# Patient Record
Sex: FEMALE | Race: ASIAN | NOT HISPANIC OR LATINO | Employment: OTHER | URBAN - METROPOLITAN AREA
[De-identification: names, ages, dates, MRNs, and addresses within clinical notes are randomized per-mention and may not be internally consistent; named-entity substitution may affect disease eponyms.]

---

## 2017-02-14 ENCOUNTER — COMMUNICATION - HEALTHEAST (OUTPATIENT)
Dept: FAMILY MEDICINE | Facility: CLINIC | Age: 62
End: 2017-02-14

## 2017-02-14 DIAGNOSIS — E03.9 UNSPECIFIED HYPOTHYROIDISM: ICD-10-CM

## 2017-02-14 DIAGNOSIS — I10 ESSENTIAL HYPERTENSION: ICD-10-CM

## 2017-02-16 RX ORDER — FERROUS SULFATE 325(65) MG
TABLET ORAL
Qty: 30 TABLET | Refills: 3 | Status: SHIPPED | OUTPATIENT
Start: 2017-02-16 | End: 2023-06-16

## 2017-02-16 RX ORDER — MOMETASONE FUROATE AND FORMOTEROL FUMARATE DIHYDRATE 200; 5 UG/1; UG/1
AEROSOL RESPIRATORY (INHALATION)
Qty: 13 INHALER | Refills: 5 | Status: SHIPPED | OUTPATIENT
Start: 2017-02-16 | End: 2023-06-16

## 2017-02-16 RX ORDER — LEVOTHYROXINE SODIUM 75 UG/1
TABLET ORAL
Qty: 90 TABLET | Refills: 2 | Status: SHIPPED | OUTPATIENT
Start: 2017-02-16 | End: 2023-06-16

## 2017-02-16 RX ORDER — LISINOPRIL AND HYDROCHLOROTHIAZIDE 20; 25 MG/1; MG/1
TABLET ORAL
Qty: 90 TABLET | Refills: 2 | Status: SHIPPED | OUTPATIENT
Start: 2017-02-16 | End: 2023-06-16

## 2017-12-31 ENCOUNTER — HEALTH MAINTENANCE LETTER (OUTPATIENT)
Age: 62
End: 2017-12-31

## 2022-09-22 ENCOUNTER — IMMUNIZATION (OUTPATIENT)
Dept: FAMILY MEDICINE | Facility: CLINIC | Age: 67
End: 2022-09-22
Payer: MEDICARE

## 2022-09-22 PROCEDURE — 91313 COVID-19,PF,MODERNA BIVALENT: CPT

## 2022-09-22 PROCEDURE — 90662 IIV NO PRSV INCREASED AG IM: CPT

## 2022-09-22 PROCEDURE — 0134A COVID-19,PF,MODERNA BIVALENT: CPT

## 2022-09-22 PROCEDURE — G0008 ADMIN INFLUENZA VIRUS VAC: HCPCS

## 2023-06-16 ENCOUNTER — OFFICE VISIT (OUTPATIENT)
Dept: FAMILY MEDICINE | Facility: CLINIC | Age: 68
End: 2023-06-16
Payer: MEDICARE

## 2023-06-16 VITALS
WEIGHT: 179 LBS | TEMPERATURE: 97.8 F | HEIGHT: 58 IN | HEART RATE: 66 BPM | RESPIRATION RATE: 16 BRPM | OXYGEN SATURATION: 97 % | BODY MASS INDEX: 37.57 KG/M2 | SYSTOLIC BLOOD PRESSURE: 124 MMHG | DIASTOLIC BLOOD PRESSURE: 88 MMHG

## 2023-06-16 DIAGNOSIS — E78.2 MIXED HYPERLIPIDEMIA: ICD-10-CM

## 2023-06-16 DIAGNOSIS — N18.31 STAGE 3A CHRONIC KIDNEY DISEASE (H): ICD-10-CM

## 2023-06-16 DIAGNOSIS — E03.9 ACQUIRED HYPOTHYROIDISM: ICD-10-CM

## 2023-06-16 DIAGNOSIS — I10 ESSENTIAL HYPERTENSION: ICD-10-CM

## 2023-06-16 DIAGNOSIS — Z23 ENCOUNTER FOR VACCINATION: ICD-10-CM

## 2023-06-16 DIAGNOSIS — J30.2 SEASONAL ALLERGIES: ICD-10-CM

## 2023-06-16 DIAGNOSIS — Z00.00 ENCOUNTER FOR MEDICAL EXAMINATION TO ESTABLISH CARE: Primary | ICD-10-CM

## 2023-06-16 DIAGNOSIS — M25.551 HIP PAIN, RIGHT: ICD-10-CM

## 2023-06-16 LAB — HGB BLD-MCNC: 13.5 G/DL (ref 11.7–15.7)

## 2023-06-16 PROCEDURE — 80061 LIPID PANEL: CPT

## 2023-06-16 PROCEDURE — 85018 HEMOGLOBIN: CPT

## 2023-06-16 PROCEDURE — G0009 ADMIN PNEUMOCOCCAL VACCINE: HCPCS

## 2023-06-16 PROCEDURE — 84443 ASSAY THYROID STIM HORMONE: CPT

## 2023-06-16 PROCEDURE — 84439 ASSAY OF FREE THYROXINE: CPT

## 2023-06-16 PROCEDURE — 36415 COLL VENOUS BLD VENIPUNCTURE: CPT

## 2023-06-16 PROCEDURE — 80048 BASIC METABOLIC PNL TOTAL CA: CPT

## 2023-06-16 PROCEDURE — 90677 PCV20 VACCINE IM: CPT

## 2023-06-16 PROCEDURE — 99204 OFFICE O/P NEW MOD 45 MIN: CPT | Mod: 25

## 2023-06-16 RX ORDER — MOMETASONE FUROATE AND FORMOTEROL FUMARATE DIHYDRATE 200; 5 UG/1; UG/1
2 AEROSOL RESPIRATORY (INHALATION) 2 TIMES DAILY
Qty: 13 G | Refills: 3 | Status: SHIPPED | OUTPATIENT
Start: 2023-06-16 | End: 2023-09-15

## 2023-06-16 RX ORDER — MONTELUKAST SODIUM 10 MG/1
10 TABLET ORAL DAILY
Qty: 90 TABLET | Refills: 3 | Status: SHIPPED | OUTPATIENT
Start: 2023-06-16 | End: 2024-06-04

## 2023-06-16 RX ORDER — LISINOPRIL AND HYDROCHLOROTHIAZIDE 20; 25 MG/1; MG/1
1 TABLET ORAL DAILY
Qty: 90 TABLET | Refills: 3 | Status: SHIPPED | OUTPATIENT
Start: 2023-06-16 | End: 2023-09-25

## 2023-06-16 RX ORDER — SENNOSIDES 8.6 MG
650 CAPSULE ORAL EVERY 8 HOURS PRN
Qty: 180 TABLET | Refills: 3 | Status: SHIPPED | OUTPATIENT
Start: 2023-06-16

## 2023-06-16 RX ORDER — LEVOTHYROXINE SODIUM 75 UG/1
75 TABLET ORAL DAILY
Qty: 90 TABLET | Refills: 3 | Status: SHIPPED | OUTPATIENT
Start: 2023-06-16 | End: 2023-06-22 | Stop reason: DRUGHIGH

## 2023-06-16 NOTE — PROGRESS NOTES
Preceptor Attestation:   Patient seen, evaluated and discussed with the resident Dr. Riddle. I have verified the content of the note, which accurately reflects my assessment of the patient and the plan of care.  Supervising Physician:Benjamin Rosenstein, MD, MA  [facility]

## 2023-06-17 LAB
ANION GAP SERPL CALCULATED.3IONS-SCNC: 16 MMOL/L (ref 7–15)
BUN SERPL-MCNC: 25 MG/DL (ref 8–23)
CALCIUM SERPL-MCNC: 9.8 MG/DL (ref 8.8–10.2)
CHLORIDE SERPL-SCNC: 104 MMOL/L (ref 98–107)
CHOLEST SERPL-MCNC: 197 MG/DL
CREAT SERPL-MCNC: 1.07 MG/DL (ref 0.51–0.95)
DEPRECATED HCO3 PLAS-SCNC: 21 MMOL/L (ref 22–29)
GFR SERPL CREATININE-BSD FRML MDRD: 57 ML/MIN/1.73M2
GLUCOSE SERPL-MCNC: 87 MG/DL (ref 70–99)
HDLC SERPL-MCNC: 59 MG/DL
LDLC SERPL CALC-MCNC: 93 MG/DL
NONHDLC SERPL-MCNC: 138 MG/DL
POTASSIUM SERPL-SCNC: 4.1 MMOL/L (ref 3.4–5.3)
SODIUM SERPL-SCNC: 141 MMOL/L (ref 136–145)
T4 FREE SERPL-MCNC: 1.25 NG/DL (ref 0.9–1.7)
TRIGL SERPL-MCNC: 225 MG/DL
TSH SERPL DL<=0.005 MIU/L-ACNC: 19.9 UIU/ML (ref 0.3–4.2)

## 2023-06-22 DIAGNOSIS — E03.9 ACQUIRED HYPOTHYROIDISM: Primary | ICD-10-CM

## 2023-06-22 RX ORDER — LEVOTHYROXINE SODIUM 100 UG/1
100 TABLET ORAL DAILY
Qty: 90 TABLET | Refills: 0 | Status: SHIPPED | OUTPATIENT
Start: 2023-06-22 | End: 2023-09-25 | Stop reason: DRUGHIGH

## 2023-06-26 NOTE — PROGRESS NOTES
Assessment & Plan     Encounter for medical examination to establish care  Patient presents today to establish care at [facility]. Her only concern today is having her medications filled.     Stage 3a chronic kidney disease (H)  Last check was over a year ago, GFR of 51. Will repeat today.   - BASIC METABOLIC PANEL  - Hemoglobin    Acquired hypothyroidism  Currently on levothyroxine 75 mcg. Notes that she usually takes it daily, but ran out two weeks ago. Denies any hypothyroid symptoms. TSH returned elevated at 19, will increase levothyroxine dose to 100 mcg daily.   - TSH WITH FREE T4 REFLEX  - T4 free  - Increase levothyroxine to 100 mcg daily  - Follow-up in two months     Essential hypertension  Blood pressure at goal today, will refill.   - lisinopril-hydrochlorothiazide (ZESTORETIC) 20-25 MG tablet; Take 1 tablet by mouth daily    Seasonal allergies  Patient manages her seasonal allergies with the following, only uses her inhaler when she has bad allergy symptoms. Has been taking these medications for years.   - montelukast (SINGULAIR) 10 MG tablet; Take 1 tablet (10 mg) by mouth daily  - mometasone-formoterol (DULERA) 200-5 MCG/ACT inhaler; Inhale 2 puffs into the lungs 2 times daily    Hip pain, right  Patient has intermittent hip pain, manages well with tylenol. Discussed returning for another visit if she would like to discuss further work-up or treatment options for her hip pain.   - acetaminophen (TYLENOL) 650 MG CR tablet; Take 1 tablet (650 mg) by mouth every 8 hours as needed for mild pain or fever    Mixed hyperlipidemia  - Lipid panel reflex to direct LDL Non-fasting    Encounter for vaccination  Agrees to PCV20 today.   - Pneumococcal 20 Valent Conjugate (PCV20)    Mary Riddle MD  [facility]    Destiny Mancuso is a 68 year old, presenting for the following health issues:  Establish Care (For meds check and refills ) and Medication Reconciliation (Needs attention,  "not taking all meds on list)    HPI     Establish care  - Transferring from [facility], choosing to come to [facility] because her daughter works here  - Concerns today: refilling medications  - Previous medical history: hypothyroidism, hip pain, hypertension, hyperlipidemia, seasonal allergies, CKD 3   - Hospital visits in the past year: No  - Surgical history: gallbladder removed, appendix removed  - Medications reconciled today: Yes     Review of Systems   Constitutional, HEENT, cardiovascular, pulmonary, gi and gu systems are negative, except as otherwise noted.      Objective    /88   Pulse 66   Temp 97.8  F (36.6  C) (Oral)   Resp 16   Ht 1.47 m (4' 9.87\")   Wt 81.2 kg (179 lb)   SpO2 97%   BMI 37.57 kg/m    Body mass index is 37.57 kg/m .  Physical Exam   GENERAL: healthy, alert and no distress  EYES: Eyes grossly normal to inspection, PERRL and conjunctivae and sclerae normal  RESP: lungs clear to auscultation - no rales, rhonchi or wheezes  CV: regular rate and rhythm, normal S1 S2, no S3 or S4, no murmur, click or rub, no peripheral edema and peripheral pulses strong  MS: no gross musculoskeletal defects noted, no edema  PSYCH: mentation appears normal, affect normal/bright        "

## 2023-09-15 ENCOUNTER — APPOINTMENT (OUTPATIENT)
Dept: CT IMAGING | Facility: HOSPITAL | Age: 68
End: 2023-09-15
Attending: FAMILY MEDICINE
Payer: MEDICARE

## 2023-09-15 ENCOUNTER — HOSPITAL ENCOUNTER (OUTPATIENT)
Facility: HOSPITAL | Age: 68
Setting detail: OBSERVATION
Discharge: HOME | End: 2023-09-16
Attending: EMERGENCY MEDICINE | Admitting: INTERNAL MEDICINE
Payer: MEDICARE

## 2023-09-15 ENCOUNTER — APPOINTMENT (OUTPATIENT)
Dept: CARDIOLOGY | Facility: HOSPITAL | Age: 68
End: 2023-09-15
Attending: NURSE PRACTITIONER
Payer: MEDICARE

## 2023-09-15 DIAGNOSIS — I10 ESSENTIAL HYPERTENSION: ICD-10-CM

## 2023-09-15 DIAGNOSIS — I25.10 CORONARY ARTERY DISEASE INVOLVING NATIVE HEART, UNSPECIFIED VESSEL OR LESION TYPE, UNSPECIFIED WHETHER ANGINA PRESENT: ICD-10-CM

## 2023-09-15 DIAGNOSIS — I21.4 NSTEMI (NON-ST ELEVATED MYOCARDIAL INFARCTION) (H): Primary | ICD-10-CM

## 2023-09-15 DIAGNOSIS — R79.89 ELEVATED TROPONIN: ICD-10-CM

## 2023-09-15 DIAGNOSIS — R06.00 DYSPNEA, UNSPECIFIED TYPE: ICD-10-CM

## 2023-09-15 DIAGNOSIS — R07.9 CHEST PAIN, UNSPECIFIED TYPE: ICD-10-CM

## 2023-09-15 PROBLEM — J45.41 MODERATE PERSISTENT ASTHMA WITH EXACERBATION: Status: ACTIVE | Noted: 2021-12-09

## 2023-09-15 LAB
ABO/RH(D): NORMAL
ABO/RH(D): NORMAL
ANION GAP SERPL CALCULATED.3IONS-SCNC: 13 MMOL/L (ref 7–15)
ANTIBODY SCREEN: NEGATIVE
BASOPHILS # BLD AUTO: 0 10E3/UL (ref 0–0.2)
BASOPHILS NFR BLD AUTO: 0 %
BUN SERPL-MCNC: 44.7 MG/DL (ref 8–23)
CALCIUM SERPL-MCNC: 9.6 MG/DL (ref 8.8–10.2)
CHLORIDE SERPL-SCNC: 105 MMOL/L (ref 98–107)
CREAT SERPL-MCNC: 1.37 MG/DL (ref 0.51–0.95)
D DIMER PPP FEU-MCNC: 1.3 UG/ML FEU (ref 0–0.5)
DEPRECATED HCO3 PLAS-SCNC: 22 MMOL/L (ref 22–29)
EGFRCR SERPLBLD CKD-EPI 2021: 42 ML/MIN/1.73M2
EOSINOPHIL # BLD AUTO: 0 10E3/UL (ref 0–0.7)
EOSINOPHIL NFR BLD AUTO: 0 %
ERYTHROCYTE [DISTWIDTH] IN BLOOD BY AUTOMATED COUNT: 12.8 % (ref 10–15)
ERYTHROCYTE [DISTWIDTH] IN BLOOD BY AUTOMATED COUNT: 12.9 % (ref 10–15)
GLUCOSE SERPL-MCNC: 146 MG/DL (ref 70–99)
HBA1C MFR BLD: 6 %
HCT VFR BLD AUTO: 35.5 % (ref 35–47)
HCT VFR BLD AUTO: 40.9 % (ref 35–47)
HGB BLD-MCNC: 11.1 G/DL (ref 11.7–15.7)
HGB BLD-MCNC: 13.2 G/DL (ref 11.7–15.7)
IMM GRANULOCYTES # BLD: 0.1 10E3/UL
IMM GRANULOCYTES NFR BLD: 1 %
LVEF ECHO: NORMAL
LYMPHOCYTES # BLD AUTO: 1.5 10E3/UL (ref 0.8–5.3)
LYMPHOCYTES NFR BLD AUTO: 15 %
MAGNESIUM SERPL-MCNC: 2 MG/DL (ref 1.7–2.3)
MCH RBC QN AUTO: 28.7 PG (ref 26.5–33)
MCH RBC QN AUTO: 28.9 PG (ref 26.5–33)
MCHC RBC AUTO-ENTMCNC: 31.3 G/DL (ref 31.5–36.5)
MCHC RBC AUTO-ENTMCNC: 32.3 G/DL (ref 31.5–36.5)
MCV RBC AUTO: 90 FL (ref 78–100)
MCV RBC AUTO: 92 FL (ref 78–100)
MONOCYTES # BLD AUTO: 0.4 10E3/UL (ref 0–1.3)
MONOCYTES NFR BLD AUTO: 4 %
NEUTROPHILS # BLD AUTO: 8.1 10E3/UL (ref 1.6–8.3)
NEUTROPHILS NFR BLD AUTO: 80 %
NRBC # BLD AUTO: 0 10E3/UL
NRBC BLD AUTO-RTO: 0 /100
NT-PROBNP SERPL-MCNC: 426 PG/ML (ref 0–900)
PLATELET # BLD AUTO: 185 10E3/UL (ref 150–450)
PLATELET # BLD AUTO: 208 10E3/UL (ref 150–450)
POTASSIUM SERPL-SCNC: 4.4 MMOL/L (ref 3.4–5.3)
RBC # BLD AUTO: 3.87 10E6/UL (ref 3.8–5.2)
RBC # BLD AUTO: 4.56 10E6/UL (ref 3.8–5.2)
SODIUM SERPL-SCNC: 140 MMOL/L (ref 136–145)
SPECIMEN EXPIRATION DATE: NORMAL
SPECIMEN EXPIRATION DATE: NORMAL
T4 FREE SERPL-MCNC: 1.55 NG/DL (ref 0.9–1.7)
TROPONIN T SERPL HS-MCNC: 22 NG/L
TROPONIN T SERPL HS-MCNC: 47 NG/L
TROPONIN T SERPL HS-MCNC: 85 NG/L
TSH SERPL DL<=0.005 MIU/L-ACNC: 0.22 UIU/ML (ref 0.3–4.2)
WBC # BLD AUTO: 10.2 10E3/UL (ref 4–11)
WBC # BLD AUTO: 9.3 10E3/UL (ref 4–11)

## 2023-09-15 PROCEDURE — 86901 BLOOD TYPING SEROLOGIC RH(D): CPT | Performed by: INTERNAL MEDICINE

## 2023-09-15 PROCEDURE — G0378 HOSPITAL OBSERVATION PER HR: HCPCS

## 2023-09-15 PROCEDURE — 93458 L HRT ARTERY/VENTRICLE ANGIO: CPT | Mod: 26 | Performed by: INTERNAL MEDICINE

## 2023-09-15 PROCEDURE — C1887 CATHETER, GUIDING: HCPCS | Performed by: INTERNAL MEDICINE

## 2023-09-15 PROCEDURE — 250N000011 HC RX IP 250 OP 636: Mod: JZ | Performed by: EMERGENCY MEDICINE

## 2023-09-15 PROCEDURE — 255N000002 HC RX 255 OP 636: Performed by: INTERNAL MEDICINE

## 2023-09-15 PROCEDURE — 258N000003 HC RX IP 258 OP 636: Performed by: INTERNAL MEDICINE

## 2023-09-15 PROCEDURE — 85025 COMPLETE CBC W/AUTO DIFF WBC: CPT | Performed by: FAMILY MEDICINE

## 2023-09-15 PROCEDURE — 71275 CT ANGIOGRAPHY CHEST: CPT | Mod: MA

## 2023-09-15 PROCEDURE — 86850 RBC ANTIBODY SCREEN: CPT | Performed by: INTERNAL MEDICINE

## 2023-09-15 PROCEDURE — 36415 COLL VENOUS BLD VENIPUNCTURE: CPT | Performed by: EMERGENCY MEDICINE

## 2023-09-15 PROCEDURE — 99152 MOD SED SAME PHYS/QHP 5/>YRS: CPT | Performed by: INTERNAL MEDICINE

## 2023-09-15 PROCEDURE — 258N000003 HC RX IP 258 OP 636: Performed by: EMERGENCY MEDICINE

## 2023-09-15 PROCEDURE — 250N000013 HC RX MED GY IP 250 OP 250 PS 637: Performed by: EMERGENCY MEDICINE

## 2023-09-15 PROCEDURE — 93458 L HRT ARTERY/VENTRICLE ANGIO: CPT | Performed by: INTERNAL MEDICINE

## 2023-09-15 PROCEDURE — 99285 EMERGENCY DEPT VISIT HI MDM: CPT | Mod: 25

## 2023-09-15 PROCEDURE — 255N000002 HC RX 255 OP 636: Performed by: FAMILY MEDICINE

## 2023-09-15 PROCEDURE — 84443 ASSAY THYROID STIM HORMONE: CPT | Performed by: FAMILY MEDICINE

## 2023-09-15 PROCEDURE — 85379 FIBRIN DEGRADATION QUANT: CPT | Performed by: FAMILY MEDICINE

## 2023-09-15 PROCEDURE — 99222 1ST HOSP IP/OBS MODERATE 55: CPT | Mod: GC

## 2023-09-15 PROCEDURE — 93005 ELECTROCARDIOGRAM TRACING: CPT | Mod: XU,76 | Performed by: EMERGENCY MEDICINE

## 2023-09-15 PROCEDURE — 93005 ELECTROCARDIOGRAM TRACING: CPT | Performed by: STUDENT IN AN ORGANIZED HEALTH CARE EDUCATION/TRAINING PROGRAM

## 2023-09-15 PROCEDURE — 96361 HYDRATE IV INFUSION ADD-ON: CPT | Mod: 59

## 2023-09-15 PROCEDURE — 36415 COLL VENOUS BLD VENIPUNCTURE: CPT | Performed by: FAMILY MEDICINE

## 2023-09-15 PROCEDURE — 250N000011 HC RX IP 250 OP 636: Performed by: INTERNAL MEDICINE

## 2023-09-15 PROCEDURE — 258N000003 HC RX IP 258 OP 636

## 2023-09-15 PROCEDURE — 96360 HYDRATION IV INFUSION INIT: CPT | Mod: 59

## 2023-09-15 PROCEDURE — 84484 ASSAY OF TROPONIN QUANT: CPT | Mod: 91 | Performed by: INTERNAL MEDICINE

## 2023-09-15 PROCEDURE — 80048 BASIC METABOLIC PNL TOTAL CA: CPT | Performed by: FAMILY MEDICINE

## 2023-09-15 PROCEDURE — 84484 ASSAY OF TROPONIN QUANT: CPT | Mod: 91 | Performed by: EMERGENCY MEDICINE

## 2023-09-15 PROCEDURE — 83735 ASSAY OF MAGNESIUM: CPT | Performed by: FAMILY MEDICINE

## 2023-09-15 PROCEDURE — 85041 AUTOMATED RBC COUNT: CPT | Performed by: INTERNAL MEDICINE

## 2023-09-15 PROCEDURE — C1894 INTRO/SHEATH, NON-LASER: HCPCS | Performed by: INTERNAL MEDICINE

## 2023-09-15 PROCEDURE — 93306 TTE W/DOPPLER COMPLETE: CPT | Mod: 26 | Performed by: INTERNAL MEDICINE

## 2023-09-15 PROCEDURE — 250N000009 HC RX 250: Performed by: INTERNAL MEDICINE

## 2023-09-15 PROCEDURE — 83036 HEMOGLOBIN GLYCOSYLATED A1C: CPT

## 2023-09-15 PROCEDURE — 250N000013 HC RX MED GY IP 250 OP 250 PS 637: Performed by: INTERNAL MEDICINE

## 2023-09-15 PROCEDURE — 83880 ASSAY OF NATRIURETIC PEPTIDE: CPT | Performed by: FAMILY MEDICINE

## 2023-09-15 PROCEDURE — 36415 COLL VENOUS BLD VENIPUNCTURE: CPT | Performed by: INTERNAL MEDICINE

## 2023-09-15 PROCEDURE — 84439 ASSAY OF FREE THYROXINE: CPT | Performed by: FAMILY MEDICINE

## 2023-09-15 PROCEDURE — 93005 ELECTROCARDIOGRAM TRACING: CPT | Performed by: EMERGENCY MEDICINE

## 2023-09-15 PROCEDURE — 84484 ASSAY OF TROPONIN QUANT: CPT | Performed by: FAMILY MEDICINE

## 2023-09-15 PROCEDURE — 272N000001 HC OR GENERAL SUPPLY STERILE: Performed by: INTERNAL MEDICINE

## 2023-09-15 PROCEDURE — 250N000011 HC RX IP 250 OP 636: Mod: JZ | Performed by: NURSE PRACTITIONER

## 2023-09-15 PROCEDURE — 99205 OFFICE O/P NEW HI 60 MIN: CPT | Mod: 25 | Performed by: INTERNAL MEDICINE

## 2023-09-15 RX ORDER — MONTELUKAST SODIUM 10 MG/1
10 TABLET ORAL DAILY
Status: DISCONTINUED | OUTPATIENT
Start: 2023-09-16 | End: 2023-09-16 | Stop reason: HOSPADM

## 2023-09-15 RX ORDER — ACETAMINOPHEN 325 MG/1
650 TABLET ORAL EVERY 4 HOURS PRN
Status: DISCONTINUED | OUTPATIENT
Start: 2023-09-15 | End: 2023-09-16 | Stop reason: HOSPADM

## 2023-09-15 RX ORDER — ONDANSETRON 4 MG/1
4 TABLET, ORALLY DISINTEGRATING ORAL EVERY 6 HOURS PRN
Status: DISCONTINUED | OUTPATIENT
Start: 2023-09-15 | End: 2023-09-16 | Stop reason: HOSPADM

## 2023-09-15 RX ORDER — ACETAMINOPHEN 650 MG/1
650 SUPPOSITORY RECTAL EVERY 6 HOURS PRN
Status: DISCONTINUED | OUTPATIENT
Start: 2023-09-15 | End: 2023-09-16 | Stop reason: HOSPADM

## 2023-09-15 RX ORDER — NALOXONE HYDROCHLORIDE 0.4 MG/ML
0.2 INJECTION, SOLUTION INTRAMUSCULAR; INTRAVENOUS; SUBCUTANEOUS
Status: ACTIVE | OUTPATIENT
Start: 2023-09-15 | End: 2023-09-15

## 2023-09-15 RX ORDER — LEVOTHYROXINE SODIUM 100 UG/1
100 TABLET ORAL DAILY
Status: DISCONTINUED | OUTPATIENT
Start: 2023-09-16 | End: 2023-09-16 | Stop reason: HOSPADM

## 2023-09-15 RX ORDER — FENTANYL CITRATE 50 UG/ML
INJECTION, SOLUTION INTRAMUSCULAR; INTRAVENOUS
Status: DISCONTINUED | OUTPATIENT
Start: 2023-09-15 | End: 2023-09-15 | Stop reason: HOSPADM

## 2023-09-15 RX ORDER — PROCHLORPERAZINE MALEATE 5 MG
5 TABLET ORAL EVERY 6 HOURS PRN
Status: DISCONTINUED | OUTPATIENT
Start: 2023-09-15 | End: 2023-09-16 | Stop reason: HOSPADM

## 2023-09-15 RX ORDER — SODIUM CHLORIDE 9 MG/ML
75 INJECTION, SOLUTION INTRAVENOUS CONTINUOUS
Status: ACTIVE | OUTPATIENT
Start: 2023-09-15 | End: 2023-09-16

## 2023-09-15 RX ORDER — NALOXONE HYDROCHLORIDE 0.4 MG/ML
0.4 INJECTION, SOLUTION INTRAMUSCULAR; INTRAVENOUS; SUBCUTANEOUS
Status: ACTIVE | OUTPATIENT
Start: 2023-09-15 | End: 2023-09-15

## 2023-09-15 RX ORDER — ONDANSETRON 2 MG/ML
4 INJECTION INTRAMUSCULAR; INTRAVENOUS EVERY 6 HOURS PRN
Status: DISCONTINUED | OUTPATIENT
Start: 2023-09-15 | End: 2023-09-16 | Stop reason: HOSPADM

## 2023-09-15 RX ORDER — FENTANYL CITRATE 50 UG/ML
25 INJECTION, SOLUTION INTRAMUSCULAR; INTRAVENOUS
Status: DISCONTINUED | OUTPATIENT
Start: 2023-09-15 | End: 2023-09-16 | Stop reason: HOSPADM

## 2023-09-15 RX ORDER — LIDOCAINE 40 MG/G
CREAM TOPICAL
Status: DISCONTINUED | OUTPATIENT
Start: 2023-09-15 | End: 2023-09-15 | Stop reason: HOSPADM

## 2023-09-15 RX ORDER — ASPIRIN 325 MG
325 TABLET ORAL ONCE
Status: COMPLETED | OUTPATIENT
Start: 2023-09-15 | End: 2023-09-15

## 2023-09-15 RX ORDER — IODIXANOL 320 MG/ML
INJECTION, SOLUTION INTRAVASCULAR
Status: DISCONTINUED | OUTPATIENT
Start: 2023-09-15 | End: 2023-09-15 | Stop reason: HOSPADM

## 2023-09-15 RX ORDER — LIDOCAINE 40 MG/G
CREAM TOPICAL
Status: DISCONTINUED | OUTPATIENT
Start: 2023-09-15 | End: 2023-09-16 | Stop reason: HOSPADM

## 2023-09-15 RX ORDER — ASPIRIN 81 MG/1
324 TABLET, CHEWABLE ORAL ONCE
Status: COMPLETED | OUTPATIENT
Start: 2023-09-15 | End: 2023-09-15

## 2023-09-15 RX ORDER — LISINOPRIL AND HYDROCHLOROTHIAZIDE 20; 25 MG/1; MG/1
1 TABLET ORAL DAILY
Status: DISCONTINUED | OUTPATIENT
Start: 2023-09-15 | End: 2023-09-16 | Stop reason: HOSPADM

## 2023-09-15 RX ORDER — ATORVASTATIN CALCIUM 40 MG/1
40 TABLET, FILM COATED ORAL EVERY EVENING
Status: DISCONTINUED | OUTPATIENT
Start: 2023-09-15 | End: 2023-09-16 | Stop reason: HOSPADM

## 2023-09-15 RX ORDER — ACETAMINOPHEN 325 MG/1
650 TABLET ORAL EVERY 6 HOURS PRN
Status: DISCONTINUED | OUTPATIENT
Start: 2023-09-15 | End: 2023-09-16 | Stop reason: HOSPADM

## 2023-09-15 RX ORDER — SODIUM CHLORIDE 9 MG/ML
INJECTION, SOLUTION INTRAVENOUS CONTINUOUS
Status: DISCONTINUED | OUTPATIENT
Start: 2023-09-15 | End: 2023-09-15

## 2023-09-15 RX ORDER — ATROPINE SULFATE 0.1 MG/ML
0.5 INJECTION INTRAVENOUS
Status: ACTIVE | OUTPATIENT
Start: 2023-09-15 | End: 2023-09-15

## 2023-09-15 RX ORDER — HYDRALAZINE HYDROCHLORIDE 20 MG/ML
10 INJECTION INTRAMUSCULAR; INTRAVENOUS
Status: COMPLETED | OUTPATIENT
Start: 2023-09-15 | End: 2023-09-15

## 2023-09-15 RX ORDER — AMLODIPINE BESYLATE 5 MG/1
5 TABLET ORAL DAILY
Status: DISCONTINUED | OUTPATIENT
Start: 2023-09-15 | End: 2023-09-16 | Stop reason: HOSPADM

## 2023-09-15 RX ORDER — OXYCODONE HYDROCHLORIDE 5 MG/1
5 TABLET ORAL EVERY 4 HOURS PRN
Status: DISCONTINUED | OUTPATIENT
Start: 2023-09-15 | End: 2023-09-16 | Stop reason: HOSPADM

## 2023-09-15 RX ORDER — IOPAMIDOL 755 MG/ML
75 INJECTION, SOLUTION INTRAVASCULAR ONCE
Status: COMPLETED | OUTPATIENT
Start: 2023-09-15 | End: 2023-09-15

## 2023-09-15 RX ORDER — FENTANYL CITRATE 50 UG/ML
25 INJECTION, SOLUTION INTRAMUSCULAR; INTRAVENOUS
Status: DISCONTINUED | OUTPATIENT
Start: 2023-09-15 | End: 2023-09-15 | Stop reason: HOSPADM

## 2023-09-15 RX ORDER — FLUMAZENIL 0.1 MG/ML
0.2 INJECTION, SOLUTION INTRAVENOUS
Status: ACTIVE | OUTPATIENT
Start: 2023-09-15 | End: 2023-09-15

## 2023-09-15 RX ORDER — SODIUM CHLORIDE 9 MG/ML
INJECTION, SOLUTION INTRAVENOUS CONTINUOUS
Status: DISCONTINUED | OUTPATIENT
Start: 2023-09-15 | End: 2023-09-15 | Stop reason: HOSPADM

## 2023-09-15 RX ORDER — ASPIRIN 81 MG/1
243 TABLET, CHEWABLE ORAL ONCE
Status: COMPLETED | OUTPATIENT
Start: 2023-09-15 | End: 2023-09-15

## 2023-09-15 RX ORDER — PROCHLORPERAZINE 25 MG
12.5 SUPPOSITORY, RECTAL RECTAL EVERY 12 HOURS PRN
Status: DISCONTINUED | OUTPATIENT
Start: 2023-09-15 | End: 2023-09-16 | Stop reason: HOSPADM

## 2023-09-15 RX ORDER — HEPARIN SODIUM 10000 [USP'U]/100ML
0-5000 INJECTION, SOLUTION INTRAVENOUS CONTINUOUS
Status: DISCONTINUED | OUTPATIENT
Start: 2023-09-15 | End: 2023-09-15

## 2023-09-15 RX ORDER — LISINOPRIL AND HYDROCHLOROTHIAZIDE 20; 25 MG/1; MG/1
1 TABLET ORAL ONCE
Status: COMPLETED | OUTPATIENT
Start: 2023-09-15 | End: 2023-09-15

## 2023-09-15 RX ORDER — NITROGLYCERIN 0.4 MG/1
0.4 TABLET SUBLINGUAL EVERY 5 MIN PRN
Status: DISCONTINUED | OUTPATIENT
Start: 2023-09-15 | End: 2023-09-16 | Stop reason: HOSPADM

## 2023-09-15 RX ORDER — DIAZEPAM 5 MG
5 TABLET ORAL
Status: COMPLETED | OUTPATIENT
Start: 2023-09-15 | End: 2023-09-15

## 2023-09-15 RX ORDER — OXYCODONE HYDROCHLORIDE 5 MG/1
10 TABLET ORAL EVERY 4 HOURS PRN
Status: DISCONTINUED | OUTPATIENT
Start: 2023-09-15 | End: 2023-09-16 | Stop reason: HOSPADM

## 2023-09-15 RX ORDER — SODIUM CHLORIDE 9 MG/ML
75 INJECTION, SOLUTION INTRAVENOUS CONTINUOUS
Status: ACTIVE | OUTPATIENT
Start: 2023-09-15 | End: 2023-09-15

## 2023-09-15 RX ADMIN — PERFLUTREN 2 ML: 6.52 INJECTION, SUSPENSION INTRAVENOUS at 15:33

## 2023-09-15 RX ADMIN — AMLODIPINE BESYLATE 5 MG: 5 TABLET ORAL at 17:03

## 2023-09-15 RX ADMIN — SODIUM CHLORIDE: 9 INJECTION, SOLUTION INTRAVENOUS at 10:49

## 2023-09-15 RX ADMIN — SODIUM CHLORIDE 75 ML/HR: 9 INJECTION, SOLUTION INTRAVENOUS at 23:27

## 2023-09-15 RX ADMIN — SODIUM CHLORIDE 1000 ML: 9 INJECTION, SOLUTION INTRAVENOUS at 06:58

## 2023-09-15 RX ADMIN — ASPIRIN 81 MG CHEWABLE TABLET 324 MG: 81 TABLET CHEWABLE at 09:18

## 2023-09-15 RX ADMIN — HYDRALAZINE HYDROCHLORIDE 10 MG: 20 INJECTION INTRAMUSCULAR; INTRAVENOUS at 17:03

## 2023-09-15 RX ADMIN — HEPARIN SODIUM AND DEXTROSE 1000 UNITS/HR: 10000; 5 INJECTION INTRAVENOUS at 12:05

## 2023-09-15 RX ADMIN — ATORVASTATIN CALCIUM 40 MG: 40 TABLET, FILM COATED ORAL at 20:06

## 2023-09-15 RX ADMIN — IOPAMIDOL 75 ML: 755 INJECTION, SOLUTION INTRAVENOUS at 06:47

## 2023-09-15 RX ADMIN — NITROGLYCERIN 0.4 MG: 0.4 TABLET, ORALLY DISINTEGRATING SUBLINGUAL at 10:44

## 2023-09-15 RX ADMIN — LISINOPRIL AND HYDROCHLOROTHIAZIDE 1 TABLET: 25; 20 TABLET ORAL at 09:48

## 2023-09-15 RX ADMIN — DIAZEPAM 5 MG: 5 TABLET ORAL at 13:43

## 2023-09-15 ASSESSMENT — ACTIVITIES OF DAILY LIVING (ADL)
DEPENDENT_IADLS:: INDEPENDENT
ADLS_ACUITY_SCORE: 40
ADLS_ACUITY_SCORE: 35
ADLS_ACUITY_SCORE: 40
ADLS_ACUITY_SCORE: 35
ADLS_ACUITY_SCORE: 40

## 2023-09-15 ASSESSMENT — EJECTION FRACTION: EF_VALUE: .26

## 2023-09-15 ASSESSMENT — ENCOUNTER SYMPTOMS
SHORTNESS OF BREATH: 1
COUGH: 0
FEVER: 0

## 2023-09-15 NOTE — PLAN OF CARE
Pt alert and oriented. Vss on room air. Pt right radial site cdi. Cms +, no signs of bleeding / hematoma. Tr band in place. Pt ready to  be admitted to p3. Report given to JUHI Salas on P3. All belongings remain with patient and family.

## 2023-09-15 NOTE — ED NOTES
Pt called nurse in to get more clarification about angiogram. She is afraid it might leave scar on insertion site.

## 2023-09-15 NOTE — PRE-PROCEDURE
GENERAL PRE-PROCEDURE:   Procedure:  Coronary angiogram with possible PCI  Date/Time:  9/15/2023 1:29 PM    Written consent obtained?: Yes    Risks and benefits: Risks, benefits and alternatives were discussed    Consent given by:  Patient  Patient states understanding of procedure being performed: Yes    Patient's understanding of procedure matches consent: Yes    Procedure consent matches procedure scheduled: Yes    Expected level of sedation:  Moderate  Appropriately NPO:  Yes  ASA Class:  4 (chest pain, HTN, hypercholesterolemia, CKD Stage IIIa, Class II obesity; BMI 38.95kg/m2)  Mallampati  :  Grade 2- soft palate, base of uvula, tonsillar pillars, and portion of posterior pharyngeal wall visible  Lungs:  Lungs clear with good breath sounds bilaterally  Heart:  Normal heart sounds and rate  History & Physical reviewed:  History and physical reviewed and updates made (see comment)  H&P Comments:  The patient is a DNR/DNI and understands this will be placed on hold for today's procedure    Clinically Significant Risk Factors Present on Admission    Cardiovascular : Not present on admission    Fluid & Electrolyte Disorders : Not present on admission    Gastroenterology : Not present on admission    Hematology/Oncology : Not present on admission    Nephrology: CKD POA List: Stage 3a (GFR 45-59)    Neurology : Not present on admission    Pulmonology : Not present on admission    Systemic : Not present on admission    Statement of review:  I have reviewed the lab findings, diagnostic data, medications, and the plan for sedation

## 2023-09-15 NOTE — DISCHARGE INSTRUCTIONS

## 2023-09-15 NOTE — H&P
[facility]    History and Physical - Hospitalist Service       Date of Admission:  9/15/2023    Assessment & Plan      Jamee Muhammad is a 68 year old female with PMH significant for CKD, hypothyroidism and HTN presenting with new onset of chest pain and shortness of breath concerning for ACS.     Chest pain, concern for ACS  Hyperlipidemia  Patient presents with sudden onset of left sided chest pain radiating to the arms and shortness of breath on day of admission. No recent viral illness. EKG showed sinus vicenta on admission, but troponins elevated at 22, 47 and 85 consecutively. Cardiology consulted, concern for acute coronary syndrome. Plan for coronary angiogram this afternoon.   - Cardiology consult  - Coronary angiogram this afternoon  - IV heparin infusion  - Started 40 mg atorvastatin daily   - Telemetry     MESFIN on CKD  Patient noted to have creatine of 1.37 with GFR of 42 on admission, baseline appears to be 1.0-1.1. Normal saline bolus given in ED, will monitor effect.   - Daily BMP     Hypertension  Patient's blood pressure elevated to 239/127 on admission, improved to 182/91 after being given PTA anti-hypertensive medication. Pain could be contributing to this elevation.   - Hold PTA lisinopril-hydrochlorothiazide with current MESFIN, reassess in the morning after receiving fluids.     Hyperglycemia  Patient noted to be hyperglycemic to 146 on admission, no recent A1c on file.   - A1c     Hypothyroidism  Patient was restarted on previous hypothyroidism treatment with her TSH returned elevated to 19 in June. On admission, her TSH was 0.22, possibly pointing to over treatment with current dose. Will continue PTA dose while hospitalized, and recommend outpatient follow-up and titration of levothyroxine.   - Continue PTA levothyroxine 100 mg    Seasonal allergies  - Continue PTA montelukast.      Diet: NPO per Anesthesia Guidelines for Procedure/Surgery Except for: Meds  DVT Prophylaxis:  "Heparin  Crowder Catheter: Not present  Fluids: 150 ml/hr  Lines: None     Cardiac Monitoring: ACTIVE order. Indication: Chest pain/ ACS rule out (24 hours)  Code Status: No CPR- Do NOT Intubate    Clinically Significant Risk Factors Present on Admission                    # Hypertension: Noted on problem list      # Obesity: Estimated body mass index is 38.95 kg/m  as calculated from the following:    Height as of this encounter: 1.448 m (4' 9\").    Weight as of this encounter: 81.6 kg (180 lb).       # Asthma: noted on problem list      The patient's care was discussed with the Attending Physician, Dr. Le .    Mary Riddle MD  Hospitalist Service  [facility]  ______________________________________________________________________    Chief Complaint     Chest pain and shortness of breath     History of Present Illness   Jamee Muhammad is a 68 year old female with PMH significant for CKD, hypothyroidism and HTN presenting with new onset of chest pain and shortness of breath.     Patient woke up at her normal waking time at 3:00 AM this morning with 10/10 left sided chest pain and shortness of breath. She notes that the pain radiated to her arms. Both her pain and shortness of breath were worse with lying down flat. No chest pain or shortness of breath in the days prior to this episode, no lower extremity swelling. Notes that she she felt normal yesterday; denies fever, fatigue, cough, abdominal pain, nausea/vomiting, diarrhea, black or bloody stools, dysuria, increased urinary urgency/frequency, or rash. Jamee has no previous cardiac history.     Past Medical History    No past medical history on file.    Past Surgical History   Past Surgical History:   Procedure Laterality Date     SECTION  32yrs ago    EYELID LACERATION REPAIR      LAPAROSCOPIC APPENDECTOMY      LAPAROSCOPIC CHOLECYSTECTOMY  2014       Prior to Admission Medications   Prior to Admission Medications "   Prescriptions Last Dose Informant Patient Reported? Taking?   acetaminophen (TYLENOL) 650 MG CR tablet Unknown at prn  No Yes   Sig: Take 1 tablet (650 mg) by mouth every 8 hours as needed for mild pain or fever   cholecalciferol, vitamin D3, 2,000 unit cap 9/14/2023 at am  No Yes   Sig: [CHOLECALCIFEROL, VITAMIN D3, 2,000 UNIT CAP] Take 2 capsules by mouth daily.   levothyroxine (SYNTHROID/LEVOTHROID) 100 MCG tablet 9/14/2023 at am  No Yes   Sig: Take 1 tablet (100 mcg) by mouth daily   lisinopril-hydrochlorothiazide (ZESTORETIC) 20-25 MG tablet 9/14/2023 at am  No Yes   Sig: Take 1 tablet by mouth daily   montelukast (SINGULAIR) 10 MG tablet Past Month at pm- ran out  No Yes   Sig: Take 1 tablet (10 mg) by mouth daily      Facility-Administered Medications: None        Social History   I have reviewed this patient's social history and updated it with pertinent information if needed.  Social History     Tobacco Use    Smoking status: Never    Smokeless tobacco: Never   Substance Use Topics    Alcohol use: No    Drug use: No     Allergies   Allergies   Allergen Reactions    Gadavist [Gadobutrol] Other (See Comments) and Headache     Patient had sensitivity to the Gadavist on 6/17/2015.  She claimed she could not breath or swallow well.  After a few minutes she was fine.  Dr. Valente took her vitals and accessed she was fine and probably did not have a reaction.  Dr. Valente recommends the patient have MRI studies done in a hospital setting from now on.  Dr. Mae recommended on 6/18/2015 that the patient be pre-medicated if she needs MRI contrast in the future. - CAB    Other Allergy (See Comments) [External Allergen Needs Reconciliation - See Comment] Unknown     Other, 05/30/2013.  Action: [place].          Physical Exam   Vital Signs: Temp: 97.3  F (36.3  C) Temp src: Temporal BP: (!) 193/108 Pulse: 55   Resp: 27 SpO2: 99 % O2 Device: Nasal cannula Oxygen Delivery: 4 LPM  Weight: 180 lbs 0 oz    General:   Appears well and in no acute distress  Psych:  Alert and oriented to person, place, and time. Able to articulate logical thoughts.   HEENT:  Eyes grossly normal to inspection. Extraocular movements intact. Pupils equal, round, and reactive to light. Mucous membranes moist. No ulcers, erythremia, or lesions noted in the oropharynx.  Cardiovascular:  Regular rate and rhythm, normal S1 and S2 without murmur.   Respiratory:  Lungs clear to auscultation bilaterally. No wheezing or crackles. No prolonged expiration. Symmetrical chest rise.  Neuro:  Clear coherent speech. CNII-XII intact. Good  strength.  Musculoskeletal:  No gross extremity deformities. No peripheral edema.  GI:  Soft, non-tender abdomen.   Derm:  No suspicious lesions or rashes    Data     I have personally reviewed the following data over the past 24 hrs:    9.3  \   11.1 (L)   / 185     140 105 44.7 (H) /  146 (H)   4.4 22 1.37 (H) \     Trop: 85 (H) BNP: 426     TSH: 0.22 (L) T4: 1.55 A1C: 6.0 (H)     INR:  N/A PTT:  N/A   D-dimer:  1.30 (H) Fibrinogen:  N/A     Imaging results reviewed over the past 24 hrs:   Recent Results (from the past 24 hour(s))   CT Chest Pulmonary Embolism w Contrast    Narrative    EXAM: CT CHEST PULMONARY EMBOLISM W CONTRAST  LOCATION: [facility]  DATE: 9/15/2023    INDICATION: Chest pain, dyspnea, elevated D dimer  COMPARISON: None.  TECHNIQUE: CT chest pulmonary angiogram during arterial phase injection of IV contrast. Multiplanar reformats and MIP reconstructions were performed. Dose reduction techniques were used.   CONTRAST: IsoVue 370 75mL    FINDINGS:  ANGIOGRAM CHEST: Pulmonary arteries are normal caliber and negative for pulmonary emboli. No thoracic aortic aneurysm. No CT evidence of right heart strain.    LUNGS AND PLEURA: Linear atelectasis or scarring in the lingula. Dependent atelectasis in the posterior lung bases. No pleural effusions. Benign pleural thickening in the posterior  lung bases.    MEDIASTINUM/AXILLAE: No lymphadenopathy. Normal esophagus. No significant pericardial effusion.    CORONARY ARTERY CALCIFICATION: None.    UPPER ABDOMEN: The gallbladder is surgically absent. Vascular calcifications abdominal aorta and splenic artery. Otherwise unremarkable    MUSCULOSKELETAL: No concerning osseous abnormalities.      Impression    IMPRESSION:  1.  No pulmonary embolism seen.    2.  Linear atelectasis or scarring in the lingula. Dependent atelectasis in the posterior lung bases. No pleural effusions. Benign pleural thickening in the posterior lung bases.

## 2023-09-15 NOTE — ED NOTES
"[facility] ED Handoff Report    ED Chief Complaint: chest pain    ED Diagnosis:  (I21.4) NSTEMI (non-ST elevated myocardial infarction) (H)  (primary encounter diagnosis)    (R07.9) Chest pain, unspecified type    (R06.00) Dyspnea, unspecified type    (R77.8) Elevated troponin       PMH:  No past medical history on file.     Code Status:  No Order     Falls Risk: Yes Band: Applied    Current Living Situation/Residence: lives with their son or daughter and lives in a house     Elimination Status: Continent: Yes     Activity Level: SBA    Patients Preferred Language:  English     Needed: No (can speak english and hmong)    Vital Signs:  BP (!) 148/95   Pulse 56   Temp 97.3  F (36.3  C) (Temporal)   Resp 27   Ht 1.448 m (4' 9\")   Wt 81.6 kg (180 lb)   SpO2 99%   BMI 38.95 kg/m       Cardiac Rhythm: Sinus vicenta or Sinus rhythm    Pain Score: 1/10    Is the Patient Confused:  No    Last Food or Drink: 9/14/23 at 9pm      Tests Performed: Done: Labs and Imaging      Family Dynamics/Concerns: No    Family Updated On Visitor Policy: No    Plan of Care Communicated to Family: Yes    Who Was Updated about Plan of Care: , son, daughter     Belongings Checklist Done and Signed by Patient: No    Medications sent with patient: none    Covid: asymptomatic ,     Additional Information: pt is a/o x4. Next Heparin Xa is at 174. Going to cath lab for angiogram soon. 2 and 3rd trop gone up from initial. Pt received 1 tab of nitroglycerin and chest pain went from 3 to 1. Cardiologist aware and okay with that.    RN: Aleln Jordan RN   9/15/2023 12:55 PM      "

## 2023-09-15 NOTE — ED TRIAGE NOTES
Pt here d/t CP and SOB that started approx 1.5hrs ago. States pain is worse when laying down. Hard to take deep breaths. Pain radiates to back and to arms. Pulse in 110s-130s. Denies N/V. Sats in high 90s.      Triage Assessment       Row Name 09/15/23 0528       Triage Assessment (Adult)    Airway WDL WDL

## 2023-09-15 NOTE — ED NOTES
EMERGENCY DEPARTMENT SIGNOUT NOTE    Patient signed out to me from Dr. Mg.      Jamee Muhammad is a 68 year old female who presents to the ED for evaluation of an acute onset of left sided chest pain that began when she woke up at her usual time around 3 AM. Notes some accompanying shortness of breath that is also worse with exertion and lying flat.    RADIOLOGY/LABS:  Reviewed all pertinent imaging. Please see official radiology report. All pertinent labs reviewed and interpreted.    Results for orders placed or performed during the hospital encounter of 09/15/23   CT Chest Pulmonary Embolism w Contrast    Impression    IMPRESSION:  1.  No pulmonary embolism seen.    2.  Linear atelectasis or scarring in the lingula. Dependent atelectasis in the posterior lung bases. No pleural effusions. Benign pleural thickening in the posterior lung bases.   Basic metabolic panel   Result Value Ref Range    Sodium 140 136 - 145 mmol/L    Potassium 4.4 3.4 - 5.3 mmol/L    Chloride 105 98 - 107 mmol/L    Carbon Dioxide (CO2) 22 22 - 29 mmol/L    Anion Gap 13 7 - 15 mmol/L    Urea Nitrogen 44.7 (H) 8.0 - 23.0 mg/dL    Creatinine 1.37 (H) 0.51 - 0.95 mg/dL    Calcium 9.6 8.8 - 10.2 mg/dL    Glucose 146 (H) 70 - 99 mg/dL    GFR Estimate 42 (L) >60 mL/min/1.73m2   Result Value Ref Range    Troponin T, High Sensitivity 22 (H) <=14 ng/L   Result Value Ref Range    Magnesium 2.0 1.7 - 2.3 mg/dL   TSH with free T4 reflex   Result Value Ref Range    TSH 0.22 (L) 0.30 - 4.20 uIU/mL   Nt probnp inpatient (BNP)   Result Value Ref Range    N terminal Pro BNP Inpatient 426 0 - 900 pg/mL   D dimer quantitative   Result Value Ref Range    D-Dimer Quantitative 1.30 (H) 0.00 - 0.50 ug/mL FEU   CBC with platelets and differential   Result Value Ref Range    WBC Count 10.2 4.0 - 11.0 10e3/uL    RBC Count 4.56 3.80 - 5.20 10e6/uL    Hemoglobin 13.2 11.7 - 15.7 g/dL    Hematocrit 40.9 35.0 - 47.0 %    MCV 90 78 - 100 fL    MCH 28.9 26.5 - 33.0 pg     MCHC 32.3 31.5 - 36.5 g/dL    RDW 12.8 10.0 - 15.0 %    Platelet Count 208 150 - 450 10e3/uL    % Neutrophils 80 %    % Lymphocytes 15 %    % Monocytes 4 %    % Eosinophils 0 %    % Basophils 0 %    % Immature Granulocytes 1 %    NRBCs per 100 WBC 0 <1 /100    Absolute Neutrophils 8.1 1.6 - 8.3 10e3/uL    Absolute Lymphocytes 1.5 0.8 - 5.3 10e3/uL    Absolute Monocytes 0.4 0.0 - 1.3 10e3/uL    Absolute Eosinophils 0.0 0.0 - 0.7 10e3/uL    Absolute Basophils 0.0 0.0 - 0.2 10e3/uL    Absolute Immature Granulocytes 0.1 <=0.4 10e3/uL    Absolute NRBCs 0.0 10e3/uL   Result Value Ref Range    Free T4 1.55 0.90 - 1.70 ng/dL   Troponin T, High Sensitivity (now)   Result Value Ref Range    Troponin T, High Sensitivity 47 (H) <=14 ng/L       EKG:  Performed at: 5:32 AM  Impression: Normal EKG  Rate: 83 bpm  Rhythm: Sinus  Axis: Normal  PA Interval: 144 ms  QRS Interval: 92 ms  QTc Interval: 418 ms  ST Changes: No acute ischemic changes  Comparison: No prior    Performed at: 8:59 AM  Impression: Sinus bradycardia, Incomplete RBBB  Rate: 53 bpm  Rhythm: Sinus  Axis: 0  PA Interval: 128 ms  QRS Interval: 100 ms  QTc Interval: 444 ms  ST Changes: No acute ischemic changes  Comparison: When compared to EKG from earlier today, ventricular rate has decreased by 30 bpm and incomplete RBBB now present.     I have independently reviewed and interpreted the EKG(s) documented above.    ED COURSE :  Pertinent Labs & Imaging studies reviewed. (See chart for details)  68 year old female with history of CKD, hypothyroidism, hypertension, asthma who presents to the Emergency Department for evaluation of chest pain and shortness of breath.  D-dimer elevated and initially tachycardic upon arrival.  Signed out to me pending laboratory work-up and CT PE study.  Patient has mild acute on chronic renal insufficiency with BUN of 44 and creatinine of 1.3 up from 25/1 baseline.  Given 1 L normal saline bolus.  BNP normal.  Troponin indeterminate  range, will repeat.  Remainder of laboratory work-up unremarkable.  CT PE study unremarkable.  Updated patient and family at bedside.  Plan for delta troponin.  Patient is asymptomatic laying comfortably in bed.  Comfortable with plan.  There is no signs of any pericarditis on her EKG.  With chest pain and pleuritic component, shortness of breath may be her underlying asthma.  Repeat troponin is increased.  Repeat EKG remains nonischemic.  Discussed with cardiology.  Recommends stress echo, admitted to medicine for same.          ED Course as of 09/15/23 0927   Fri Sep 15, 2023   0552 Pulse: 117   0621 D-Dimer Quantitative(!): 1.30   0633 Creatinine(!): 1.37  Baseline 1.0   0633 Urea Nitrogen(!): 44.7  Baseline 25   0633 Troponin T, High Sensitivity(!): 22  Indeterminate will repeat    0706 Updated patient on lab and CT results.   0853 Troponin T, High Sensitivity(!): 47   0908 I spoke to Dr. Liu, cardiology, regarding patient's plan for care.   0920 Family at bedside updated.  She has not taken her morning antihypertensives.  Will administer these.  Patient comfortable with plan for admission and stress echo   0924 Spoke to resident hospitalist,  who accepts patient for admission.       FINAL IMPRESSION:  1. Chest pain, unspecified type    2. Dyspnea, unspecified type    3. Elevated troponin          The creation of this record is based on the scribe s observations of the work being performed by Allegra Shepherd MD and the provider s statements to them. It was created on his behalf by Stanton Valle, a trained medical scribe. This document has been checked and approved by the attending provider.    Allegra Shepherd MD  Emergency Medicine  [facility] EMERGENCY DEPARTMENT  [address] [place] [id number] [phone]  Dept: [phone]       Allegra Shepherd MD  09/15/23 0927

## 2023-09-15 NOTE — CONSULTS
Cardiology Consult Note    Thank you, Dr. Shepherd, for asking the [facility] team to see Jamee Muhammad in consultation at [facility] ED to evaluate chest pain shortness of breath.      Assessment:   1.  Chest tightness, shortness of breath, suspicious for acute coronary syndrome given rise in troponin level from 22-47 and now up to 85.  Her ECG shows no acute ischemic changes; however, this could be due to ischemia in the distribution of left circumflex which can be electrocardiographically silent.  She has been initiated on heparin anticoagulation along with IV fluids given some mild renal insufficiency and prior CT PE run.  Have advised coronary angiography to define her anatomy and potential revascularization.  Discussed risks and benefits with the patient and she is agreeable to proceed.  2.  Essential hypertension, treated with combination lisinopril-HCTZ.  Blood pressure running high this morning although this may be related to ongoing pain.  Will reassess following her procedure but would hold lisinopril HCTZ for now given her mild renal insufficiency.  3.  Mild hypercholesterolemia.  Her most recent lipid profile in June demonstrated total cholesterol of 197 with an LDL of 93.  Will initiate atorvastatin 40 mg daily in an effort to drive her LDL below 70.  4.  CKD, stage IIIa     Plan:   1.  Initiate heparin infusion  2.  IV fluids given previous dye load and need for repeat dye load with coronary angiography  3.  Coronary angiogram with possible percutaneous intervention if clinically indicated    Primary cardiologist: None     Current History:   Ms. Jamee Muhammad is a 68 year old female with history of essential hypertension and mild hypercholesterolemia who awakened this morning with 8-9/10 chest discomfort which radiated into her armpits and was associated with shortness of breath.  She rolled onto her stomach as well as her side without any improvement in her discomfort and ultimately  sat up but again noted no improvement.  When her discomfort had not resolved after an hour, she contacted her daughter who is a nurse who advised her to call 911.  Instead, she called another daughter who brought her to the ED for evaluation.  Upon arrival, she was still having ongoing chest discomfort.  She was given 4 baby aspirin but otherwise no other medication.  ECG demonstrated sinus rhythm without acute ischemic changes.  Her laboratory studies were significant for mildly elevated troponin of 22 and D-dimer of 1.3.  CT PE run was performed which was negative for pulmonary embolus.  I was subsequently contacted and came down to see the patient where she reported 3 out of 10 chest discomfort.  She was given 1 sublingual nitroglycerin with marked improvement in discomfort now down to 1/10 in intensity.    Past Medical History:   -Essential hypertension  -Mild hypercholesterolemia    Past Surgical History:     Past Surgical History:   Procedure Laterality Date     SECTION  32yrs ago    EYELID LACERATION REPAIR      LAPAROSCOPIC APPENDECTOMY      LAPAROSCOPIC CHOLECYSTECTOMY  2014       Family History:   No family history of premature coronary artery disease    Social History:    reports that she has never smoked. She has never used smokeless tobacco. She reports that she does not drink alcohol and does not use drugs.    Meds:     Current Facility-Administered Medications:     nitroGLYcerin (NITROSTAT) sublingual tablet 0.4 mg, 0.4 mg, Sublingual, Q5 Min PRN, Denisse Liu MD, 0.4 mg at 09/15/23 1044    sodium chloride 0.9% infusion, , Intravenous, Continuous, Denisse Liu MD, Last Rate: 150 mL/hr at 09/15/23 1112, Rate Change at 09/15/23 1112    Current Outpatient Medications:     acetaminophen (TYLENOL) 650 MG CR tablet, Take 1 tablet (650 mg) by mouth every 8 hours as needed for mild pain or fever, Disp: 180 tablet, Rfl: 3    cholecalciferol, vitamin D3, 2,000 unit cap, [CHOLECALCIFEROL, VITAMIN  "D3, 2,000 UNIT CAP] Take 2 capsules by mouth daily., Disp: 100 each, Rfl: 3    levothyroxine (SYNTHROID/LEVOTHROID) 100 MCG tablet, Take 1 tablet (100 mcg) by mouth daily, Disp: 90 tablet, Rfl: 0    lisinopril-hydrochlorothiazide (ZESTORETIC) 20-25 MG tablet, Take 1 tablet by mouth daily, Disp: 90 tablet, Rfl: 3    montelukast (SINGULAIR) 10 MG tablet, Take 1 tablet (10 mg) by mouth daily, Disp: 90 tablet, Rfl: 3      Allergies:   Gadavist [gadobutrol] and Other allergy (see comments) [external allergen needs reconciliation - see comment]    Review of Systems:   A 12 point comprehensive review of systems was negative except as noted in the current history.    Objective:      Physical Exam  Body mass index is 38.95 kg/m .  BP (!) 182/91   Pulse 62   Temp 97.3  F (36.3  C) (Temporal)   Resp 23   Ht 1.448 m (4' 9\")   Wt 81.6 kg (180 lb)   SpO2 98%   BMI 38.95 kg/m      General Appearance:   Well-developed, well-nourished middle aged  female in no acute distress   HEENT:  Normocephalic, atraumatic.  Sclera nonicteric.  Mucous membranes moist.   Neck: No jugular venous distention or carotid bruits   Chest: Symmetric with normal AP diameter   Lungs:   Clear to auscultation and percussion bilaterally   Cardiovascular:   Regular rate and rhythm.  S1, S2 normal.  No murmur or gallop   Abdomen:  Obese, soft, nondistended, nontender.  No organomegaly or mass   Extremities: No peripheral edema, clubbing or cyanosis   Skin: No rash or lesions   Neurologic: Alert and oriented x3.  No gross focal deficits             Cardiographics (personally reviewed)  ECG demonstrates normal sinus rhythm.  No acute ST or T wave abnormality    Imaging (personally reviewed)  No previous cardiac imaging.    Chest CT again negative for pulmonary embolus.  There is dependent atelectasis in the posterior lung bases and linear atelectasis or scarring in the lingula.    Lab Review (personally reviewed all results)  Recent Labs   Lab Test " 06/16/23  1523   CHOL 197   HDL 59   LDL 93   TRIG 225*     Recent Labs   Lab Test 06/16/23  1523   LDL 93     Recent Labs   Lab Test 09/15/23  0552      POTASSIUM 4.4   CHLORIDE 105   CO2 22   *   BUN 44.7*   CR 1.37*   GFRESTIMATED 42*   GIACOMO 9.6     Recent Labs   Lab Test 09/15/23  0552 06/16/23  1523   CR 1.37* 1.07*     No results for input(s): A1C in the last 36638 hours.       Recent Labs   Lab Test 09/15/23  0552   WBC 10.2   HGB 13.2   HCT 40.9   MCV 90        Recent Labs   Lab Test 09/15/23  0552 06/16/23  1523   HGB 13.2 13.5    No results for input(s): TROPONINI in the last 12394 hours.  Recent Labs   Lab Test 09/15/23  0552   NTBNPI 426     Recent Labs   Lab Test 09/15/23  0552   TSH 0.22*     No results for input(s): INR in the last 44666 hours.

## 2023-09-15 NOTE — PLAN OF CARE
PRIMARY DIAGNOSIS: TR BAND RECOVERY   OUTPATIENT/OBSERVATION GOALS TO BE MET BEFORE DISCHARGE:  TR band status: removed  Radial pulse and CMS (circulation, motion, sensation) are WDL: Yes  Bleeding or hematoma present at site: No      Activity restriction education reviewed with patient: Yes. Pink wrist band applied, arm board applied  Stable vital signs:  Yes  ADLs back to baseline:  Yes  Activity and level of assistance: bedrest  Interpretation of rhythm per telemetry tech: Normal sinus      Discharge Planner Nurse   Safe discharge environment identified: Yes  Barriers to discharge: Yes       Entered by: Shoshana York RN 09/15/2023 6:45 PM     Please review provider order for any additional goals.   Nurse to notify provider when observation goals have been met and patient is ready for discharge.Goal Outcome Evaluation:    TR band removed no bleeding, no hematoma, tolerated well, VSS

## 2023-09-15 NOTE — PROGRESS NOTES
Transfer from ER for chest pain and elevated troponin.  No questions.  Anxious.  No chest pain now.  Family present.

## 2023-09-15 NOTE — PHARMACY-ADMISSION MEDICATION HISTORY
Pharmacist Admission Medication History    Admission medication history is complete. The information provided in this note is only as accurate as the sources available at the time of the update.    Medication reconciliation/reorder completed by provider prior to medication history? No    Information Source(s): Patient and CareEverywhere/SureScripts via in-person    Pertinent Information: Patient ran out of montelukast     Changes made to PTA medication list:  Added: None  Deleted: Dulera inhaler, hydroxyzine PRN  Changed: None    Medication Affordability:  Not including over the counter (OTC) medications, was there a time in the past 3 months when you did not take your medications as prescribed because of cost?: No    Allergies reviewed with patient and updates made in EHR: yes    Medication History Completed By: Deepa Pak RPH 9/15/2023 9:36 AM    Prior to Admission medications    Medication Sig Last Dose Taking? Auth Provider Long Term End Date   acetaminophen (TYLENOL) 650 MG CR tablet Take 1 tablet (650 mg) by mouth every 8 hours as needed for mild pain or fever Unknown at prn Yes Rosenstein, Benjamin, MD     cholecalciferol, vitamin D3, 2,000 unit cap [CHOLECALCIFEROL, VITAMIN D3, 2,000 UNIT CAP] Take 2 capsules by mouth daily. 9/14/2023 at am Yes Lexie Sen MD Yes 9/15/23   levothyroxine (SYNTHROID/LEVOTHROID) 100 MCG tablet Take 1 tablet (100 mcg) by mouth daily 9/14/2023 at am Yes Ernie Sánchez MD Yes    lisinopril-hydrochlorothiazide (ZESTORETIC) 20-25 MG tablet Take 1 tablet by mouth daily 9/14/2023 at am Yes Rosenstein, Benjamin, MD Yes    montelukast (SINGULAIR) 10 MG tablet Take 1 tablet (10 mg) by mouth daily Past Month at pm- ran out Yes Rosenstein, Benjamin, MD Yes

## 2023-09-15 NOTE — ED PROVIDER NOTES
EMERGENCY DEPARTMENT ENCOUNTER      NAME: Jamee Muhammad  AGE: 68 year old female  YOB: 1955  MRN: [id number]  EVALUATION DATE & TIME: 9/15/2023  5:31 AM    PCP: Mary Riddle    ED PROVIDER: Jorge A Mg M.D.    Chief Complaint   Patient presents with    Chest Pain       FINAL IMPRESSION:  1. Chest pain, unspecified type    2. Dyspnea, unspecified type        ED COURSE & MEDICAL DECISION MAKING:    Pertinent Labs & Imaging studies independently interpreted by me. (See chart for details)  5:38 AM Patient seen and examined, reviewed most recent clinic visit which was a medical examination to establish care, patient with chronic kidney disease as well as hypothyroidism and hypertension, got refills of her levothyroxine and Zestoretic.  Differential diagnosis includes but not limited to chest wall pain, esophagitis, myocardial infarction, pneumonia, rib fracture, pulmonary embolism, pneumothorax, aortic dissection, aortic aneurysm.  Patient presents today with left-sided chest pain going into her back accompanied by shortness of breath and orthopnea.  On exam here, tachycardic on initial arrival although this is improved with rest.  Lungs are clear although was short of breath and visibly dyspneic when walking back from the bathroom.  Labs and chest x-ray ordered.  6:12 AM labs independently interpreted by me with reassuring basic panel, elevated D-dimer, PE study is ordered.  6:22 AM Signout to oncoming provider pending labs and CT PE.    At the conclusion of the encounter I discussed the results of all of the tests and the disposition. The questions were answered. The patient or family acknowledged understanding and was agreeable with the care plan.     Medical Decision Making    History:  Supplemental history from: Documented in chart, if applicable and Family Member/Significant Other  External Record(s) reviewed: Documented in chart, if applicable.    Work Up:  Chart documentation includes  "differential considered and any EKGs or imaging independently interpreted by provider, where specified.  In additional to work up documented, I considered the following work up: Documented in chart, if applicable.    External consultation:  Discussion of management with another provider: Documented in chart, if applicable    Complicating factors:  Care impacted by chronic illness: Chronic Kidney Disease, Chronic Pain, Diabetes, and Hypertension  Care affected by social determinants of health: Access to Medical Care    Disposition considerations: Admission considered. Patient was signed out to the oncoming physician, disposition pending.        EKG:    Performed at: 5:32 AM  Impression: Normal EKG  Rate: 83  Rhythm: Sinus  Axis: Normal  FL Interval: 144  QRS Interval: 92  QTc Interval: 418  ST Changes: No acute ischemic changes  Comparison: No prior    I have independently reviewed and interpreted the EKG(s) documented above.    PROCEDURES:       MEDICATIONS GIVEN IN THE EMERGENCY:  Medications - No data to display    NEW PRESCRIPTIONS STARTED AT TODAY'S ER VISIT  New Prescriptions    No medications on file       =================================================================    HPI    Patient information was obtained from: The patient      Jamee Muhammad is a 68 year old female with a pertinent history of stage 3 CKD, hypertension, chronic pain syndrome who presents to this ED via walk for evaluation of chest pain.    The patient woke up this morning around 3 AM with a sudden onset of left-sided chest pain and associates feeling short of breath with it. Her chest pain and shortness of breath worsens when laying down flat. She tried switching positions when she lays down, but she reports it made her symptoms worse as well. She also states that both arms feels \"down and not working.\" Her shortness of breath improves when sitting upright. The patient reports usually waking up around 3 AM. She also takes her thyroid " medication in the morning, but she did not take her dose today yet. The patient also takes chronic medications for diabetes and hypertension. She reports having a cholecystectomy and  in the past.     Otherwise, the patient denied having coughs, fevers, and any other medical complaints or concerns at this time.      REVIEW OF SYSTEMS   Review of Systems   Constitutional:  Negative for fever.   Respiratory:  Positive for shortness of breath. Negative for cough.    Cardiovascular:  Positive for chest pain (left-sided).   Musculoskeletal:         Positive for pain to bilateral upper extremities   All other systems reviewed and are negative.     All other systems reviewed and negative    PAST MEDICAL HISTORY:  No past medical history on file.    PAST SURGICAL HISTORY:  Past Surgical History:   Procedure Laterality Date     SECTION  32yrs ago    EYELID LACERATION REPAIR      LAPAROSCOPIC APPENDECTOMY      LAPAROSCOPIC CHOLECYSTECTOMY  2014       CURRENT MEDICATIONS:    No current facility-administered medications for this encounter.     Current Outpatient Medications   Medication    acetaminophen (TYLENOL) 650 MG CR tablet    blood pressure monitor (BLOOD PRESSURE KIT) Kit    cholecalciferol, vitamin D3, 2,000 unit cap    HUMIDIFIERS MISC    hydrOXYzine (ATARAX) 50 MG tablet    levothyroxine (SYNTHROID/LEVOTHROID) 100 MCG tablet    lisinopril-hydrochlorothiazide (ZESTORETIC) 20-25 MG tablet    mometasone-formoterol (DULERA) 200-5 MCG/ACT inhaler    montelukast (SINGULAIR) 10 MG tablet       ALLERGIES:  Allergies   Allergen Reactions    Gadavist [Gadobutrol] Other (See Comments) and Headache     Patient had sensitivity to the Gadavist on 2015.  She claimed she could not breath or swallow well.  After a few minutes she was fine.  Dr. Valente took her vitals and accessed she was fine and probably did not have a reaction.  Dr. Valente recommends the patient have MRI studies done in a hospital setting  "from now on.  Dr. Mae recommended on 6/18/2015 that the patient be pre-medicated if she needs MRI contrast in the future. - CAB    Other Allergy (See Comments) [External Allergen Needs Reconciliation - See Comment] Unknown     Other, 05/30/2013.  Action: Blanka.         FAMILY HISTORY:  No family history on file.    SOCIAL HISTORY:   Social History     Socioeconomic History    Marital status: Single   Tobacco Use    Smoking status: Never    Smokeless tobacco: Never   Substance and Sexual Activity    Alcohol use: No    Drug use: No       VITALS:  BP (!) 170/109   Pulse 117   Temp 97.3  F (36.3  C) (Temporal)   Resp 22   Ht 1.448 m (4' 9\")   Wt 81.6 kg (180 lb)   SpO2 98%   BMI 38.95 kg/m      PHYSICAL EXAM:  Physical Exam  Vitals and nursing note reviewed.   Constitutional:       Appearance: Normal appearance.   HENT:      Head: Normocephalic and atraumatic.      Right Ear: External ear normal.      Left Ear: External ear normal.      Nose: Nose normal.      Mouth/Throat:      Mouth: Mucous membranes are moist.   Eyes:      Extraocular Movements: Extraocular movements intact.      Conjunctiva/sclera: Conjunctivae normal.      Pupils: Pupils are equal, round, and reactive to light.   Cardiovascular:      Rate and Rhythm: Normal rate and regular rhythm.   Pulmonary:      Effort: Pulmonary effort is normal.      Breath sounds: Normal breath sounds. No wheezing or rales.      Comments: Dyspneic with talking.  Abdominal:      General: Abdomen is flat. There is no distension.      Palpations: Abdomen is soft.      Tenderness: There is no abdominal tenderness. There is no guarding.   Musculoskeletal:         General: Normal range of motion.      Cervical back: Normal range of motion and neck supple.      Right lower leg: No edema.      Left lower leg: No edema.   Lymphadenopathy:      Cervical: No cervical adenopathy.   Skin:     General: Skin is warm and dry.   Neurological:      General: No focal deficit present. "      Mental Status: She is alert and oriented to person, place, and time. Mental status is at baseline.      Comments: No gross focal neurologic deficits   Psychiatric:         Mood and Affect: Mood normal.         Behavior: Behavior normal.         Thought Content: Thought content normal.          LAB:  All pertinent labs reviewed and interpreted.  Results for orders placed or performed during the hospital encounter of 09/15/23   D dimer quantitative   Result Value Ref Range    D-Dimer Quantitative 1.30 (H) 0.00 - 0.50 ug/mL FEU   CBC with platelets and differential   Result Value Ref Range    WBC Count 10.2 4.0 - 11.0 10e3/uL    RBC Count 4.56 3.80 - 5.20 10e6/uL    Hemoglobin 13.2 11.7 - 15.7 g/dL    Hematocrit 40.9 35.0 - 47.0 %    MCV 90 78 - 100 fL    MCH 28.9 26.5 - 33.0 pg    MCHC 32.3 31.5 - 36.5 g/dL    RDW 12.8 10.0 - 15.0 %    Platelet Count 208 150 - 450 10e3/uL    % Neutrophils 80 %    % Lymphocytes 15 %    % Monocytes 4 %    % Eosinophils 0 %    % Basophils 0 %    % Immature Granulocytes 1 %    NRBCs per 100 WBC 0 <1 /100    Absolute Neutrophils 8.1 1.6 - 8.3 10e3/uL    Absolute Lymphocytes 1.5 0.8 - 5.3 10e3/uL    Absolute Monocytes 0.4 0.0 - 1.3 10e3/uL    Absolute Eosinophils 0.0 0.0 - 0.7 10e3/uL    Absolute Basophils 0.0 0.0 - 0.2 10e3/uL    Absolute Immature Granulocytes 0.1 <=0.4 10e3/uL    Absolute NRBCs 0.0 10e3/uL       RADIOLOGY:  Reviewed all pertinent imaging. Please see official radiology report.  XR Chest Port 1 View    (Results Pending)   CT Chest Pulmonary Embolism w Contrast    (Results Pending)       I, Jason Dumont, am serving as a scribe to document services personally performed by Dr. Mg based on my observation and the provider's statements to me. I, Jorge A Mg MD attest that Jason Dumont is acting in a scribe capacity, has observed my performance of the services and has documented them in accordance with my direction.    Jorge A Mg M.D.  Emergency  Medicine  [facility] EMERGENCY DEPARTMENT  [facility] [facility] [id number] [phone]  Dept: [phone]       Jorge A Mg MD  09/15/23 0623

## 2023-09-15 NOTE — CONSULTS
Care Management Initial Consult    General Information  Assessment completed with: Patient, Children, patient, daughter Shalonda  Type of CM/SW Visit: Initial Assessment    Primary Care Provider verified and updated as needed: Yes   Readmission within the last 30 days: no previous admission in last 30 days      Reason for Consult: discharge planning, utilization management concerns  Advance Care Planning: Advance Care Planning Reviewed: no concerns identified          Communication Assessment  Patient's communication style: spoken language (English or Bilingual)             Cognitive  Cognitive/Neuro/Behavioral: WDL                      Living Environment:   People in home: child(prisca), adult, grandchild(prisca)     Current living Arrangements: house      Able to return to prior arrangements: yes       Family/Social Support:  Care provided by: self  Provides care for: no one  Marital Status: Single  Children          Description of Support System: Supportive, Involved    Support Assessment: Patient communicates needs well met, Adequate family and caregiver support    Current Resources:   Patient receiving home care services: No     Community Resources: None  Equipment currently used at home:    Supplies currently used at home: None    Employment/Financial:  Employment Status: retired        Financial Concerns:             Does the patient's insurance plan have a 3 day qualifying hospital stay waiver?  No    Lifestyle & Psychosocial Needs:  Social Determinants of Health     Tobacco Use: Low Risk  (7/1/2021)    Patient History     Smoking Tobacco Use: Never     Smokeless Tobacco Use: Never     Passive Exposure: Not on file   Alcohol Use: Not on file   Financial Resource Strain: Not on file   Food Insecurity: Not on file   Transportation Needs: Not on file   Physical Activity: Not on file   Stress: Not on file   Social Connections: Not on file   Intimate Partner Violence: Not on file   Depression: Not at risk (6/16/2023)     PHQ-2     PHQ-2 Score: 0   Housing Stability: Not on file       Functional Status:  Prior to admission patient needed assistance:   Dependent ADLs:: Independent, Ambulation-cane (uses cane prn)  Dependent IADLs:: Independent       Mental Health Status:          Chemical Dependency Status:                Values/Beliefs:  Spiritual, Cultural Beliefs, Christianity Practices, Values that affect care:                 Additional Information:  Lives w/son John Paul and family. Independent at baseline. Drives. Uses cane prn. NEVAREZ discussed. Likely no discharge needs. Family to transport.     Norma Tinsley RN

## 2023-09-16 VITALS
WEIGHT: 178.3 LBS | DIASTOLIC BLOOD PRESSURE: 70 MMHG | OXYGEN SATURATION: 96 % | SYSTOLIC BLOOD PRESSURE: 147 MMHG | RESPIRATION RATE: 20 BRPM | HEART RATE: 65 BPM | HEIGHT: 57 IN | TEMPERATURE: 97.9 F | BODY MASS INDEX: 38.47 KG/M2

## 2023-09-16 LAB
ANION GAP SERPL CALCULATED.3IONS-SCNC: 11 MMOL/L (ref 7–15)
ATRIAL RATE - MUSE: 53 BPM
ATRIAL RATE - MUSE: 83 BPM
BUN SERPL-MCNC: 34.2 MG/DL (ref 8–23)
CALCIUM SERPL-MCNC: 8.6 MG/DL (ref 8.8–10.2)
CHLORIDE SERPL-SCNC: 109 MMOL/L (ref 98–107)
CREAT SERPL-MCNC: 1.01 MG/DL (ref 0.51–0.95)
DEPRECATED HCO3 PLAS-SCNC: 21 MMOL/L (ref 22–29)
DIASTOLIC BLOOD PRESSURE - MUSE: 83 MMHG
DIASTOLIC BLOOD PRESSURE - MUSE: NORMAL MMHG
EGFRCR SERPLBLD CKD-EPI 2021: 61 ML/MIN/1.73M2
GLUCOSE SERPL-MCNC: 103 MG/DL (ref 70–99)
HOLD SPECIMEN: NORMAL
INTERPRETATION ECG - MUSE: NORMAL
INTERPRETATION ECG - MUSE: NORMAL
P AXIS - MUSE: 43 DEGREES
P AXIS - MUSE: 66 DEGREES
POTASSIUM SERPL-SCNC: 3.8 MMOL/L (ref 3.4–5.3)
PR INTERVAL - MUSE: 128 MS
PR INTERVAL - MUSE: 144 MS
QRS DURATION - MUSE: 100 MS
QRS DURATION - MUSE: 92 MS
QT - MUSE: 356 MS
QT - MUSE: 474 MS
QTC - MUSE: 418 MS
QTC - MUSE: 444 MS
R AXIS - MUSE: -22 DEGREES
R AXIS - MUSE: 0 DEGREES
SODIUM SERPL-SCNC: 141 MMOL/L (ref 136–145)
SYSTOLIC BLOOD PRESSURE - MUSE: 175 MMHG
SYSTOLIC BLOOD PRESSURE - MUSE: NORMAL MMHG
T AXIS - MUSE: 59 DEGREES
T AXIS - MUSE: 70 DEGREES
VENTRICULAR RATE- MUSE: 53 BPM
VENTRICULAR RATE- MUSE: 83 BPM

## 2023-09-16 PROCEDURE — 250N000013 HC RX MED GY IP 250 OP 250 PS 637

## 2023-09-16 PROCEDURE — 80048 BASIC METABOLIC PNL TOTAL CA: CPT

## 2023-09-16 PROCEDURE — 250N000013 HC RX MED GY IP 250 OP 250 PS 637: Performed by: INTERNAL MEDICINE

## 2023-09-16 PROCEDURE — 99207 PR NO CHARGE LOS: CPT | Performed by: INTERNAL MEDICINE

## 2023-09-16 PROCEDURE — 99238 HOSP IP/OBS DSCHRG MGMT 30/<: CPT | Mod: GC

## 2023-09-16 PROCEDURE — 36415 COLL VENOUS BLD VENIPUNCTURE: CPT

## 2023-09-16 PROCEDURE — G0378 HOSPITAL OBSERVATION PER HR: HCPCS

## 2023-09-16 RX ORDER — AMLODIPINE BESYLATE 5 MG/1
5 TABLET ORAL DAILY
Qty: 30 TABLET | Refills: 0 | Status: SHIPPED | OUTPATIENT
Start: 2023-09-17 | End: 2023-09-25

## 2023-09-16 RX ORDER — ATORVASTATIN CALCIUM 40 MG/1
40 TABLET, FILM COATED ORAL EVERY EVENING
Qty: 30 TABLET | Refills: 0 | Status: SHIPPED | OUTPATIENT
Start: 2023-09-16 | End: 2023-09-25

## 2023-09-16 RX ADMIN — MONTELUKAST 10 MG: 10 TABLET, FILM COATED ORAL at 07:35

## 2023-09-16 RX ADMIN — AMLODIPINE BESYLATE 5 MG: 5 TABLET ORAL at 07:35

## 2023-09-16 RX ADMIN — LEVOTHYROXINE SODIUM 100 MCG: 100 TABLET ORAL at 06:35

## 2023-09-16 ASSESSMENT — ACTIVITIES OF DAILY LIVING (ADL)
ADLS_ACUITY_SCORE: 40

## 2023-09-16 NOTE — PLAN OF CARE
PRIMARY DIAGNOSIS: CHEST PAIN  OUTPATIENT/OBSERVATION GOALS TO BE MET BEFORE DISCHARGE:  1. Ruled out acute coronary syndrome (negative or stable Troponin):  No  2. Pain Status: Pain free.  3. Appropriate provocative testing performed: Yes  - Stress Test Procedure: Angiogram  - Interpretation of cardiac rhythm per telemetry tech: NSR    4. Cleared by Consultants (if applicable):No  5. Return to near baseline physical activity: Yes  Discharge Planner Nurse   Safe discharge environment identified: Yes  Barriers to discharge: No       Entered by: Maya Menjivar RN 09/15/2023 10:58 PM     Please review provider order for any additional goals.   Nurse to notify provider when observation goals have been met and patient is ready for discharge.  Goal Outcome Evaluation:       Pt. Denies pain or cardiac distress. Angio site WDL. Ambulated to the bathroom and tolerated well.

## 2023-09-16 NOTE — DISCHARGE SUMMARY
"[facility]  Discharge Summary - Medicine & Pediatrics       Date of Admission:  9/15/2023  Date of Discharge:  9/16/2023  1:53 PM  Discharging Provider: Mimi Valle  Discharge Service: Hospitalist Service    Discharge Diagnoses   Chest pain  Elevated troponin  Nonobstructive coronary artery disease    Clinically Significant Risk Factors     # Obesity: Estimated body mass index is 38.58 kg/m  as calculated from the following:    Height as of this encounter: 1.448 m (4' 9\").    Weight as of this encounter: 80.9 kg (178 lb 4.8 oz).       Follow-ups Needed After Discharge   Follow-up Appointments     Follow-up and recommended labs and tests       Follow up with primary care provider, Mary Riddle, within 7 days for   hospital follow- up.  The following labs/tests are recommended: BMP.    Follow up with cardiology, Dr. Liu, in three months. They will contact   you to schedule this.        PCP:   - Patient was restarted on home lisinopril/hydrochlorothiazide, recommend checking BMP  - Started on statin and amlodipine per cardiology  - She will need to follow-up with Dr. Liu in three months (ensure appointment is in place  - Her A1C was 6.0, prediabetes range which is a new diagnosis for her  - Her TSH was 0.22 w/ T4 normal. Consider follow-up/titration levothyroxine    Unresulted Labs Ordered in the Past 30 Days of this Admission       No orders found for last 31 day(s).        These results will be followed up by N/A    Discharge Disposition   Discharged to home  Condition at discharge: Good    Hospital Course   Jamee Muhammad is a 68 year old female with PMH significant for CKD, hypothyroidism and HTN who presented with new onset of chest pain and shortness of breath concerning for ACS. Her troponin elevated with 22, 47, and 85. She was started on a heparin infusion. She was evaluated by cardiology and underwent coronary angiogram; this revealed non obstructing coronary artery disease " for which she was recommended medication management. She was started on atorvastatin 40mg daily. Cardiology also started 5mg amlodipine. Her ECHO came back normal, PE run negative, and ECG without ischemic changes. On the day of discharge, she was completely asymptomatic.     Consultations This Hospital Stay   PHARMACY IP CONSULT  CARDIOLOGY IP CONSULT  CARE MANAGEMENT / SOCIAL WORK IP CONSULT  PHARMACY IP CONSULT  PHARMACY IP CONSULT  SMOKING CESSATION PROGRAM IP CONSULT    Code Status   Full Code       The patient was discussed with Dr. Mimi Valle MD  [place] [facility]  [address] [place] [id number]  Phone: [phone]  Fax: [phone]  ______________________________________________________________________    Physical Exam   Vital Signs: Temp: 97.9  F (36.6  C) Temp src: Oral BP: (!) 147/70 Pulse: 65   Resp: 20 SpO2: 96 % O2 Device: None (Room air) Oxygen Delivery: 4 LPM  Weight: 178 lbs 4.8 oz  General:  Appears well and in no acute distress  Psych:  Alert and oriented to person, place, and time. Able to articulate logical thoughts.   HEENT:  Eyes grossly normal to inspection. Extraocular movements intact. Pupils equal, round, and reactive to light. Mucous membranes moist. No ulcers, erythremia, or lesions noted in the oropharynx.  Cardiovascular:  Regular rate and rhythm, normal S1 and S2 without murmur.   Respiratory:  Lungs clear to auscultation bilaterally. No wheezing or crackles.   Neuro:  Clear coherent speech. CNII-XII intact. Good  strength.  Musculoskeletal:  No gross extremity deformities. No peripheral edema.  GI:  Soft, non-tender abdomen.   Derm:  No suspicious lesions or rashes      Primary Care Physician   Mary Riddle    Discharge Orders      Reason for your hospital stay    Hospitalized for chest pain. Had a cardiac work-up, no further intervention needed. Please continue the medications as prescribed  during your hospital stay.     Follow-up and recommended labs and tests     Follow up with primary care provider, Mary Riddle, within 7 days for hospital follow- up.  The following labs/tests are recommended: BMP.    Follow up with cardiology, Dr. Liu, in three months. They will contact you to schedule this.     Activity    Your activity upon discharge: activity as tolerated     Diet    Follow this diet upon discharge: Orders Placed This Encounter      Advance Diet as Tolerated: Regular Diet Adult       Significant Results and Procedures   Results for orders placed or performed during the hospital encounter of 09/15/23   CT Chest Pulmonary Embolism w Contrast    Narrative    EXAM: CT CHEST PULMONARY EMBOLISM W CONTRAST  LOCATION: [facility]  DATE: 9/15/2023    INDICATION: Chest pain, dyspnea, elevated D dimer  COMPARISON: None.  TECHNIQUE: CT chest pulmonary angiogram during arterial phase injection of IV contrast. Multiplanar reformats and MIP reconstructions were performed. Dose reduction techniques were used.   CONTRAST: IsoVue 370 75mL    FINDINGS:  ANGIOGRAM CHEST: Pulmonary arteries are normal caliber and negative for pulmonary emboli. No thoracic aortic aneurysm. No CT evidence of right heart strain.    LUNGS AND PLEURA: Linear atelectasis or scarring in the lingula. Dependent atelectasis in the posterior lung bases. No pleural effusions. Benign pleural thickening in the posterior lung bases.    MEDIASTINUM/AXILLAE: No lymphadenopathy. Normal esophagus. No significant pericardial effusion.    CORONARY ARTERY CALCIFICATION: None.    UPPER ABDOMEN: The gallbladder is surgically absent. Vascular calcifications abdominal aorta and splenic artery. Otherwise unremarkable    MUSCULOSKELETAL: No concerning osseous abnormalities.      Impression    IMPRESSION:  1.  No pulmonary embolism seen.    2.  Linear atelectasis or scarring in the lingula. Dependent atelectasis in the posterior  lung bases. No pleural effusions. Benign pleural thickening in the posterior lung bases.   Echocardiogram Complete     Value    LVEF  60-65%    [facility]    [id number]  [id number]  [id number]  [id number]^DEBRA^TANIKA^[place] [address]     Name: ERIC RODRIGUEZ  MRN: [id number]  : 1955  Study Date: 09/15/2023 03:19 PM  Age: 68 yrs  Gender: Female  Patient Location: [facility]  Reason For Study: Chest Pain  Ordering Physician: TANIKA RICHARDSON  Referring Physician: ADRYAN NINO  Performed By:      BSA: 1.7 m2  Height: 57 in  Weight: 180 lb  HR: 51  ______________________________________________________________________________  Procedure  Complete Echo Adult. Definity (NDC #[id number]) given intravenously.  ______________________________________________________________________________  Interpretation Summary     Left ventricular size, wall motion and function are normal. The ejection  fraction is 60-65%.  Normal right ventricle size and systolic function.  No significant valvular disease.     No previous study for comparison.     ______________________________________________________________________________  I      WMSI = 1.00     % Normal = 100     X - Cannot   0 -                      (2) - Mildly 2 -          Segments  Size  Interpret    Hyperkinetic 1 - Normal  Hypokinetic  Hypokinetic  1-2     small                                                     7 -          3-5      moderate  3 - Akinetic 4 -          5 -         6 - Akinetic Dyskinetic   6-14    large               Dyskinetic   Aneurysmal  w/scar       w/scar       15-16   diffuse     Left Ventricle  Left ventricular size, wall motion and function are normal. The ejection  fraction is 60-65%. There is normal left ventricular wall thickness. Left  ventricular diastolic function is normal. No regional wall motion  abnormalities noted.     Right Ventricle  Normal right ventricle size and systolic function.      Atria  Normal left atrial size. Right atrial size is normal. There is no atrial shunt  seen.     Mitral Valve  Mitral valve leaflets appear normal. There is no evidence of mitral stenosis  or clinically significant mitral regurgitation.     Tricuspid Valve  Tricuspid valve leaflets appear normal. There is no evidence of tricuspid  stenosis or clinically significant tricuspid regurgitation.     Aortic Valve  Aortic valve leaflets appear normal. There is no evidence of aortic stenosis  or clinically significant aortic regurgitation.     Pulmonic Valve  The pulmonic valve is not well seen, but is grossly normal.     Vessels  The aorta root is normal. IVC diameter <2.1 cm collapsing >50% with sniff  suggests a normal RA pressure of 3 mmHg.     Pericardium  There is no pericardial effusion.     Rhythm  Sinus rhythm was noted.     ______________________________________________________________________________  MMode/2D Measurements & Calculations  IVSd: 0.79 cm  LVIDd: 4.0 cm  LVIDs: 2.5 cm  LVPWd: 0.85 cm  FS: 39.3 %     LV mass(C)d: 99.0 grams  LV mass(C)dI: 57.6 grams/m2  Ao root diam: 3.2 cm  LA dimension: 3.4 cm  asc Aorta Diam: 3.3 cm  LA/Ao: 1.1  LVOT diam: 1.9 cm  LVOT area: 2.8 cm2  Ao root diam index Ht(cm/m): 2.2  Ao root diam index BSA (cm/m2): 1.9  Asc Ao diam index BSA (cm/m2): 1.9  Asc Ao diam index Ht(cm/m): 2.3  LA Volume Indexed (AL/bp): 26.0 ml/m2  RV Base: 4.4 cm     RWT: 0.42  TAPSE: 2.5 cm     Time Measurements  MM HR: 51.0 BPM     Doppler Measurements & Calculations  MV E max robbie: 90.1 cm/sec  MV A max robbie: 83.8 cm/sec  MV E/A: 1.1  MV dec slope: 491.0 cm/sec2  MV dec time: 0.18 sec  Ao V2 max: 147.0 cm/sec  Ao max P.0 mmHg  Ao V2 mean: 99.1 cm/sec  Ao mean P.0 mmHg  Ao V2 VTI: 34.9 cm  NEETA(I,D): 1.8 cm2  NEETA(V,D): 2.1 cm2  LV V1 max P.8 mmHg  LV V1 max: 109.0 cm/sec  LV V1 VTI: 22.5 cm  SV(LVOT): 63.8 ml  SI(LVOT): 37.1 ml/m2  PA acc time: 0.10 sec  TR max robbie: 205.0 cm/sec  TR max PG:  16.8 mmHg  AV Aden Ratio (DI): 0.74  NEETA Index (cm2/m2): 1.1     E/E' av.4  Lateral E/e': 7.6  Medial E/e': 7.2  RV S Aden: 12.4 cm/sec     ______________________________________________________________________________  Report approved by: Katty Laguerre 09/15/2023 03:55 PM         Cardiac Catheterization    Narrative      1st Mrg lesion is 50% stenosed.    Mid LAD lesion is 40% stenosed.    1st Diag lesion is 40% stenosed.    Prox RCA lesion is 30% stenosed.    Left ventricular filling pressures are moderately elevated.    1.  Mild to moderate coronary disease but no severe stenoses  2.  Moderately elevated LVEDP 23 to 28 mmHg  3.  Recommend echocardiogram to rule out stress cardiomyopathy         Discharge Medications   Discharge Medication List as of 2023  1:42 PM        START taking these medications    Details   amLODIPine (NORVASC) 5 MG tablet Take 1 tablet (5 mg) by mouth daily, Disp-30 tablet, R-0, E-Prescribe      atorvastatin (LIPITOR) 40 MG tablet Take 1 tablet (40 mg) by mouth every evening, Disp-30 tablet, R-0, E-Prescribe           CONTINUE these medications which have NOT CHANGED    Details   acetaminophen (TYLENOL) 650 MG CR tablet Take 1 tablet (650 mg) by mouth every 8 hours as needed for mild pain or fever, Disp-180 tablet, R-3, E-Prescribe      cholecalciferol, vitamin D3, 2,000 unit cap [CHOLECALCIFEROL, VITAMIN D3, 2,000 UNIT CAP] Take 2 capsules by mouth daily., Disp-100 each, R-3, Normal      levothyroxine (SYNTHROID/LEVOTHROID) 100 MCG tablet Take 1 tablet (100 mcg) by mouth daily, Disp-90 tablet, R-0, E-Prescribe      lisinopril-hydrochlorothiazide (ZESTORETIC) 20-25 MG tablet Take 1 tablet by mouth daily, Disp-90 tablet, R-3, E-Prescribe      montelukast (SINGULAIR) 10 MG tablet Take 1 tablet (10 mg) by mouth daily, Disp-90 tablet, R-3, E-Prescribe           Allergies   Allergies   Allergen Reactions    Gadavist [Gadobutrol] Other (See Comments) and Headache     Patient had  sensitivity to the Gadavist on 6/17/2015.  She claimed she could not breath or swallow well.  After a few minutes she was fine.  Dr. Valente took her vitals and accessed she was fine and probably did not have a reaction.  Dr. Valente recommends the patient have MRI studies done in a hospital setting from now on.  Dr. Mae recommended on 6/18/2015 that the patient be pre-medicated if she needs MRI contrast in the future. - CAB    Other Allergy (See Comments) [External Allergen Needs Reconciliation - See Comment] Unknown     Other, 05/30/2013.  Action: Blanka.

## 2023-09-16 NOTE — PLAN OF CARE
Goal Outcome Evaluation:         PRIMARY DIAGNOSIS: CHEST PAIN  OUTPATIENT/OBSERVATION GOALS TO BE MET BEFORE DISCHARGE:  1. Ruled out acute coronary syndrome (negative or stable Troponin):  No  2. Pain Status: Pain free.  3. Appropriate provocative testing performed: Yes  - Stress Test Procedure: Angiogram  - Interpretation of cardiac rhythm per telemetry tech: NSR    4. Cleared by Consultants (if applicable):No  5. Return to near baseline physical activity: Yes  Discharge Planner Nurse   Safe discharge environment identified: Yes  Barriers to discharge: No       Entered by: Maya Menjivar RN 09/16/2023 4:53 AM     Please review provider order for any additional goals.   Nurse to notify provider when observation goals have been met and patient is ready for discharge.  VSS stable on room air, denies pain, on IV NS @ 75ml/hr. Voiding well

## 2023-09-16 NOTE — PROGRESS NOTES
[facility]  Cardiology     Progress Note: Jesse Moran MD    Primary Care: Mary Saleem    Primary Cardiology: Denisse Liu MD    Assessment:       Chart check  Patient with nonobstructive coronary artery disease.  Overnight vitals are stable with no recurrent symptoms.  Okay for discharge on current medications  Patient will be scheduled for follow-up with Dr. Liu in 3 months.    Principal Problem:    Dyspnea, unspecified type  Active Problems:    Chest pain, unspecified type    NSTEMI (non-ST elevated myocardial infarction) (H)     LOS: 0 days

## 2023-09-16 NOTE — PLAN OF CARE
PRIMARY DIAGNOSIS: CHEST PAIN  OUTPATIENT/OBSERVATION GOALS TO BE MET BEFORE DISCHARGE:  1. Ruled out acute coronary syndrome (negative or stable Troponin):  No  2. Pain Status: Pain free.  3. Appropriate provocative testing performed: Yes  - Stress Test Procedure: Angiogram  - Interpretation of cardiac rhythm per telemetry tech: NSR    4. Cleared by Consultants (if applicable):No  5. Return to near baseline physical activity: Yes  Discharge Planner Nurse   Safe discharge environment identified: Yes  Barriers to discharge: No       Entered by: Maya Menjivar RN 09/16/2023 7:04 AM     Please review provider order for any additional goals.   Nurse to notify provider when observation goals have been met and patient is ready for discharge.  Goal Outcome Evaluation:       VSS stable, Angio site WDL, CMS +, denies pain

## 2023-09-16 NOTE — PROGRESS NOTES
Care Management Discharge Note    Discharge Date: 09/16/2023       Discharge Disposition:  home    Discharge Services:  none    Discharge DME:  none    Discharge Transportation: family or friend will provide    Private pay costs discussed: Not applicable    Does the patient's insurance plan have a 3 day qualifying hospital stay waiver?  No    PAS Confirmation Code:    Patient/family educated on Medicare website which has current facility and service quality ratings:      Education Provided on the Discharge Plan:    Persons Notified of Discharge Plans: yes  Patient/Family in Agreement with the Plan:      Handoff Referral Completed: Yes    Additional Information:  Pt adequate for discharge not CM needs identified. CM will sign off.    Lala Cooper RN

## 2023-09-17 RX ORDER — ASPIRIN 81 MG/1
81 TABLET ORAL DAILY
Qty: 30 TABLET | Refills: 0 | Status: SHIPPED | OUTPATIENT
Start: 2023-09-17

## 2023-09-18 DIAGNOSIS — I21.4 NSTEMI (NON-ST ELEVATED MYOCARDIAL INFARCTION) (H): Primary | ICD-10-CM

## 2023-09-19 ENCOUNTER — PATIENT OUTREACH (OUTPATIENT)
Dept: CARE COORDINATION | Facility: CLINIC | Age: 68
End: 2023-09-19

## 2023-09-19 ENCOUNTER — DOCUMENTATION ONLY (OUTPATIENT)
Dept: CARE COORDINATION | Facility: CLINIC | Age: 68
End: 2023-09-19
Payer: MEDICARE

## 2023-09-19 DIAGNOSIS — Z59.71 HAS HEALTH INSURANCE WITH INADEQUATE COVERAGE OF HEALTH EXPENSES: ICD-10-CM

## 2023-09-19 DIAGNOSIS — Z59.9 FINANCIAL DIFFICULTIES: Primary | ICD-10-CM

## 2023-09-19 SDOH — ECONOMIC STABILITY - INCOME SECURITY: PROBLEM RELATED TO HOUSING AND ECONOMIC CIRCUMSTANCES, UNSPECIFIED: Z59.9

## 2023-09-19 ASSESSMENT — ACTIVITIES OF DAILY LIVING (ADL): DEPENDENT_IADLS:: INDEPENDENT

## 2023-09-19 NOTE — PROGRESS NOTES
Clinic Care Coordination Contact  [facility]: Post-Discharge Note  SITUATION                                                      Admission:    Admission Date: 09/15/23   Reason for Admission: Chest pain  Elevated troponin  Nonobstructive coronary artery disease  Discharge:   Discharge Date: 09/16/23  Discharge Diagnosis: Chest pain  Elevated troponin  Nonobstructive coronary artery disease    BACKGROUND                                                      Per hospital discharge summary and inpatient provider notes:      ASSESSMENT           Discharge Assessment  How are you doing now that you are home?: Pt is doing better  How are your symptoms? (Red Flag symptoms escalate to triage hotline per guidelines): Improved  Do you feel your condition is stable enough to be safe at home until your provider visit?: Yes  Does the patient have their discharge instructions? : Yes  Does the patient have questions regarding their discharge instructions? : No  Were you started on any new medications or were there changes to any of your previous medications? : Yes  Does the patient have all of their medications?: Yes  Do you have questions regarding any of your medications? : No  Do you have all of your needed medical supplies or equipment (DME)?  (i.e. oxygen tank, CPAP, cane, etc.): Yes  Discharge follow-up appointment scheduled within 14 calendar days? : Yes  Discharge Follow Up Appointment Date: 09/25/23  Discharge Follow Up Appointment Scheduled with?: Primary Care Provider  Is patient agreeable to assistance with scheduling? : Yes                  PLAN                                                      Outpatient Plan:      Future Appointments   Date Time Provider Department Center   9/25/2023  4:00 PM [facility] [place]         For any urgent concerns, please contact our 24 hour nurse triage line: [phone]       LEIGHA Heredia

## 2023-09-19 NOTE — PROGRESS NOTES
Clinic Care Coordination Contact  Program: Secondary Health Insurance/CC  County:  [facility] Case #:  [facility] Worker:   [place]ette #:   Subscriber #:   Renewal:  Date Applied:     [place] Outreach:   9/19/23:    Health Insurance:      Referral/Screening:

## 2023-09-19 NOTE — CONFIDENTIAL NOTE
Clinic Care Coordination Contact  Care Team Conversations    SW referred patient to [facility] FRW team for help with supplemental insurance issues and to initiate Financial Assistance Application for outstanding medical bills.    ARIADNE ALVARENGA, ALYCIASW, LADC

## 2023-09-19 NOTE — PROGRESS NOTES
Clinic Care Coordination Contact  Program: Secondary Health Insurance/CC  County:  [place] Case #:  [place] Worker:   Danette #:   Subscriber #:   Renewal:  Date Applied:     FRW Outreach:   9/19/23: FRW called pt. Pt received penitentiary each month at $ 750. Appointment for applying for MA and CC on 10/2.  Jacki Costello   Financial Resource Worker  [facility]  Clinic Care Coordination  [phone]     Health Insurance:      Referral/Screening:

## 2023-09-25 ENCOUNTER — OFFICE VISIT (OUTPATIENT)
Dept: FAMILY MEDICINE | Facility: CLINIC | Age: 68
End: 2023-09-25
Payer: MEDICARE

## 2023-09-25 VITALS
BODY MASS INDEX: 38.52 KG/M2 | WEIGHT: 178 LBS | SYSTOLIC BLOOD PRESSURE: 150 MMHG | RESPIRATION RATE: 20 BRPM | DIASTOLIC BLOOD PRESSURE: 83 MMHG

## 2023-09-25 DIAGNOSIS — E03.9 ACQUIRED HYPOTHYROIDISM: ICD-10-CM

## 2023-09-25 DIAGNOSIS — R73.03 PRE-DIABETES: ICD-10-CM

## 2023-09-25 DIAGNOSIS — I10 ESSENTIAL HYPERTENSION: ICD-10-CM

## 2023-09-25 DIAGNOSIS — R07.9 CHEST PAIN, UNSPECIFIED TYPE: ICD-10-CM

## 2023-09-25 DIAGNOSIS — I25.10 CORONARY ARTERY DISEASE INVOLVING NATIVE CORONARY ARTERY OF NATIVE HEART WITHOUT ANGINA PECTORIS: ICD-10-CM

## 2023-09-25 DIAGNOSIS — Z09 HOSPITAL DISCHARGE FOLLOW-UP: Primary | ICD-10-CM

## 2023-09-25 DIAGNOSIS — Z23 NEED FOR PROPHYLACTIC VACCINATION AND INOCULATION AGAINST INFLUENZA: ICD-10-CM

## 2023-09-25 LAB
ANION GAP SERPL CALCULATED.3IONS-SCNC: 8 MMOL/L (ref 3–14)
BUN SERPL-MCNC: 31 MG/DL (ref 7–30)
CALCIUM SERPL-MCNC: 9.9 MG/DL (ref 8.5–10.1)
CHLORIDE BLD-SCNC: 106 MMOL/L (ref 94–109)
CO2 SERPL-SCNC: 29 MMOL/L (ref 20–32)
CREAT SERPL-MCNC: 1.8 MG/DL (ref 0.52–1.04)
CREAT UR-MCNC: 47.6 MG/DL
EGFRCR SERPLBLD CKD-EPI 2021: 31 ML/MIN/1.73M2
GLUCOSE BLD-MCNC: 139 MG/DL (ref 70–99)
MICROALBUMIN UR-MCNC: <12 MG/L
MICROALBUMIN/CREAT UR: NORMAL MG/G{CREAT}
POTASSIUM BLD-SCNC: 4.9 MMOL/L (ref 3.4–5.3)
SODIUM SERPL-SCNC: 143 MMOL/L (ref 133–144)

## 2023-09-25 PROCEDURE — 36415 COLL VENOUS BLD VENIPUNCTURE: CPT

## 2023-09-25 PROCEDURE — 90662 IIV NO PRSV INCREASED AG IM: CPT

## 2023-09-25 PROCEDURE — 81003 URINALYSIS AUTO W/O SCOPE: CPT

## 2023-09-25 PROCEDURE — 80048 BASIC METABOLIC PNL TOTAL CA: CPT

## 2023-09-25 PROCEDURE — G0008 ADMIN INFLUENZA VIRUS VAC: HCPCS

## 2023-09-25 PROCEDURE — 99214 OFFICE O/P EST MOD 30 MIN: CPT | Mod: 25

## 2023-09-25 PROCEDURE — 80061 LIPID PANEL: CPT

## 2023-09-25 PROCEDURE — 82570 ASSAY OF URINE CREATININE: CPT

## 2023-09-25 PROCEDURE — 82043 UR ALBUMIN QUANTITATIVE: CPT

## 2023-09-25 RX ORDER — LISINOPRIL 20 MG/1
20 TABLET ORAL DAILY
Qty: 30 TABLET | Refills: 0 | Status: SHIPPED | OUTPATIENT
Start: 2023-09-25 | End: 2024-06-24

## 2023-09-25 RX ORDER — ATORVASTATIN CALCIUM 40 MG/1
40 TABLET, FILM COATED ORAL EVERY EVENING
Qty: 90 TABLET | Refills: 3 | Status: SHIPPED | OUTPATIENT
Start: 2023-09-25

## 2023-09-25 RX ORDER — LEVOTHYROXINE SODIUM 75 UG/1
75 TABLET ORAL DAILY
Qty: 90 TABLET | Refills: 0 | Status: SHIPPED | OUTPATIENT
Start: 2023-09-25 | End: 2023-12-29

## 2023-09-25 RX ORDER — AMLODIPINE BESYLATE 5 MG/1
5 TABLET ORAL DAILY
Qty: 90 TABLET | Refills: 3 | Status: SHIPPED | OUTPATIENT
Start: 2023-09-25 | End: 2024-06-04

## 2023-09-25 ASSESSMENT — ASTHMA QUESTIONNAIRES
QUESTION_5 LAST FOUR WEEKS HOW WOULD YOU RATE YOUR ASTHMA CONTROL: WELL CONTROLLED
QUESTION_1 LAST FOUR WEEKS HOW MUCH OF THE TIME DID YOUR ASTHMA KEEP YOU FROM GETTING AS MUCH DONE AT WORK, SCHOOL OR AT HOME: A LITTLE OF THE TIME
QUESTION_3 LAST FOUR WEEKS HOW OFTEN DID YOUR ASTHMA SYMPTOMS (WHEEZING, COUGHING, SHORTNESS OF BREATH, CHEST TIGHTNESS OR PAIN) WAKE YOU UP AT NIGHT OR EARLIER THAN USUAL IN THE MORNING: ONCE OR TWICE
QUESTION_2 LAST FOUR WEEKS HOW OFTEN HAVE YOU HAD SHORTNESS OF BREATH: ONCE OR TWICE A WEEK
QUESTION_4 LAST FOUR WEEKS HOW OFTEN HAVE YOU USED YOUR RESCUE INHALER OR NEBULIZER MEDICATION (SUCH AS ALBUTEROL): ONCE A WEEK OR LESS
ACT_TOTALSCORE: 20
ACT_TOTALSCORE: 20

## 2023-09-25 ASSESSMENT — PATIENT HEALTH QUESTIONNAIRE - PHQ9: SUM OF ALL RESPONSES TO PHQ QUESTIONS 1-9: 8

## 2023-09-25 NOTE — PROGRESS NOTES
Assessment & Plan     Hospital discharge follow-up  Coronary artery disease involving native coronary artery of native heart without angina pectoris  Essential hypertension  Patient seen today for hospital follow-up after being admitted with chest pain and found to have non-obstructing coronary artery disease on angiogram, no stents placed. She was started on amlodipine due to elevated blood pressure as well as a statin; she has tolerating both medications, and her blood pressure per home checks seems to be appropriate. However, in clinic today her creatinine has bumped to 1.8; it was 1.0 at her discharge 10 days ago. Will remove the hydrochlorothiazide component of her combination pill and continue the lisinopril, encourage fluid intake, and have the patient follow-up in one week with BMP recheck.   - Adult Cardiology Lyndaal Darlene Referral; Future  - amLODIPine (NORVASC) 5 MG tablet; Take 1 tablet (5 mg) by mouth daily  - lisinopril (ZESTRIL) 20 MG tablet; Take 1 tablet (20 mg) by mouth daily  - atorvastatin (LIPITOR) 40 MG tablet; Take 1 tablet (40 mg) by mouth every evening  - Follow-up in one week with repeat BMP     Acquired hypothyroidism  Patient was established at this clinic three months ago, and at that time her prior levothyroxine dose of 100 mcg was restarted with her TSH at that time at 19. At the hospital, her TSH was low at 0.22, indicating that her levothyroxine dose is too high. Will decrease to 75 mcg daily based on this lab and her weight based dosing, and plan to recheck TSH in 6 weeks.   - levothyroxine (SYNTHROID/LEVOTHROID) 75 MCG tablet; Take 1 tablet (75 mcg) by mouth daily  - Recheck TSH in 6 weeks.     Pre-diabetes  While hospitalized, patient's A1c returned in the pre-diabetic range at 6.0. Discussed the significance of a diagnosis of pre-diabetes, and recommended interventions including increased activity levels throughout the week and dietary modifications. Angi's  has a  diagnosis of diabetes, so she is familiar with the disease and low carb diet. Will recheck A1c in one year.     Need for prophylactic vaccination and inoculation against influenza  Agrees to flu immunization today.      MED REC REQUIRED  Post Medication Reconciliation Status:  Medications reviewed.     Return in about 1 week (around 10/2/2023).    Mary Riddle MD  [facility] [place] Subjective Xia is a 68 year old, presenting for the following health issues:  Hospital follow up  (Follow up )    HPI         9/19/2023    10:09 AM   Post Discharge Outreach   Admission Date 9/15/2023   Reason for Admission Chest pain  Elevated troponin  Nonobstructive coronary artery disease   Discharge Date 9/16/2023   Discharge Diagnosis Chest pain  Elevated troponin  Nonobstructive coronary artery disease   How are you doing now that you are home? Pt is doing better   How are your symptoms? (Red Flag symptoms escalate to triage hotline per guidelines) Improved   Do you feel your condition is stable enough to be safe at home until your provider visit? Yes   Does the patient have their discharge instructions?  Yes   Does the patient have questions regarding their discharge instructions?  No   Were you started on any new medications or were there changes to any of your previous medications?  Yes   Does the patient have all of their medications? Yes   Do you have questions regarding any of your medications?  No   Do you have all of your needed medical supplies or equipment (DME)?  (i.e. oxygen tank, CPAP, cane, etc.) Yes   Discharge follow-up appointment scheduled within 14 calendar days?  Yes   Discharge Follow Up Appointment Date 9/25/2023   Discharge Follow Up Appointment Scheduled with? Primary Care Provider     Hospital Follow-up Visit:    Hospital/Nursing Home/IP Rehab Facility: [facility]  Date of Admission: 9/15/23  Date of Discharge: 9/16/23  Reason(s) for Admission:  Chest pain found to have non-obstructive coronary artery disease    Was your hospitalization related to COVID-19? No   Problems taking medications regularly:  None  Medication changes since discharge:   - Started on atorvastatin 40 mg: Taking regularly   - Started on amlodipine 5 mg: Taking regularly  - Continued lisinopril/hydrochlorothiazide 20-25: Taking regularly   - Checking blood pressure: usually 120's-130's.      Summary of hospitalization:  [facility] discharge summary reviewed. Diagnostic Tests/Treatments reviewed.    Other Healthcare Providers Involved in Patient s Care: Established with cardiology, plans for follow-up in 3 months. Appointment is not yet scheduled   Update since discharge: Continues at home to have some intermittent shortness of breath, happens daily. Lasts for a minute. Occurs with activity. Chest pain has resolved.      Review of Systems   Constitutional, HEENT, cardiovascular, pulmonary, gi and gu systems are negative, except as otherwise noted.      Objective    BP (!) 150/83   Resp 20   Wt 80.7 kg (178 lb)   BMI 38.52 kg/m    Body mass index is 38.52 kg/m .  Physical Exam   GENERAL: healthy, alert and no distress  EYES: Eyes grossly normal to inspection, PERRL and conjunctivae and sclerae normal  RESP: lungs clear to auscultation - no rales, rhonchi or wheezes  CV: regular rate and rhythm, normal S1 S2, no S3 or S4  MS: no gross musculoskeletal defects noted, no edema  PSYCH: mentation appears normal, affect normal/bright

## 2023-09-25 NOTE — PROGRESS NOTES
Faculty Supervision of Residents   I have examined this patient and the medical care has been evaluated and discussed with the resident. See resident note outlining our discussion.    Amena Cherry MD

## 2023-09-25 NOTE — PATIENT INSTRUCTIONS
Stop your combo pill for blood pressure (lisinopril-hydrochlorothiazide)  I will send in a new prescription for just lisinopril  Come back in one week. We will recheck your kidney function again.   I will send in a new prescription for your thyroid at 75 mcg.   I will send refills for your cholesterol medicine and the amlodipine.   I will send a cardiology referral. If they don't call by the end of the week, call them.

## 2023-09-26 LAB
ALBUMIN UR-MCNC: NEGATIVE MG/DL
APPEARANCE UR: CLEAR
BILIRUB UR QL STRIP: NEGATIVE
CHOLEST SERPL-MCNC: 118 MG/DL
COLOR UR AUTO: YELLOW
GLUCOSE UR STRIP-MCNC: NEGATIVE MG/DL
HDLC SERPL-MCNC: 48 MG/DL
HGB UR QL STRIP: ABNORMAL
KETONES UR STRIP-MCNC: NEGATIVE MG/DL
LDLC SERPL CALC-MCNC: 28 MG/DL
LEUKOCYTE ESTERASE UR QL STRIP: ABNORMAL
NITRATE UR QL: NEGATIVE
NONHDLC SERPL-MCNC: 70 MG/DL
PH UR STRIP: 6.5 [PH] (ref 5–7)
SP GR UR STRIP: 1.01 (ref 1–1.03)
TRIGL SERPL-MCNC: 212 MG/DL
UROBILINOGEN UR STRIP-ACNC: 0.2 E.U./DL

## 2023-10-02 ENCOUNTER — APPOINTMENT (OUTPATIENT)
Dept: CARE COORDINATION | Facility: CLINIC | Age: 68
End: 2023-10-02
Payer: MEDICARE

## 2023-10-02 ENCOUNTER — PATIENT OUTREACH (OUTPATIENT)
Dept: CARE COORDINATION | Facility: CLINIC | Age: 68
End: 2023-10-02

## 2023-10-02 NOTE — PROGRESS NOTES
Clinic Care Coordination Contact  Program: Secondary Health Insurance/CC  County:  [facility] Case #:  [facility] Worker:   Danette #:   Subscriber #:   Renewal:  Date Applied:     FRW Outreach:   10/2/23: FRW called pt and we completed the Certain pop application. FRW will mail Certain pop/ AVS form to sign and send to [facility].  Jacki Costello   Financial Resource Worker  [facility] Care Coordination  [phone]   9/19/23: FRW called pt. Pt received penitentiary each month at $ 750. Appointment for applying for MA and CC on 10/2.  Jacki Costello   Financial Resource Worker  [facility]  Clinic Care Coordination  [phone]     Health Insurance:      Referral/Screening:

## 2023-10-05 ENCOUNTER — OFFICE VISIT (OUTPATIENT)
Dept: FAMILY MEDICINE | Facility: CLINIC | Age: 68
End: 2023-10-05
Payer: MEDICARE

## 2023-10-05 VITALS
SYSTOLIC BLOOD PRESSURE: 114 MMHG | WEIGHT: 176 LBS | OXYGEN SATURATION: 99 % | HEART RATE: 63 BPM | DIASTOLIC BLOOD PRESSURE: 72 MMHG | BODY MASS INDEX: 38.09 KG/M2 | RESPIRATION RATE: 16 BRPM

## 2023-10-05 DIAGNOSIS — N17.9 AKI (ACUTE KIDNEY INJURY) (H): Primary | ICD-10-CM

## 2023-10-05 DIAGNOSIS — R31.29 OTHER MICROSCOPIC HEMATURIA: ICD-10-CM

## 2023-10-05 LAB
ALBUMIN UR-MCNC: NEGATIVE MG/DL
ANION GAP SERPL CALCULATED.3IONS-SCNC: 10 MMOL/L (ref 3–14)
APPEARANCE UR: CLEAR
BACTERIA #/AREA URNS HPF: ABNORMAL /HPF
BILIRUB UR QL STRIP: NEGATIVE
BUN SERPL-MCNC: 24 MG/DL (ref 7–30)
CALCIUM SERPL-MCNC: 9.9 MG/DL (ref 8.5–10.1)
CHLORIDE BLD-SCNC: 109 MMOL/L (ref 94–109)
CO2 SERPL-SCNC: 28 MMOL/L (ref 20–32)
COLOR UR AUTO: NORMAL
CREAT SERPL-MCNC: 1.1 MG/DL (ref 0.52–1.04)
EGFRCR SERPLBLD CKD-EPI 2021: 55 ML/MIN/1.73M2
GLUCOSE BLD-MCNC: 93 MG/DL (ref 70–99)
GLUCOSE UR STRIP-MCNC: NEGATIVE MG/DL
HGB UR QL STRIP: NEGATIVE
KETONES UR STRIP-MCNC: NEGATIVE MG/DL
LEUKOCYTE ESTERASE UR QL STRIP: NEGATIVE
NITRATE UR QL: NEGATIVE
PH UR STRIP: 6.5 [PH] (ref 5–7)
POTASSIUM BLD-SCNC: 4.4 MMOL/L (ref 3.4–5.3)
RBC #/AREA URNS AUTO: ABNORMAL /HPF
SODIUM SERPL-SCNC: 147 MMOL/L (ref 133–144)
SP GR UR STRIP: 1.01 (ref 1–1.03)
SQUAMOUS #/AREA URNS AUTO: ABNORMAL /LPF
UROBILINOGEN UR STRIP-ACNC: 0.2 E.U./DL
WBC #/AREA URNS AUTO: ABNORMAL /HPF
WBC CLUMPS #/AREA URNS HPF: ABNORMAL /HPF

## 2023-10-05 PROCEDURE — 80048 BASIC METABOLIC PNL TOTAL CA: CPT

## 2023-10-05 PROCEDURE — 99213 OFFICE O/P EST LOW 20 MIN: CPT | Mod: GC

## 2023-10-05 PROCEDURE — 36415 COLL VENOUS BLD VENIPUNCTURE: CPT

## 2023-10-05 PROCEDURE — 81001 URINALYSIS AUTO W/SCOPE: CPT

## 2023-10-05 NOTE — PROGRESS NOTES
Preceptor Attestation:   Patient seen, evaluated and discussed with the resident. I have verified the content of the note, which accurately reflects my assessment of the patient and the plan of care.    Supervising Physician:Mercedes Yadav MD    [facility]

## 2023-10-05 NOTE — PROGRESS NOTES
Assessment & Plan     MESFIN (acute kidney injury)   In hospital follow-up last week, patient's creatinine was noted to be elevated to 1.8, up from baseline of 1.0-1.1. At that time we stopped hydrochlorothiazide and continued lisinopril. Patient's blood pressure has remained stable at home without the hydrochlorothiazide component, and her creatinine has improved to 1.1 today. Will continue lisinopril without change today, no further testing needed.     Other microscopic hematuria  Patient noted to have trace blood on UA performed at last appointment. She denies any dysuria or gross hematuria, notes that she has not had vaginal bleeding for the past twenty years. Repeat UA performed today shows no signs of RBC. No further work-up needed at this time.     Follow-up in five weeks for TSH check.     Mary Riddle MD  [facility] Mancuso is a 68 year old, presenting for the following health issues:  Recheck Medication    HPI     MESFIN follow-up  - She was seen for hospital follow-up on 9/25/23 after being admitted with chest pain and found to have non-obstructing coronary artery disease on angiogram, no stents placed. She was started on amlodipine due to elevated blood pressure as well as a statin and tolerated both well. In clinic her creatinine bumped to 1.8; it was 1.0 at her discharge 10 days prior. Removed the hydrochlorothiazide component of her combination pill and continued the lisinopril, encourage fluid intake  - Stopped hydrochlorothiazide: Stopped the medication without issue   - Blood pressure readings at home:    - 133/86   - 108/66   - 121/78   - 128/81   - 121/102   - 130/84  - Dysuria: No   - Hematuria: No   - Menopause occurred 20 years ago  - No vaginal bleeding    Review of Systems   Constitutional, HEENT, cardiovascular, pulmonary, gi and gu systems are negative, except as otherwise noted.      Objective    /72   Pulse 63   Resp 16   Wt 79.8 kg (176  lb)   SpO2 99%   BMI 38.09 kg/m    Body mass index is 38.09 kg/m .  Physical Exam     GENERAL: Healthy, alert and no distress  EYES: Eyes grossly normal to inspection.  No discharge or erythema, or obvious scleral/conjunctival abnormalities.  RESP: No audible wheeze, cough, or visible cyanosis.  No visible retractions or increased work of breathing.    SKIN: Visible skin clear. No significant rash, abnormal pigmentation or lesions.  NEURO: Cranial nerves grossly intact.  Mentation and speech appropriate for age.  PSYCH: Mentation appears normal, affect normal/bright, judgement and insight intact, normal speech and appearance well-groomed.

## 2023-10-25 ENCOUNTER — APPOINTMENT (OUTPATIENT)
Dept: INTERPRETER SERVICES | Facility: CLINIC | Age: 68
End: 2023-10-25
Payer: MEDICARE

## 2023-10-25 ENCOUNTER — PATIENT OUTREACH (OUTPATIENT)
Dept: CARE COORDINATION | Facility: CLINIC | Age: 68
End: 2023-10-25
Payer: MEDICARE

## 2023-10-25 NOTE — PROGRESS NOTES
Clinic Care Coordination Contact  Program: Secondary Health Insurance/CC  County:  [facility] Case #:  [facility] Worker:   Danette #:   Subscriber #:   Renewal:  Date Applied:     FRW Outreach:   10/25/23: FRW called pt. Pt received application, [facility] FRW and pt will connect in 2 weeks to see what [facility].   Jacki Costello   Financial Resource Worker  [facility] Care Coordination  [phone]   10/2/23: FRW called pt and we completed the Certain pop application. FRW will mail Certain pop/ AVS form to sign and send to [facility].  Jacki Costello   Financial Resource Worker  [facility]  Clinic Care Coordination  [phone]   9/19/23: FRW called pt. Pt received jail each month at $ 750. Appointment for applying for MA and CC on 10/2.  Jacki Costello   Financial Resource Worker  [facility]  Clinic Care Coordination  [phone]     Health Insurance:      Referral/Screening:

## 2023-11-06 ENCOUNTER — OFFICE VISIT (OUTPATIENT)
Dept: FAMILY MEDICINE | Facility: CLINIC | Age: 68
End: 2023-11-06
Payer: MEDICARE

## 2023-11-06 VITALS
BODY MASS INDEX: 36.89 KG/M2 | HEIGHT: 58 IN | WEIGHT: 175.75 LBS | RESPIRATION RATE: 16 BRPM | DIASTOLIC BLOOD PRESSURE: 65 MMHG | OXYGEN SATURATION: 97 % | HEART RATE: 79 BPM | SYSTOLIC BLOOD PRESSURE: 104 MMHG | TEMPERATURE: 98.1 F

## 2023-11-06 DIAGNOSIS — E03.9 ACQUIRED HYPOTHYROIDISM: Primary | ICD-10-CM

## 2023-11-06 DIAGNOSIS — R05.2 SUBACUTE COUGH: ICD-10-CM

## 2023-11-06 LAB
T4 FREE SERPL-MCNC: 1.37 NG/DL (ref 0.9–1.7)
TSH SERPL DL<=0.005 MIU/L-ACNC: 4.93 UIU/ML (ref 0.3–4.2)

## 2023-11-06 PROCEDURE — 84443 ASSAY THYROID STIM HORMONE: CPT

## 2023-11-06 PROCEDURE — 84439 ASSAY OF FREE THYROXINE: CPT

## 2023-11-06 PROCEDURE — 99214 OFFICE O/P EST MOD 30 MIN: CPT | Mod: GC

## 2023-11-06 PROCEDURE — 36415 COLL VENOUS BLD VENIPUNCTURE: CPT

## 2023-11-06 RX ORDER — RESPIRATORY SYNCYTIAL VIRUS VACCINE 120MCG/0.5
0.5 KIT INTRAMUSCULAR ONCE
Qty: 1 EACH | Refills: 0 | Status: CANCELLED | OUTPATIENT
Start: 2023-11-06 | End: 2023-11-06

## 2023-11-06 RX ORDER — BENZONATATE 100 MG/1
100 CAPSULE ORAL 3 TIMES DAILY PRN
Qty: 90 CAPSULE | Refills: 0 | Status: SHIPPED | OUTPATIENT
Start: 2023-11-06 | End: 2024-06-04

## 2023-11-06 RX ORDER — LISINOPRIL 20 MG/1
20 TABLET ORAL DAILY
Qty: 30 TABLET | Refills: 0 | Status: CANCELLED | OUTPATIENT
Start: 2023-11-06

## 2023-11-06 NOTE — PROGRESS NOTES
Assessment & Plan     Acquired hypothyroidism  Patient presented today for TSH recheck after dose modification of her levothyroxine six weeks ago; decreased from 100 mcg to 75 mcg. Her TSH today is improved today, now at 4. She is tolerating the current dose of her medication well.   - Will continue at the 75 mcg dose of levothyroxine    Subacute cough  Patient presents with about three weeks of dry cough, no other respiratory symptoms. She has a history of asthma and takes Singulair and albuterol PRN. Lungs sound clear on exam today; possible that this is a viral infection causing an mild asthma flare. However, she was also restarted on lisinopril about three weeks ago; concerned that she is experiencing the dry cough associated with lisinopril  - Hold lisinopril and check blood pressure at home with return visit in two weeks   - If symptoms improve at next visit but blood pressure is high - start losartan instead  - If symptoms are not improved at next visit - consider initiating controller inhaler for asthma   - benzonatate (TESSALON) 100 MG capsule; Take 1 capsule (100 mg) by mouth 3 times daily as needed for cough    Return in about 2 weeks (around 11/20/2023).    Mary Riddle MD  [facility] [place] Subjective Xia is a 68 year old, presenting for the following health issues:  Kidney Problem (Follow-up) and Cough (3 weeks, OTC medication is not working/)      11/6/2023     2:58 PM   Additional Questions   Roomed by Rosa RICHARDSON     Hypothyroidism follow-up  Patient was established at this clinic four months ago, and at that time her prior levothyroxine dose of 100 mcg was restarted with her TSH at that time at 19. At the hospital in September her TSH was low at 0.22, indicating that her levothyroxine dose was too high. Decreased to 75 mcg daily based on this lab and her weight based dosing with plan to recheck TSH today.   - Taking levothyroxine regularly: Yes  - Issues with  "the medication: No issues       Acute Illness  Acute illness concerns: Cough   Onset/Duration: Three weeks ago   Symptoms:  Fever: No  Chills/Sweats: No  Headache (location?): No  Sinus Pressure: No  Conjunctivitis:  No  Ear Pain: no  Rhinorrhea: No  Congestion: No  Sore Throat: No  Cough: YES; dry cough   Wheeze: No  Decreased Appetite: No  Nausea: No  Vomiting: No  Diarrhea: No  Sick/Strep Exposure: No  Therapies tried and outcome: NyQuil, cough drops are not helpful.    - Has a history of asthma   - Has been taking montelukast for seasonal allergies  - Has albuterol, has been using three times a day. Helps for the short term. Only uses it when she is sick.   - Has not been on any other inhalers other than albuterol      Review of Systems   Positive: cough  Negative: wheeze, shortness of breath, fever      Objective    /65 (BP Location: Right arm, Cuff Size: Adult Regular)   Pulse 79   Temp 98.1  F (36.7  C) (Oral)   Resp 16   Ht 1.475 m (4' 10.07\")   Wt 79.7 kg (175 lb 12 oz)   LMP  (LMP Unknown)   SpO2 97%   BMI 36.64 kg/m    Body mass index is 36.64 kg/m .  Physical Exam     GENERAL: healthy, alert and no distress  EYES: Eyes grossly normal to inspection, PERRL and conjunctivae and sclerae normal  NECK: no adenopathy, no asymmetry, masses, or scars and thyroid normal to palpation  RESP: lungs clear to auscultation - no rales, rhonchi or wheezes  CV: regular rate and rhythm  PSYCH: mentation appears normal, affect normal/bright            "

## 2023-11-06 NOTE — LETTER
November 10, 2023      Jamee Muhammad  [address]  [place] [address]        Dear MsJamesJb,    We are writing to inform you of your test results.    Thanks for coming in for a thyroid recheck! Your thyroid levels look better! I do not think we need to make any additional medication changes at this time. Please let us know if you have any questions.       Resulted Orders   TSH with free T4 reflex   Result Value Ref Range    TSH 4.93 (H) 0.30 - 4.20 uIU/mL   T4 free   Result Value Ref Range    Free T4 1.37 0.90 - 1.70 ng/dL       If you have any questions or concerns, please call the clinic at the number listed above.       Sincerely,      Amena Cherry MD

## 2023-11-06 NOTE — PATIENT INSTRUCTIONS
We are rechecking your thyroid function today  I am sorry about your cough!   I am worried that your lisinopril is playing a role in your cough. Stop taking your lisinopril. Please check your blood pressure once a day for the next two weeks. If if it gets high, I will start a different medication at your next visit .  I will send over tessalon pearls to help dull your cough.  If these things don't help when you come back in two weeks, it might be due to your asthma.

## 2023-11-09 ENCOUNTER — PATIENT OUTREACH (OUTPATIENT)
Dept: CARE COORDINATION | Facility: CLINIC | Age: 68
End: 2023-11-09
Payer: MEDICARE

## 2023-11-09 NOTE — PROGRESS NOTES
Clinic Care Coordination Contact  Program: Secondary Health Insurance/CC  County:  [facility] Case #:  [facility] Worker:   Danette #:   Subscriber #:   Renewal:  Date Applied:     FRW Outreach:   11/9/23: FRW called pt. Pt has not heard back from [facility] yet. FRW and pt connecting in 2 additional weeks.   Jacki Costello   Financial Resource Worker  [facility] Care Coordination  [phone]   10/25/23: FRW called pt. Pt received application, signed and sent back to [facility]. FRW and pt will connect in 2 weeks to see what [facility] has processed.   Jacki Costello   Financial Resource Worker  [facility] Care Coordination  [phone]   10/2/23: FRW called pt and we completed the Certain pop application. FRW will mail Certain pop/ AVS form to sign and send to [facility].  Jacki Costello   Financial Resource Worker  [facility]  Clinic Care Coordination  [phone]   9/19/23: FRW called pt. Pt received long term each month at $ 750. Appointment for applying for MA and CC on 10/2.  Jacki Costello   Financial Resource Worker  [facility]  Clinic Care Coordination  [phone]     Health Insurance:      Referral/Screening:

## 2023-12-05 ENCOUNTER — PATIENT OUTREACH (OUTPATIENT)
Dept: CARE COORDINATION | Facility: CLINIC | Age: 68
End: 2023-12-05
Payer: MEDICARE

## 2023-12-05 NOTE — PROGRESS NOTES
Clinic Care Coordination Contact  Program: Secondary Health Insurance/CC  County:  [facility] Case #:  [facility] Worker:   Danette #:   Subscriber #:   Renewal:  Date Applied:     FRW Outreach:   12/5/23: FRW called pt but unable to leave . FRW will make another outreach within 30 days.   Jacki Costello   Financial Resource Worker  [facility] Care Coordination  [phone]   11/9/23: FRW called pt. Pt has not heard back from [facility] yet. FRW and pt connecting in 2 additional weeks.   Jacki Costello   Financial Resource Worker  [facility] Care Coordination  [phone]   10/25/23: FRW called pt. Pt received application, signed and sent back to [facility]. FRW and pt will connect in 2 weeks to see what [facility] has processed.   Jacki Costello   Financial Resource Worker  [facility] Care Coordination  [phone]   10/2/23: FRW called pt and we completed the Certain pop application. FRW will mail Certain pop/ AVS form to sign and send to [facility].  Jacki Costello   Financial Resource Worker  [facility] Care Coordination  [phone]   9/19/23: FRW called pt. Pt received correction each month at $ 750. Appointment for applying for MA and CC on 10/2.  Jacki Costello   Financial Resource Worker  [facility]  Clinic Care Coordination  [phone]     Health Insurance:      Referral/Screening:

## 2023-12-12 ENCOUNTER — PATIENT OUTREACH (OUTPATIENT)
Dept: CARE COORDINATION | Facility: CLINIC | Age: 68
End: 2023-12-12
Payer: MEDICARE

## 2023-12-12 NOTE — PROGRESS NOTES
Clinic Care Coordination Contact  Program: Secondary Health Insurance/CC  County:  [facility] Case #:  [facility] Worker:   Danette #:   Subscriber #:   Renewal:  Date Applied:     FRW Outreach:   12/12/23: FRW [place], [place]. Pts MA is added in EPIC and patient has 0.00 balance. No further FRW needs.   Jacki Costello   Financial Resource Worker  [facility] Care Coordination  [phone]   12/5/23: FRW called pt but unable to leave . FRW will make another outreach within 30 days.   Jacki Costello   Financial Resource Worker  [facility] Care Coordination  [phone]   11/9/23: FRW called pt. Pt has not heard back from [facility] yet. FRW and pt connecting in 2 additional weeks.   Jacki Costello   Financial Resource Worker  [facility] Care Coordination  [phone]   10/25/23: FRW called pt. Pt received application, signed and sent back to [facility]. FRW and pt will connect in 2 weeks to see what [facility] has processed.   Jacki Costello   Financial Resource Worker  [facility] Care Coordination  [phone]   10/2/23: FRW called pt and we completed the Certain pop application. FRW will mail Certain pop/ AVS form to sign and send to [facility].  Jacki Costello   Financial Resource Worker  [facility] Care Coordination  [phone]   9/19/23: FRW called pt. Pt received shelter each month at $ 750. Appointment for applying for MA and CC on 10/2.  Jacki Costello   Financial Resource Worker  [facility] Care Coordination  [phone]     Health Insurance:      Referral/Screening:

## 2023-12-29 DIAGNOSIS — E03.9 ACQUIRED HYPOTHYROIDISM: ICD-10-CM

## 2023-12-29 NOTE — TELEPHONE ENCOUNTER
Message to physician:     Date of last visit: 11/6/2023    Date of next visit if scheduled:     Potassium   Date Value Ref Range Status   10/05/2023 4.4 3.4 - 5.3 mmol/L Final     Creatinine   Date Value Ref Range Status   10/05/2023 1.10 (H) 0.52 - 1.04 mg/dL Final     GFR Estimate   Date Value Ref Range Status   10/05/2023 55 (L) >60 mL/min/1.73m2 Final       BP Readings from Last 3 Encounters:   11/06/23 104/65   10/05/23 114/72   09/25/23 (!) 150/83       Hemoglobin A1C   Date Value Ref Range Status   09/15/2023 6.0 (H) <5.7 % Final     Comment:     Normal <5.7%   Prediabetes 5.7-6.4%    Diabetes 6.5% or higher     Note: Adopted from ADA consensus guidelines.       Please complete refill and CLOSE ENCOUNTER.  Closing the encounter signifies the refill is complete.     No

## 2024-01-01 RX ORDER — LEVOTHYROXINE SODIUM 75 UG/1
75 TABLET ORAL DAILY
Qty: 90 TABLET | Refills: 3 | Status: SHIPPED | OUTPATIENT
Start: 2024-01-01

## 2024-01-19 ENCOUNTER — APPOINTMENT (OUTPATIENT)
Dept: INTERPRETER SERVICES | Facility: CLINIC | Age: 69
End: 2024-01-19
Payer: COMMERCIAL

## 2024-01-25 ENCOUNTER — OFFICE VISIT (OUTPATIENT)
Dept: FAMILY MEDICINE | Facility: CLINIC | Age: 69
End: 2024-01-25
Payer: COMMERCIAL

## 2024-01-25 VITALS
DIASTOLIC BLOOD PRESSURE: 82 MMHG | OXYGEN SATURATION: 97 % | SYSTOLIC BLOOD PRESSURE: 148 MMHG | BODY MASS INDEX: 38.62 KG/M2 | RESPIRATION RATE: 21 BRPM | HEIGHT: 58 IN | WEIGHT: 184 LBS | HEART RATE: 71 BPM

## 2024-01-25 DIAGNOSIS — Z02.9 ADMINISTRATIVE ENCOUNTER: ICD-10-CM

## 2024-01-25 DIAGNOSIS — S91.332A PENETRATING FOOT WOUND, LEFT, INITIAL ENCOUNTER: Primary | ICD-10-CM

## 2024-01-25 LAB
ALBUMIN UR-MCNC: NEGATIVE MG/DL
APPEARANCE UR: CLEAR
BILIRUB UR QL STRIP: NEGATIVE
COLOR UR AUTO: ABNORMAL
GLUCOSE UR STRIP-MCNC: NEGATIVE MG/DL
HGB UR QL STRIP: ABNORMAL
KETONES UR STRIP-MCNC: NEGATIVE MG/DL
LEUKOCYTE ESTERASE UR QL STRIP: ABNORMAL
NITRATE UR QL: NEGATIVE
PH UR STRIP: 6 [PH] (ref 5–7)
SP GR UR STRIP: 1.02 (ref 1–1.03)
UROBILINOGEN UR STRIP-ACNC: 0.2 E.U./DL

## 2024-01-25 PROCEDURE — 87086 URINE CULTURE/COLONY COUNT: CPT

## 2024-01-25 PROCEDURE — 81003 URINALYSIS AUTO W/O SCOPE: CPT

## 2024-01-25 PROCEDURE — 99213 OFFICE O/P EST LOW 20 MIN: CPT | Mod: GC

## 2024-01-25 ASSESSMENT — PATIENT HEALTH QUESTIONNAIRE - PHQ9: SUM OF ALL RESPONSES TO PHQ QUESTIONS 1-9: 7

## 2024-01-25 NOTE — PROGRESS NOTES
Faculty Supervision of Residents   I have examined this patient and the medical care has been evaluated and discussed with the resident. See resident note outlining our discussion.    Stepped on a nail, needs updated tetanus.     Amena Cherry MD

## 2024-01-25 NOTE — LETTER
January 25, 2024    RE:  Jamee Muhammad                              [address] [place] [address]            To whom it may concern:    Jamee Muhammad was seen in our clinic on 1/25/2024 for an injury to her left foot. She will need a tetanus shot for infection prophylaxis. Please provide her with this care as we are unable to do so in our clinic.       Sincerely,        Park Ellison MD
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
## 2024-01-27 LAB — BACTERIA UR CULT: NORMAL

## 2024-01-30 ENCOUNTER — TELEPHONE (OUTPATIENT)
Dept: FAMILY MEDICINE | Facility: CLINIC | Age: 69
End: 2024-01-30
Payer: COMMERCIAL

## 2024-01-30 DIAGNOSIS — N30.00 ACUTE CYSTITIS WITHOUT HEMATURIA: Primary | ICD-10-CM

## 2024-01-30 RX ORDER — SULFAMETHOXAZOLE/TRIMETHOPRIM 800-160 MG
1 TABLET ORAL 2 TIMES DAILY
Qty: 6 TABLET | Refills: 0 | Status: SHIPPED | OUTPATIENT
Start: 2024-01-30 | End: 2024-02-02

## 2024-01-30 NOTE — TELEPHONE ENCOUNTER
Writer called patient's daughter who is with patient to discuss urinary symptoms. Patient endorses increased in urinary frequency x1 month. Sometimes burning/pain when voiding. Denies suprapubic pain/pressure/discomfort. Routing to  for review per her request. Samara REYNAGA

## 2024-02-13 ENCOUNTER — TRANSFERRED RECORDS (OUTPATIENT)
Dept: HEALTH INFORMATION MANAGEMENT | Facility: CLINIC | Age: 69
End: 2024-02-13
Payer: COMMERCIAL

## 2024-05-13 ENCOUNTER — OFFICE VISIT (OUTPATIENT)
Dept: FAMILY MEDICINE | Facility: CLINIC | Age: 69
End: 2024-05-13
Payer: COMMERCIAL

## 2024-05-13 VITALS
RESPIRATION RATE: 18 BRPM | HEIGHT: 58 IN | WEIGHT: 183 LBS | BODY MASS INDEX: 38.41 KG/M2 | OXYGEN SATURATION: 98 % | DIASTOLIC BLOOD PRESSURE: 82 MMHG | SYSTOLIC BLOOD PRESSURE: 155 MMHG | HEART RATE: 75 BPM

## 2024-05-13 DIAGNOSIS — I10 ESSENTIAL HYPERTENSION: ICD-10-CM

## 2024-05-13 DIAGNOSIS — L03.012 CELLULITIS OF FINGER OF LEFT HAND: Primary | ICD-10-CM

## 2024-05-13 PROCEDURE — 99214 OFFICE O/P EST MOD 30 MIN: CPT | Mod: GC

## 2024-05-13 RX ORDER — RESPIRATORY SYNCYTIAL VIRUS VACCINE 120MCG/0.5
0.5 KIT INTRAMUSCULAR ONCE
Qty: 1 EACH | Refills: 0 | Status: CANCELLED | OUTPATIENT
Start: 2024-05-13 | End: 2024-05-13

## 2024-05-13 RX ORDER — CEPHALEXIN 500 MG/1
500 CAPSULE ORAL 3 TIMES DAILY
Qty: 21 CAPSULE | Refills: 0 | Status: SHIPPED | OUTPATIENT
Start: 2024-05-13 | End: 2024-06-24

## 2024-05-13 RX ORDER — AMLODIPINE BESYLATE 10 MG/1
10 TABLET ORAL DAILY
Qty: 30 TABLET | Refills: 0 | Status: SHIPPED | OUTPATIENT
Start: 2024-05-13 | End: 2024-06-04

## 2024-05-13 ASSESSMENT — ASTHMA QUESTIONNAIRES
ACT_TOTALSCORE: 25
QUESTION_1 LAST FOUR WEEKS HOW MUCH OF THE TIME DID YOUR ASTHMA KEEP YOU FROM GETTING AS MUCH DONE AT WORK, SCHOOL OR AT HOME: NONE OF THE TIME
QUESTION_2 LAST FOUR WEEKS HOW OFTEN HAVE YOU HAD SHORTNESS OF BREATH: NOT AT ALL

## 2024-05-13 ASSESSMENT — PATIENT HEALTH QUESTIONNAIRE - PHQ9
SUM OF ALL RESPONSES TO PHQ QUESTIONS 1-9: 9
SUM OF ALL RESPONSES TO PHQ QUESTIONS 1-9: 9
10. IF YOU CHECKED OFF ANY PROBLEMS, HOW DIFFICULT HAVE THESE PROBLEMS MADE IT FOR YOU TO DO YOUR WORK, TAKE CARE OF THINGS AT HOME, OR GET ALONG WITH OTHER PEOPLE: VERY DIFFICULT

## 2024-05-13 NOTE — PROGRESS NOTES
Preceptor Attestation:  Patient's case reviewed and discussed with the resident, Mary Riddle MD, and I personally evaluated the patient. I agree with written assessment and plan of care.    Supervising Physician:  Venita Paris MD   [facility]

## 2024-05-13 NOTE — PROGRESS NOTES
Assessment & Plan     Cellulitis of finger of left hand  Patient presents with three weeks of progressive erythema and edema of the left middle finger from the nail line to the PIP joint. Suspect that this started as a paronychia and worsened to cellulitis. No fevers, significant pain. Will treat with oral keflex and have her follow-up by the end of the week to ensure symptoms are improving. A line was drawn around the proximal edge of the infected area; instructed patient to present to the emergency room if the erythema continues to extend despite antibiotic use. If erythema and edema is not improved at follow-up, would consider imaging at that time.  - cephALEXin (KEFLEX) 500 MG capsule  Dispense: 21 capsule; Refill: 0  - Follow-up at the end of the week     Essential hypertension  Patient's blood pressure is elevated today at 155/82, and with her report, seems to be elevated at home as well. She states that she is taking all of her medications, but lisinopril does not appear to have been refilled. Will double her dose of amlodipine and recheck in clinic visit in two weeks. Discussed that she bring all of her medications to that visit as well.   - amLODIPine (NORVASC) 10 MG tablet  Dispense: 30 tablet; Refill: 0    Return in about 4 days (around 5/17/2024).    Destiny Mancuso is a 69 year old, presenting for the following health issues:  Recheck Medication (Discuss medications), Musculoskeletal Problem (Leg and Hand Pain, arthritis), and Infection (Middle Finger in L hand, believes its infected)        5/13/2024     2:57 PM   Additional Questions   Roomed by Ety   Accompanied by Self         5/13/2024    Information    services provided? Yes   Language Hmong   Type of interpretation provided Face-to-face    name Benjie Julia    Agency [employer]     HPI     Medication concerns  - Just had her medications refilled without issue  - Reports taking her blood pressure  "medications as prescribed.  - Blood pressure at home: 145-160  - Unclear if she is taking her lisinopril as the refill history was only for thirty days in September    Middle finger pain, left hand  - Three weeks ago she noted redness and swelling  - Injury: No injury   - No bug bite or cut  - Fevers: No   - Warmth: Yes  - Redness spread down the finger initially, has not been worsening.   - No allergies to antibiotics     Review of Systems  Constitutional, HEENT, cardiovascular, pulmonary, gi and gu systems are negative, except as otherwise noted.      Objective    BP (!) 155/82 (BP Location: Right arm, Patient Position: Sitting, Cuff Size: Adult Large)   Pulse 75   Resp 18   Ht 1.465 m (4' 9.68\")   Wt 83 kg (183 lb)   SpO2 98%   BMI 38.68 kg/m    Body mass index is 38.68 kg/m .  Physical Exam     GENERAL: Healthy, alert and no distress  EYES: Eyes grossly normal to inspection.  No discharge or erythema, or obvious scleral/conjunctival abnormalities.  RESP: No audible wheeze, cough, or visible cyanosis.  No visible retractions or increased work of breathing.    SKIN: Circumferential edema and erythema of middle finger of left hand to PIP joint line.   NEURO: Cranial nerves grossly intact.  Mentation and speech appropriate for age.  PSYCH: Mentation appears normal, affect normal/bright, judgement and insight intact, normal speech and appearance well-groomed      Signed Electronically by: Mary Riddle MD  "

## 2024-05-13 NOTE — PATIENT INSTRUCTIONS
We think you have cellulitis, or skin infection of your middle finger. I will send over keflex three times daily for one week.   Please come back to clinic on Friday to recheck your finger  If the redness worsens before that time, go to the emergency room  We are increasing your amlodipine from 5 mg to 10 mg. Take two of the pills you already have, then transition to the new prescription.  Write down your blood pressures that you check at home and bring them to your next appointment in two weeks.   Bring all of your medications to the visit in two weeks

## 2024-05-16 NOTE — PROGRESS NOTES
"  Assessment & Plan     (L03.012) Cellulitis of finger of left hand  (primary encounter diagnosis)  Comment: Improving with Keflex regimen (500mg TID x 7 days, about 3 days into her regimen currently). Up to date on tetanus shot. Recommend close monitoring, already has a follow-up coming up with Dr. Riddle, plan to follow up at that time. Low threshold to add on additional antibiotics if not cleared.  Plan: follow-up in 1 week    (L30.1) Dyshidrotic eczema  Comment: Blistering consistent with dyshidrotic eczema, though with overlying cellulitis, will hold off on treatment with topical steroid. Patient currently only hydrating with Vaseline, expect this to improve as infection improves. Low threshold to start topical steroid cream if still worsening itching after infection clears.   Plan: follow-up in 1 week      BMI  Estimated body mass index is 39.41 kg/m  as calculated from the following:    Height as of this encounter: 1.448 m (4' 9\").    Weight as of this encounter: 82.6 kg (182 lb 1.9 oz).   Weight management plan: Patient was referred to their PCP to discuss a diet and exercise plan.        Follow up in 1 week      Destiny Mancuso is a 69 year old, presenting for the following health issues:  RECHECK (Recheck Finger pt states doing better. Not spreading but swollen, no pain just itching. )    HPI     Here for follow-up on cellulitis  Per Dr. Riddle's note 5/13/2024:  Cellulitis of finger of left hand  Patient presents with three weeks of progressive erythema and edema of the left middle finger from the nail line to the PIP joint. Suspect that this started as a paronychia and worsened to cellulitis. No fevers, significant pain. Will treat with oral keflex and have her follow-up by the end of the week to ensure symptoms are improving. A line was drawn around the proximal edge of the infected area; instructed patient to present to the emergency room if the erythema continues to extend despite antibiotic " "use. If erythema and edema is not improved at follow-up, would consider imaging at that time.  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      Objective    /73 (BP Location: Right arm, Patient Position: Sitting, Cuff Size: Adult Regular)   Pulse 85   Resp 18   Ht 1.448 m (4' 9\")   Wt 82.6 kg (182 lb 1.9 oz)   SpO2 96%   BMI 39.41 kg/m    Body mass index is 39.41 kg/m .    Physical Exam   General: Alert, no acute distress  Skin: clean, dry, and intact  HEENT: normocephalic, atraumatic, spontaneous eye movement, no injection or icterus, ears and nose normal, no neck masses  Resp: normal work of breathing, no wheezing  Cardio: RRR, S1 and S2 present  Psych: normal mood, normal affect  L middle finger: Significant erythema, edema, and exudate of the 3rd left middle finger only extending to the DIP joint and without extension beyond the drawn carloz from last visit. Only mildly tender to palpation, improved. Full ROM and strength. See photo below.          Signed Electronically by: Gabby Hernandez MD  Precepted patient with Dr. Lady Doherty.    "

## 2024-05-17 ENCOUNTER — OFFICE VISIT (OUTPATIENT)
Dept: FAMILY MEDICINE | Facility: CLINIC | Age: 69
End: 2024-05-17
Payer: COMMERCIAL

## 2024-05-17 VITALS
BODY MASS INDEX: 39.29 KG/M2 | HEIGHT: 57 IN | OXYGEN SATURATION: 96 % | HEART RATE: 85 BPM | SYSTOLIC BLOOD PRESSURE: 133 MMHG | WEIGHT: 182.12 LBS | DIASTOLIC BLOOD PRESSURE: 73 MMHG | RESPIRATION RATE: 18 BRPM

## 2024-05-17 DIAGNOSIS — L30.1 DYSHIDROTIC ECZEMA: ICD-10-CM

## 2024-05-17 DIAGNOSIS — L03.012 CELLULITIS OF FINGER OF LEFT HAND: Primary | ICD-10-CM

## 2024-05-17 PROCEDURE — 99213 OFFICE O/P EST LOW 20 MIN: CPT | Mod: GC

## 2024-05-17 RX ORDER — RESPIRATORY SYNCYTIAL VIRUS VACCINE 120MCG/0.5
0.5 KIT INTRAMUSCULAR ONCE
Qty: 1 EACH | Refills: 0 | Status: CANCELLED | OUTPATIENT
Start: 2024-05-17 | End: 2024-05-17

## 2024-06-03 ENCOUNTER — OFFICE VISIT (OUTPATIENT)
Dept: FAMILY MEDICINE | Facility: CLINIC | Age: 69
End: 2024-06-03
Payer: COMMERCIAL

## 2024-06-03 VITALS
TEMPERATURE: 97.8 F | SYSTOLIC BLOOD PRESSURE: 132 MMHG | WEIGHT: 182 LBS | HEART RATE: 87 BPM | BODY MASS INDEX: 39.26 KG/M2 | HEIGHT: 57 IN | RESPIRATION RATE: 22 BRPM | DIASTOLIC BLOOD PRESSURE: 70 MMHG | OXYGEN SATURATION: 98 %

## 2024-06-03 DIAGNOSIS — I10 ESSENTIAL HYPERTENSION: ICD-10-CM

## 2024-06-03 DIAGNOSIS — M53.3 SI (SACROILIAC) JOINT DYSFUNCTION: Primary | ICD-10-CM

## 2024-06-03 PROCEDURE — 99214 OFFICE O/P EST MOD 30 MIN: CPT | Mod: GC

## 2024-06-03 PROCEDURE — G2211 COMPLEX E/M VISIT ADD ON: HCPCS

## 2024-06-03 RX ORDER — RESPIRATORY SYNCYTIAL VIRUS VACCINE 120MCG/0.5
0.5 KIT INTRAMUSCULAR ONCE
Qty: 1 EACH | Refills: 0 | Status: CANCELLED | OUTPATIENT
Start: 2024-06-03 | End: 2024-06-03

## 2024-06-03 NOTE — PROGRESS NOTES
Assessment & Plan     SI (sacroiliac) joint dysfunction  Patient notes chronic hip pain for the last ten years, previously diagnosed with arthritis. However, one month ago, she developed worsening pain in bilateral buttock with bilateral radiculopathy more consistent with SI joint dysfunction and sciatica. Will trial naproxen for 10 days (reduced dose with kidney function) and if this does not improve radiculopathy, will start gabapentin at bedtime after that. Will also refer to PT; she trial PT ten years ago for her hip arthritis, but discussed with this being a new issue, worth repeating.   - naproxen (NAPROSYN) 500 MG tablet; Take 1 tablet (500 mg) by mouth daily  - gabapentin (NEURONTIN) 300 MG capsule; Take 1 capsule (300 mg) by mouth at bedtime  - acetaminophen (TYLENOL) 500 MG tablet; Take 1-2 tablets (500-1,000 mg) by mouth every 6 hours as needed for mild pain  - Physical therapy referral     Essential hypertension  Blood pressure at goal today after increasing amlodipine from 5 to 10 mg. Will refill amlodipine and continue with current regimen.   - amLODIPine (NORVASC) 10 MG tablet; Take 1 tablet (10 mg) by mouth daily    Return in about 3 weeks (around 6/24/2024).    Subjective   Jamee is a 69 year old, presenting for the following health issues:  RECHECK (BP, hip and finger)        6/3/2024    11:01 AM   Additional Questions   Roomed by Jm   Accompanied by MIRNA         6/3/2024    Information    services provided? Yes   Language Hmong   Type of interpretation provided Face-to-face    name Steve Moody    ID MIRNA    [employer]    phone number [phone]     HPI     Hip pain  - When did it start? Over ten years, worsened a month ago   - Previous imaging in 2019 (x-ray): showed slight symmetric central degenerative change in both hips. Otherwise negative.   - Where does it hurt?   Both hips, equally. Center of bilateral buttocks.   -  "Radiculopathy: both legs, radiates down to knees.   - Describe the pain? Pain with numbness   - Did you have an injury? No history of injury one month ago   - Any skin changes, swelling or erythema?  No  - Does anything make the pain worse?   She is struggling to walk a block.   - What are you using to treat the pain?   Tylenol, once per day.   - Does this injury affect your work/ability to complete daily tasks?   Affects mobility     Blood pressure recheck  - Blood pressure was elevated to 150's/80's at last visit  - Increased dose of amlodipine from 5 mg to 10 mg at that time  - No side effects or intolerance of amlodipine    Review of Systems  Constitutional, HEENT, cardiovascular, pulmonary, gi and gu systems are negative, except as otherwise noted.      Objective    /70   Pulse 87   Temp 97.8  F (36.6  C)   Resp 22   Ht 1.448 m (4' 9\")   Wt 82.6 kg (182 lb)   SpO2 98%   BMI 39.38 kg/m    Body mass index is 39.38 kg/m .  Physical Exam     GENERAL: Healthy, alert and no distress  EYES: Eyes grossly normal to inspection.  No discharge or erythema, or obvious scleral/conjunctival abnormalities.  RESP: No audible wheeze, cough, or visible cyanosis.  No visible retractions or increased work of breathing.    SKIN: Visible skin clear. No significant rash, abnormal pigmentation or lesions.  NEURO: Cranial nerves grossly intact.  Mentation and speech appropriate for age.  PSYCH: Mentation appears normal, affect normal/bright, judgement and insight intact, normal speech and appearance well-groomed.  MSK: Pain noted with palpation to piriformis.         Signed Electronically by: Mary Riddle MD    "

## 2024-06-04 RX ORDER — ACETAMINOPHEN 500 MG
500-1000 TABLET ORAL EVERY 6 HOURS PRN
Qty: 200 TABLET | Refills: 3 | Status: SHIPPED | OUTPATIENT
Start: 2024-06-04

## 2024-06-04 RX ORDER — AMLODIPINE BESYLATE 10 MG/1
10 TABLET ORAL DAILY
Qty: 90 TABLET | Refills: 3 | Status: SHIPPED | OUTPATIENT
Start: 2024-06-04 | End: 2024-06-24

## 2024-06-04 RX ORDER — GABAPENTIN 300 MG/1
300 CAPSULE ORAL AT BEDTIME
Qty: 30 CAPSULE | Refills: 0 | Status: SHIPPED | OUTPATIENT
Start: 2024-06-04 | End: 2024-06-27

## 2024-06-04 RX ORDER — NAPROXEN 500 MG/1
500 TABLET ORAL DAILY
Qty: 10 TABLET | Refills: 0 | Status: SHIPPED | OUTPATIENT
Start: 2024-06-04 | End: 2024-06-27

## 2024-06-04 NOTE — PROGRESS NOTES
Preceptor Attestation:   Patient seen, evaluated and discussed with the resident. I have verified the content of the note, which accurately reflects my assessment of the patient and the plan of care.    The longitudinal plan of care for the diagnosis(es)/condition(s) as documented were addressed during this visit. Due to the added complexity in care, I will continue to support Jamee in the subsequent management and with ongoing continuity of care.      Supervising Physician:Don Zamora MD    [facility]

## 2024-06-05 ENCOUNTER — APPOINTMENT (OUTPATIENT)
Dept: INTERPRETER SERVICES | Facility: CLINIC | Age: 69
End: 2024-06-05
Payer: COMMERCIAL

## 2024-06-24 ENCOUNTER — OFFICE VISIT (OUTPATIENT)
Dept: FAMILY MEDICINE | Facility: CLINIC | Age: 69
End: 2024-06-24
Payer: COMMERCIAL

## 2024-06-24 VITALS
SYSTOLIC BLOOD PRESSURE: 142 MMHG | TEMPERATURE: 98.1 F | HEIGHT: 58 IN | RESPIRATION RATE: 20 BRPM | OXYGEN SATURATION: 96 % | WEIGHT: 186.08 LBS | BODY MASS INDEX: 39.06 KG/M2 | HEART RATE: 80 BPM | DIASTOLIC BLOOD PRESSURE: 83 MMHG

## 2024-06-24 DIAGNOSIS — I10 ESSENTIAL HYPERTENSION: Primary | ICD-10-CM

## 2024-06-24 DIAGNOSIS — M53.3 SI (SACROILIAC) JOINT DYSFUNCTION: ICD-10-CM

## 2024-06-24 PROBLEM — E66.812 CLASS 2 SEVERE OBESITY DUE TO EXCESS CALORIES WITH SERIOUS COMORBIDITY IN ADULT (H): Status: ACTIVE | Noted: 2024-06-24

## 2024-06-24 PROBLEM — E66.01 CLASS 2 SEVERE OBESITY DUE TO EXCESS CALORIES WITH SERIOUS COMORBIDITY IN ADULT (H): Status: ACTIVE | Noted: 2024-06-24

## 2024-06-24 PROCEDURE — 99214 OFFICE O/P EST MOD 30 MIN: CPT | Mod: GC

## 2024-06-24 RX ORDER — RESPIRATORY SYNCYTIAL VIRUS VACCINE 120MCG/0.5
0.5 KIT INTRAMUSCULAR ONCE
Qty: 1 EACH | Refills: 0 | Status: CANCELLED | OUTPATIENT
Start: 2024-06-24 | End: 2024-06-24

## 2024-06-24 RX ORDER — LISINOPRIL 20 MG/1
20 TABLET ORAL DAILY
Qty: 90 TABLET | Refills: 1 | Status: SHIPPED | OUTPATIENT
Start: 2024-06-24

## 2024-06-24 NOTE — PROGRESS NOTES
Assessment & Plan     Essential hypertension  Patient had been uptitrated to amlodipine 10 mg from 5 mg and noted swelling of her face and lower extremities with this dose adjustment. She stopped the amlodipine herself and switched back to lisinopril 20 mg tablets that she had at home. She only started the lisinopril three days ago, will not check BMP today. She is not willing to go back to the 5 mg dose of amlodipine, states that she only wants to be on lisinopril at this time. Discussed that she return in four weeks and if her blood pressure is persistently elevated, would recommend initiation of a thiazide at that time.   - lisinopril (ZESTRIL) 20 MG tablet  Dispense: 90 tablet; Refill: 1  - Discontinue amlodipine    SI (sacroiliac) joint dysfunction  Presents for follow-up of hip pain, attributed to SI joint dysfunction with bilateral radiculopathy at last visit. At that time we prescribed a ten day course of naproxen which she did not find helpful. She also did not like the gabapentin even at small doses, stating that it made her dizzy. She notes that her hip pain is overall better since she has cut back on sweets; prefers to manage this problem through dietary interventions at this time.  - Discontinue gabapentin.    Return in about 4 weeks (around 7/22/2024).    Destiny Mancuso is a 69 year old, presenting for the following health issues:  Musculoskeletal Problem (Hip follow up)    HPI     Hip pain follow-up  - Last seen on 6/3 for hip pain, attributed to bilateral SI joint dysfunction with sciatica   - Prescribed ten day course of naproxen with recommendation to start gabapentin after that if naproxen course was ineffective.  - Also discussed physical therapy at that time  - Pain today: Overall feels like her pain is better today because she has avoided inflammatory foods.   - Didn't feel like the naproxen was very helpful   - Gabapentin makes her feel drowsy, like the room is spinning, even low dose.   -  "Tried therapy in the past, wants to just continue exercises.     Hypertension  - Stopped taking the amlodipine because she noted swelling of the legs, this happened when she switched to 10 mg. Stopped the amlodipine three days ago  - Restarted her lisinopril three days ago.       Objective    BP (!) 142/83 (BP Location: Right arm, Patient Position: Sitting, Cuff Size: Adult Regular)   Pulse 80   Temp 98.1  F (36.7  C)   Resp 20   Ht 1.464 m (4' 9.64\")   Wt 84.4 kg (186 lb 1.3 oz)   SpO2 96%   BMI 39.38 kg/m    Body mass index is 39.38 kg/m .  Physical Exam     GENERAL: Healthy, alert and no distress  EYES: Eyes grossly normal to inspection.  No discharge or erythema, or obvious scleral/conjunctival abnormalities.  RESP: No audible wheeze, cough, or visible cyanosis.  No visible retractions or increased work of breathing.    SKIN: Visible skin clear. No significant rash, abnormal pigmentation or lesions.  NEURO: Cranial nerves grossly intact.  Mentation and speech appropriate for age.  PSYCH: Mentation appears normal, affect normal/bright, judgement and insight intact, normal speech and appearance well-groomed.      Signed Electronically by: Mary Riddle MD    "

## 2024-08-06 ENCOUNTER — OFFICE VISIT (OUTPATIENT)
Dept: FAMILY MEDICINE | Facility: CLINIC | Age: 69
End: 2024-08-06
Payer: COMMERCIAL

## 2024-08-06 VITALS
WEIGHT: 191 LBS | HEART RATE: 71 BPM | RESPIRATION RATE: 16 BRPM | OXYGEN SATURATION: 99 % | BODY MASS INDEX: 40.42 KG/M2 | TEMPERATURE: 98 F | DIASTOLIC BLOOD PRESSURE: 79 MMHG | SYSTOLIC BLOOD PRESSURE: 180 MMHG

## 2024-08-06 DIAGNOSIS — M53.3 SI (SACROILIAC) JOINT DYSFUNCTION: ICD-10-CM

## 2024-08-06 DIAGNOSIS — R06.09 DOE (DYSPNEA ON EXERTION): ICD-10-CM

## 2024-08-06 DIAGNOSIS — R53.81 PHYSICAL DECONDITIONING: Primary | ICD-10-CM

## 2024-08-06 DIAGNOSIS — I25.2 HISTORY OF NON-ST ELEVATION MYOCARDIAL INFARCTION (NSTEMI): ICD-10-CM

## 2024-08-06 DIAGNOSIS — R53.83 OTHER FATIGUE: ICD-10-CM

## 2024-08-06 DIAGNOSIS — R73.03 PRE-DIABETES: ICD-10-CM

## 2024-08-06 LAB
BASOPHILS # BLD AUTO: 0 10E3/UL (ref 0–0.2)
BASOPHILS NFR BLD AUTO: 0 %
EOSINOPHIL # BLD AUTO: 0.1 10E3/UL (ref 0–0.7)
EOSINOPHIL NFR BLD AUTO: 1 %
ERYTHROCYTE [DISTWIDTH] IN BLOOD BY AUTOMATED COUNT: 13.5 % (ref 10–15)
HBA1C MFR BLD: 6 % (ref 0–5.6)
HCT VFR BLD AUTO: 36.1 % (ref 35–47)
HGB BLD-MCNC: 11.3 G/DL (ref 11.7–15.7)
IMM GRANULOCYTES # BLD: 0.1 10E3/UL
IMM GRANULOCYTES NFR BLD: 0 %
LYMPHOCYTES # BLD AUTO: 2.2 10E3/UL (ref 0.8–5.3)
LYMPHOCYTES NFR BLD AUTO: 17 %
MCH RBC QN AUTO: 29 PG (ref 26.5–33)
MCHC RBC AUTO-ENTMCNC: 31.3 G/DL (ref 31.5–36.5)
MCV RBC AUTO: 93 FL (ref 78–100)
MONOCYTES # BLD AUTO: 0.8 10E3/UL (ref 0–1.3)
MONOCYTES NFR BLD AUTO: 6 %
NEUTROPHILS # BLD AUTO: 10.3 10E3/UL (ref 1.6–8.3)
NEUTROPHILS NFR BLD AUTO: 76 %
PLATELET # BLD AUTO: 213 10E3/UL (ref 150–450)
RBC # BLD AUTO: 3.89 10E6/UL (ref 3.8–5.2)
WBC # BLD AUTO: 13.5 10E3/UL (ref 4–11)

## 2024-08-06 PROCEDURE — 80053 COMPREHEN METABOLIC PANEL: CPT | Performed by: FAMILY MEDICINE

## 2024-08-06 PROCEDURE — 99214 OFFICE O/P EST MOD 30 MIN: CPT | Performed by: FAMILY MEDICINE

## 2024-08-06 PROCEDURE — 84443 ASSAY THYROID STIM HORMONE: CPT | Performed by: FAMILY MEDICINE

## 2024-08-06 PROCEDURE — 83036 HEMOGLOBIN GLYCOSYLATED A1C: CPT | Performed by: FAMILY MEDICINE

## 2024-08-06 PROCEDURE — G2211 COMPLEX E/M VISIT ADD ON: HCPCS | Performed by: FAMILY MEDICINE

## 2024-08-06 PROCEDURE — 85025 COMPLETE CBC W/AUTO DIFF WBC: CPT | Performed by: FAMILY MEDICINE

## 2024-08-06 PROCEDURE — 36415 COLL VENOUS BLD VENIPUNCTURE: CPT | Performed by: FAMILY MEDICINE

## 2024-08-06 ASSESSMENT — PATIENT HEALTH QUESTIONNAIRE - PHQ9
SUM OF ALL RESPONSES TO PHQ QUESTIONS 1-9: 13
10. IF YOU CHECKED OFF ANY PROBLEMS, HOW DIFFICULT HAVE THESE PROBLEMS MADE IT FOR YOU TO DO YOUR WORK, TAKE CARE OF THINGS AT HOME, OR GET ALONG WITH OTHER PEOPLE: NOT DIFFICULT AT ALL
SUM OF ALL RESPONSES TO PHQ QUESTIONS 1-9: 13

## 2024-08-06 NOTE — PROGRESS NOTES
Assessment & Plan     Jamee Muhammad is a 69 year old female with pmhx including CAD w/NSTEMI in 9/2023, HTN, elevated BMI > 40, OA seen today for the following    (R53.81) Physical deconditioning  (primary encounter diagnosis)  Comment: Given symptoms present since her NSTEMI and slowly worsening, suspect her fatigue and shortness of breath primarily represents deconditioning related to decreased cardiac function.  Recommended physical therapy/cardiac rehab.   Plan: Physical Therapy  Referral          (R53.83) Other fatigue  Comment: As above.  Evaluate for other possibilities including dehydration, uremia.  Prior TSH reasonable at 4.93 and will reevaluate today.  A1c in September 2023 6.0%.  Possibly has transition to diabetes which could lead to fatigue.  Will also reevaluate cardiac function with echocardiogram.  Plan: Comprehensive metabolic panel, CBC with         Platelets & Differential, TSH with free T4         reflex, Hemoglobin A1c, Echocardiogram         Complete, Physical Therapy  Referral          (R06.09) KING (dyspnea on exertion)  Comment: As above.  No findings on exam to suggest volume overload or heart failure and again suspect primarily deconditioning.  Will obtain echocardiogram as above.  Noted she does have follow-up with cardiology later this month  Plan: CBC with Platelets & Differential,         Echocardiogram Complete, Physical Therapy          Referral          (I25.2) History of non-ST elevation myocardial infarction (NSTEMI)  Comment: Continues on daily atorvastatin and aspirin appropriately.  Plan: Comprehensive metabolic panel, Echocardiogram         Complete, Physical Therapy  Referral          (M53.3) SI (sacroiliac) joint dysfunction  Comment: Inhibiting function likely contributing to deconditioning.  Recommended PT for directed exercises.  Plan: Physical Therapy  Referral          (R73.03) Pre-diabetes  Comment: As above  Plan: Hemoglobin  A1c    Benjamin Rosenstein, MD, MA  [facility]     This note was completed with the assistance of dictation software. Typos and word substitution-errors are expected and unintended.      MDM: Undiagnosed new problem with unclear prognosis; 3+ tests ordered    The longitudinal plan of care for the diagnosis(es)/condition(s) as documented were addressed during this visit. Due to the added complexity in care, I will continue to support Jamee in the subsequent management and with ongoing continuity of care.            Subjective   Jamee is a 69 year old, presenting for the following health issues:  Weight Problem (Discuss weight/)    HPI     Seen today for concerns of fatigue and gaining weight.  Says she is noticed increased fatigue and difficulty with activity since her heart attack which occurred in September 2023.  Since that time has been trying to maintain a good diet but feels as though she continues to gain weight.  On review has gained about 10 pounds since September.  She does try to do some exercise but is limited by arthritis in her hips, points to her buttocks on both sides.  She does note drinking a lot of water to avoid leg cramping.    Feels as though everything she does she has trouble catching her breath.  Has this with activities like going up and down the stairs.  Notably did not participate in cardiac rehabilitation following her NSTEMI.  Main exercises she does her stretches and walks but says she is only able to walk half a block though due to the pain in her hips, not shortness of breath.  Does feel like her fatigue is slowly getting worse.    She has no chest pain or pressure.  No lower extremity swelling.  No shortness of breath at rest.  Sleeping okay.        Objective    BP (!) 180/79   Pulse 71   Temp 98  F (36.7  C) (Tympanic)   Resp 16   Wt 86.6 kg (191 lb)   SpO2 99%   BMI 40.42 kg/m    Body mass index is 40.42 kg/m .  Physical Exam     General: Awake, seated  comfortably, appears well and NAD  CV: RRR, normal S1/S2, no murmurs/rubs/gallops, trace LE edema bilaterall  Pulm: Normal WOB, lungs CTAB, no wheezes or crackles, good air movement   GI: Abdomen soft, not tender, not distended, BS normal and active throughout  Neuro: Alert, answering questions appropriately, normal thought processes; Normal Gait     Results for orders placed or performed in visit on 08/06/24   Hemoglobin A1c     Status: Abnormal   Result Value Ref Range    Hemoglobin A1C 6.0 (H) 0.0 - 5.6 %   CBC with platelets and differential     Status: Abnormal   Result Value Ref Range    WBC Count 13.5 (H) 4.0 - 11.0 10e3/uL    RBC Count 3.89 3.80 - 5.20 10e6/uL    Hemoglobin 11.3 (L) 11.7 - 15.7 g/dL    Hematocrit 36.1 35.0 - 47.0 %    MCV 93 78 - 100 fL    MCH 29.0 26.5 - 33.0 pg    MCHC 31.3 (L) 31.5 - 36.5 g/dL    RDW 13.5 10.0 - 15.0 %    Platelet Count 213 150 - 450 10e3/uL    % Neutrophils 76 %    % Lymphocytes 17 %    % Monocytes 6 %    % Eosinophils 1 %    % Basophils 0 %    % Immature Granulocytes 0 %    Absolute Neutrophils 10.3 (H) 1.6 - 8.3 10e3/uL    Absolute Lymphocytes 2.2 0.8 - 5.3 10e3/uL    Absolute Monocytes 0.8 0.0 - 1.3 10e3/uL    Absolute Eosinophils 0.1 0.0 - 0.7 10e3/uL    Absolute Basophils 0.0 0.0 - 0.2 10e3/uL    Absolute Immature Granulocytes 0.1 <=0.4 10e3/uL   CBC with Platelets & Differential     Status: Abnormal    Narrative    The following orders were created for panel order CBC with Platelets & Differential.  Procedure                               Abnormality         Status                     ---------                               -----------         ------                     CBC with platelets and d...[[id number]]  Abnormal            Final result                 Please view results for these tests on the individual orders.           Signed Electronically by: Benjamin Rosenstein, MD

## 2024-08-06 NOTE — LETTER
September 3, 2024      Jamee Muhammad  [address] [place] [address]        Dear ,    We are writing to inform you of your test results.      Sharon Mancuso. I've reviewed your labs.     -Your kidney labs are a little increased from when we checked last October, which may be related to your heart. Getting the echocardiogram done will help us with next steps     -Your blood counts showed your white blood cells were a little elevated. I'm not sure why. I don't think you have an infection.     -Your A1c, the diabetes test, did not show diabetes.     Your thyroid test was a little low. I'd recommend decreasing your thyroid medicine - levothyroxine - to 50mcg daily. I've sent a new prescription     Please also schedule to come in for a lab visit to repeat your blood tests.     Thank you   Benjamin Rosenstein, MD,MA     Resulted Orders   Comprehensive metabolic panel   Result Value Ref Range    Sodium 140 135 - 145 mmol/L    Potassium 4.2 3.4 - 5.3 mmol/L    Carbon Dioxide (CO2) 19 (L) 22 - 29 mmol/L    Anion Gap 12 7 - 15 mmol/L    Urea Nitrogen 50.7 (H) 8.0 - 23.0 mg/dL    Creatinine 1.35 (H) 0.51 - 0.95 mg/dL    GFR Estimate 43 (L) >60 mL/min/1.73m2      Comment:      eGFR calculated using 2021 CKD-EPI equation.    Calcium 8.9 8.8 - 10.4 mg/dL      Comment:      Reference intervals for this test were updated on 7/16/2024 to reflect our healthy population more accurately. There may be differences in the flagging of prior results with similar values performed with this method. Those prior results can be interpreted in the context of the updated reference intervals.    Chloride 109 (H) 98 - 107 mmol/L    Glucose 120 (H) 70 - 99 mg/dL    Alkaline Phosphatase 81 40 - 150 U/L    AST 20 0 - 45 U/L    ALT 30 0 - 50 U/L    Protein Total 6.9 6.4 - 8.3 g/dL    Albumin 3.9 3.5 - 5.2 g/dL    Bilirubin Total 0.4 <=1.2 mg/dL   TSH with free T4 reflex   Result Value Ref Range    TSH 0.58 0.30 - 4.20 uIU/mL   Hemoglobin A1c   Result  Value Ref Range    Hemoglobin A1C 6.0 (H) 0.0 - 5.6 %      Comment:      Normal <5.7%   Prediabetes 5.7-6.4%    Diabetes 6.5% or higher     Note: Adopted from ADA consensus guidelines.   CBC with platelets and differential   Result Value Ref Range    WBC Count 13.5 (H) 4.0 - 11.0 10e3/uL    RBC Count 3.89 3.80 - 5.20 10e6/uL    Hemoglobin 11.3 (L) 11.7 - 15.7 g/dL    Hematocrit 36.1 35.0 - 47.0 %    MCV 93 78 - 100 fL    MCH 29.0 26.5 - 33.0 pg    MCHC 31.3 (L) 31.5 - 36.5 g/dL    RDW 13.5 10.0 - 15.0 %    Platelet Count 213 150 - 450 10e3/uL    % Neutrophils 76 %    % Lymphocytes 17 %    % Monocytes 6 %    % Eosinophils 1 %    % Basophils 0 %    % Immature Granulocytes 0 %    Absolute Neutrophils 10.3 (H) 1.6 - 8.3 10e3/uL    Absolute Lymphocytes 2.2 0.8 - 5.3 10e3/uL    Absolute Monocytes 0.8 0.0 - 1.3 10e3/uL    Absolute Eosinophils 0.1 0.0 - 0.7 10e3/uL    Absolute Basophils 0.0 0.0 - 0.2 10e3/uL    Absolute Immature Granulocytes 0.1 <=0.4 10e3/uL       If you have any questions or concerns, please call the clinic at the number listed above.       Sincerely,      Benjamin Rosenstein, MD

## 2024-08-07 ENCOUNTER — TELEPHONE (OUTPATIENT)
Dept: FAMILY MEDICINE | Facility: CLINIC | Age: 69
End: 2024-08-07
Payer: COMMERCIAL

## 2024-08-07 DIAGNOSIS — I21.4 NSTEMI (NON-ST ELEVATED MYOCARDIAL INFARCTION) (H): Primary | ICD-10-CM

## 2024-08-07 LAB
ALBUMIN SERPL BCG-MCNC: 3.9 G/DL (ref 3.5–5.2)
ALP SERPL-CCNC: 81 U/L (ref 40–150)
ALT SERPL W P-5'-P-CCNC: 30 U/L (ref 0–50)
ANION GAP SERPL CALCULATED.3IONS-SCNC: 12 MMOL/L (ref 7–15)
AST SERPL W P-5'-P-CCNC: 20 U/L (ref 0–45)
BILIRUB SERPL-MCNC: 0.4 MG/DL
BUN SERPL-MCNC: 50.7 MG/DL (ref 8–23)
CALCIUM SERPL-MCNC: 8.9 MG/DL (ref 8.8–10.4)
CHLORIDE SERPL-SCNC: 109 MMOL/L (ref 98–107)
CREAT SERPL-MCNC: 1.35 MG/DL (ref 0.51–0.95)
EGFRCR SERPLBLD CKD-EPI 2021: 43 ML/MIN/1.73M2
GLUCOSE SERPL-MCNC: 120 MG/DL (ref 70–99)
HCO3 SERPL-SCNC: 19 MMOL/L (ref 22–29)
POTASSIUM SERPL-SCNC: 4.2 MMOL/L (ref 3.4–5.3)
PROT SERPL-MCNC: 6.9 G/DL (ref 6.4–8.3)
SODIUM SERPL-SCNC: 140 MMOL/L (ref 135–145)
TSH SERPL DL<=0.005 MIU/L-ACNC: 0.58 UIU/ML (ref 0.3–4.2)

## 2024-08-07 NOTE — TELEPHONE ENCOUNTER
Writer received a call from patient. Patient provided verbal request to provide results to her daughter Shalonda Muhammad. Results provided verbally to Jose M Pascual RN

## 2024-08-08 ENCOUNTER — APPOINTMENT (OUTPATIENT)
Dept: INTERPRETER SERVICES | Facility: CLINIC | Age: 69
End: 2024-08-08
Payer: COMMERCIAL

## 2024-08-09 DIAGNOSIS — E03.9 ACQUIRED HYPOTHYROIDISM: ICD-10-CM

## 2024-08-09 DIAGNOSIS — D72.829 LEUKOCYTOSIS, UNSPECIFIED TYPE: ICD-10-CM

## 2024-08-09 DIAGNOSIS — I25.2 HISTORY OF NON-ST ELEVATION MYOCARDIAL INFARCTION (NSTEMI): Primary | ICD-10-CM

## 2024-08-09 RX ORDER — LEVOTHYROXINE SODIUM 50 UG/1
50 TABLET ORAL DAILY
Qty: 90 TABLET | Refills: 0 | Status: SHIPPED | OUTPATIENT
Start: 2024-08-09

## 2024-08-22 ENCOUNTER — HOSPITAL ENCOUNTER (OUTPATIENT)
Dept: CARDIOLOGY | Facility: HOSPITAL | Age: 69
Discharge: HOME | End: 2024-08-22
Attending: FAMILY MEDICINE | Admitting: FAMILY MEDICINE
Payer: COMMERCIAL

## 2024-08-22 DIAGNOSIS — I25.2 HISTORY OF NON-ST ELEVATION MYOCARDIAL INFARCTION (NSTEMI): ICD-10-CM

## 2024-08-22 DIAGNOSIS — R53.83 OTHER FATIGUE: ICD-10-CM

## 2024-08-22 DIAGNOSIS — R06.09 DOE (DYSPNEA ON EXERTION): ICD-10-CM

## 2024-08-22 LAB — LVEF ECHO: NORMAL

## 2024-08-22 PROCEDURE — 255N000002 HC RX 255 OP 636: Performed by: FAMILY MEDICINE

## 2024-08-22 PROCEDURE — C8929 TTE W OR WO FOL WCON,DOPPLER: HCPCS

## 2024-08-22 RX ADMIN — PERFLUTREN 2 ML: 6.52 INJECTION, SUSPENSION INTRAVENOUS at 11:16

## 2024-09-18 ENCOUNTER — TELEPHONE (OUTPATIENT)
Dept: FAMILY MEDICINE | Facility: CLINIC | Age: 69
End: 2024-09-18
Payer: COMMERCIAL

## 2024-09-18 NOTE — TELEPHONE ENCOUNTER
Writer spoke with patient's daughter who will reach out to patient to schedule. No further action from colored team needed at this time. Samara REYNAGA

## 2024-09-18 NOTE — TELEPHONE ENCOUNTER
Patient would like to know if she can get a weight loss medication since her A1C is 6.0 and her ECHO was normal. Patient stated she had discussed with PCP but PCP wanted to check her heart first. Now that it is complete, patient would like to start a weight loss medication if able. Samara REYNAGA

## 2024-09-18 NOTE — TELEPHONE ENCOUNTER
Writer scheduled patient for Friday, unable to come in today. If any labs need to be completed, they can be completed tomorrow or prior to appointment time since the appointment is at 420 pm. Routing to PCP to order any anticipated labs for patient if applicable. Samara REYNAGA

## 2024-09-19 ENCOUNTER — LAB (OUTPATIENT)
Dept: LAB | Facility: CLINIC | Age: 69
End: 2024-09-19
Payer: COMMERCIAL

## 2024-09-19 DIAGNOSIS — D72.829 LEUKOCYTOSIS, UNSPECIFIED TYPE: ICD-10-CM

## 2024-09-19 DIAGNOSIS — I25.2 HISTORY OF NON-ST ELEVATION MYOCARDIAL INFARCTION (NSTEMI): ICD-10-CM

## 2024-09-19 LAB
ALBUMIN SERPL BCG-MCNC: 3.8 G/DL (ref 3.5–5.2)
ALP SERPL-CCNC: 79 U/L (ref 40–150)
ALT SERPL W P-5'-P-CCNC: 23 U/L (ref 0–50)
ANION GAP SERPL CALCULATED.3IONS-SCNC: 11 MMOL/L (ref 7–15)
AST SERPL W P-5'-P-CCNC: 23 U/L (ref 0–45)
BASOPHILS # BLD AUTO: 0 10E3/UL (ref 0–0.2)
BASOPHILS NFR BLD AUTO: 0 %
BILIRUB SERPL-MCNC: 0.6 MG/DL
BUN SERPL-MCNC: 41.9 MG/DL (ref 8–23)
CALCIUM SERPL-MCNC: 9 MG/DL (ref 8.8–10.4)
CHLORIDE SERPL-SCNC: 113 MMOL/L (ref 98–107)
CREAT SERPL-MCNC: 1.12 MG/DL (ref 0.51–0.95)
EGFRCR SERPLBLD CKD-EPI 2021: 54 ML/MIN/1.73M2
EOSINOPHIL # BLD AUTO: 0.2 10E3/UL (ref 0–0.7)
EOSINOPHIL NFR BLD AUTO: 1 %
ERYTHROCYTE [DISTWIDTH] IN BLOOD BY AUTOMATED COUNT: 13.6 % (ref 10–15)
GLUCOSE SERPL-MCNC: 111 MG/DL (ref 70–99)
HCO3 SERPL-SCNC: 18 MMOL/L (ref 22–29)
HCT VFR BLD AUTO: 38.4 % (ref 35–47)
HGB BLD-MCNC: 12.1 G/DL (ref 11.7–15.7)
IMM GRANULOCYTES # BLD: 0.3 10E3/UL
IMM GRANULOCYTES NFR BLD: 2 %
LYMPHOCYTES # BLD AUTO: 2.5 10E3/UL (ref 0.8–5.3)
LYMPHOCYTES NFR BLD AUTO: 20 %
MCH RBC QN AUTO: 29.2 PG (ref 26.5–33)
MCHC RBC AUTO-ENTMCNC: 31.5 G/DL (ref 31.5–36.5)
MCV RBC AUTO: 93 FL (ref 78–100)
MONOCYTES # BLD AUTO: 0.9 10E3/UL (ref 0–1.3)
MONOCYTES NFR BLD AUTO: 8 %
NEUTROPHILS # BLD AUTO: 8.6 10E3/UL (ref 1.6–8.3)
NEUTROPHILS NFR BLD AUTO: 69 %
NRBC # BLD AUTO: 0 10E3/UL
NRBC BLD AUTO-RTO: 0 /100
PLATELET # BLD AUTO: 247 10E3/UL (ref 150–450)
POTASSIUM SERPL-SCNC: 4.2 MMOL/L (ref 3.4–5.3)
PROT SERPL-MCNC: 6.8 G/DL (ref 6.4–8.3)
RBC # BLD AUTO: 4.14 10E6/UL (ref 3.8–5.2)
RETICS # AUTO: 0.11 10E6/UL (ref 0.03–0.1)
RETICS/RBC NFR AUTO: 2.7 % (ref 0.5–2)
SODIUM SERPL-SCNC: 142 MMOL/L (ref 135–145)
WBC # BLD AUTO: 12.5 10E3/UL (ref 4–11)

## 2024-09-19 PROCEDURE — 85045 AUTOMATED RETICULOCYTE COUNT: CPT

## 2024-09-19 PROCEDURE — 85025 COMPLETE CBC W/AUTO DIFF WBC: CPT

## 2024-09-19 PROCEDURE — 99207 BLOOD MORPHOLOGY PATHOLOGIST REVIEW: CPT | Performed by: PATHOLOGY

## 2024-09-19 PROCEDURE — 36415 COLL VENOUS BLD VENIPUNCTURE: CPT

## 2024-09-19 PROCEDURE — 80053 COMPREHEN METABOLIC PANEL: CPT

## 2024-09-20 ENCOUNTER — OFFICE VISIT (OUTPATIENT)
Dept: FAMILY MEDICINE | Facility: CLINIC | Age: 69
End: 2024-09-20
Payer: COMMERCIAL

## 2024-09-20 VITALS
OXYGEN SATURATION: 99 % | RESPIRATION RATE: 18 BRPM | HEART RATE: 83 BPM | DIASTOLIC BLOOD PRESSURE: 72 MMHG | BODY MASS INDEX: 40.63 KG/M2 | SYSTOLIC BLOOD PRESSURE: 133 MMHG | WEIGHT: 192 LBS

## 2024-09-20 DIAGNOSIS — D72.829 LEUKOCYTOSIS, UNSPECIFIED TYPE: ICD-10-CM

## 2024-09-20 DIAGNOSIS — R06.09 DOE (DYSPNEA ON EXERTION): ICD-10-CM

## 2024-09-20 DIAGNOSIS — M46.1 SI JOINT ARTHRITIS (H): ICD-10-CM

## 2024-09-20 DIAGNOSIS — E66.813 CLASS 3 SEVERE OBESITY DUE TO EXCESS CALORIES WITH SERIOUS COMORBIDITY AND BODY MASS INDEX (BMI) OF 40.0 TO 44.9 IN ADULT (H): ICD-10-CM

## 2024-09-20 DIAGNOSIS — I25.2 HISTORY OF NON-ST ELEVATION MYOCARDIAL INFARCTION (NSTEMI): ICD-10-CM

## 2024-09-20 DIAGNOSIS — R63.5 WEIGHT GAIN: ICD-10-CM

## 2024-09-20 DIAGNOSIS — E66.01 CLASS 3 SEVERE OBESITY DUE TO EXCESS CALORIES WITH SERIOUS COMORBIDITY AND BODY MASS INDEX (BMI) OF 40.0 TO 44.9 IN ADULT (H): ICD-10-CM

## 2024-09-20 DIAGNOSIS — R53.81 PHYSICAL DECONDITIONING: Primary | ICD-10-CM

## 2024-09-20 LAB
PATH REPORT.COMMENTS IMP SPEC: NORMAL
PATH REPORT.COMMENTS IMP SPEC: NORMAL
PATH REPORT.FINAL DX SPEC: NORMAL
PATH REPORT.MICROSCOPIC SPEC OTHER STN: NORMAL
PATH REPORT.RELEVANT HX SPEC: NORMAL

## 2024-09-20 PROCEDURE — 99214 OFFICE O/P EST MOD 30 MIN: CPT | Performed by: FAMILY MEDICINE

## 2024-09-20 PROCEDURE — G2211 COMPLEX E/M VISIT ADD ON: HCPCS | Performed by: FAMILY MEDICINE

## 2024-09-20 NOTE — PROGRESS NOTES
Assessment & Plan     Jamee Muhammad is a 69 year old female with pmhx including CAD w/hx NSTEMI 9/2023, BMI > 40, OA seen today for the following    (R53.81) Physical deconditioning  (primary encounter diagnosis)  (I25.2) History of non-ST elevation myocardial infarction (NSTEMI)  (R06.09) KING (dyspnea on exertion)  Comment: Symptoms of fatigue, dyspnea on exertion, all started since her prior NSTEMI.  Likely related to associated deconditioning and lack of cardiac rehabilitation. Did obtain echocardiogram to evaluate for worsening systolic function, though this was overall unchanged.  Did show borderline LVH and she may have mild diastolic dysfunction though not seen on echocardiogram. Again recommended cardiac rehabilitation today and she will reschedule.  Start Mounjaro as below  Plan: tirzepatide (MOUNJARO) 2.5 MG/0.5ML pen          (Z68.41) Body mass index (BMI) of 40.0-44.9 in adult (H)  (R63.5) Weight gain  Comment: Elevated BMI with associated weight gain over the past number of months with decreased activity and deconditioning following her NSTEMI.  Discussed overall plan for weight loss including dietary modification she has been making, increased activity including rehabilitation, but reasonable to send with medication to assist to decrease overall risk as well as cardiac benefits that been noted with weight loss and GLP-1ra.  Follow-up in a month for dose adjustment  Plan: tirzepatide (MOUNJARO) 2.5 MG/0.5ML pen    (M47.818) SI joint arthritis  Comment: Likely SIJ joint arthritis.  Would not suspect new onset ankylosing spondylitis, though consideration with unclear etiology of leukocytosis.  Will obtain imaging, not available at time of this visit, she will return for x-rays.  Plan: XR Pelvis and Hip Bilateral 2 Views          (D72.829) Leukocytosis, unspecified type  Comment: Etiology unclear.  Most recent labs showed remains elevated 12.5 from prior 13.5.  Morphology did not show left shift, neoplastic  leukocytes, or other concerning findings.  Suspect mild inflammation possibly related to osteoarthritis though consideration for inflammatory arthritis.  She has no concerning signs or symptoms on exam.  Will continue to monitor clinically and repeat labs at follow-up    Benjamin Rosenstein, MD, MA  [facility]     This note was completed with the assistance of dictation software. Typos and word substitution-errors are expected and unintended.      MDM: 2+ chronic conditions, stable; medication mgmt    The longitudinal plan of care for the diagnosis(es)/condition(s) as documented were addressed during this visit. Due to the added complexity in care, I will continue to support Jamee in the subsequent management and with ongoing continuity of care.        Subjective   Jamee is a 69 year old, presenting for the following health issues:  Medication Request (Med request for weight loss)        6/24/2024     3:48 PM   Additional Questions   Roomed by zohaib ly   Accompanied by self     HPI     Here today to discuss ongoing concerns of weight gain.  Feels as though her weight continues to increase.  Weight is currently 192 pounds, up from 182 pounds as of May of this past year.  She has focused on making changes in her diet particularly related to portion size.  Congratulated her on this focus.  Did discuss activity.  She notes she has had difficulties with activity due to pain in her hips.  She tries to get some walks and but is limited due to pain.  She had appointment with physical therapy previously arranged but she canceled due to needing to attend to her family members passing.  She did not really schedule this as she felt it may not be necessary as she has done physical therapy before.  Again reviewed the recommendation for specifically cardiac rehabilitation therapy and that this would be part of an ongoing plan.    As far as her hip pain, she points to her mid buttocks bilaterally.  Less so  her low back.  Not into her lateral hip or groin.  Does note having significantly increased pain in the morning that improves after taking Tylenol.  Can initially improve with movement but then gets worse over time.  No pains in her thighs or shoulders.  No numbness or tingling.        Objective    /72   Pulse 83   Resp 18   Wt 87.1 kg (192 lb)   SpO2 99%   BMI 40.63 kg/m    Body mass index is 40.63 kg/m .  Physical Exam     General: Awake, seated comfortably, appears well and NAD  CV: RRR, normal S1/S2, no murmurs/rubs/gallops  Pulm: Normal WOB, lungs CTAB, no wheezes or crackles  Neuro: Alert, answering questions appropriately, normal thought processes; Normal Gait     Lab on 09/19/2024   Component Date Value Ref Range Status    Sodium 09/19/2024 142  135 - 145 mmol/L Final    Potassium 09/19/2024 4.2  3.4 - 5.3 mmol/L Final    Carbon Dioxide (CO2) 09/19/2024 18 (L)  22 - 29 mmol/L Final    Anion Gap 09/19/2024 11  7 - 15 mmol/L Final    Urea Nitrogen 09/19/2024 41.9 (H)  8.0 - 23.0 mg/dL Final    Creatinine 09/19/2024 1.12 (H)  0.51 - 0.95 mg/dL Final    GFR Estimate 09/19/2024 54 (L)  >60 mL/min/1.73m2 Final    eGFR calculated using 2021 CKD-EPI equation.    Calcium 09/19/2024 9.0  8.8 - 10.4 mg/dL Final    Reference intervals for this test were updated on 7/16/2024 to reflect our healthy population more accurately. There may be differences in the flagging of prior results with similar values performed with this method. Those prior results can be interpreted in the context of the updated reference intervals.    Chloride 09/19/2024 113 (H)  98 - 107 mmol/L Final    Glucose 09/19/2024 111 (H)  70 - 99 mg/dL Final    Alkaline Phosphatase 09/19/2024 79  40 - 150 U/L Final    AST 09/19/2024 23  0 - 45 U/L Final    ALT 09/19/2024 23  0 - 50 U/L Final    Protein Total 09/19/2024 6.8  6.4 - 8.3 g/dL Final    Albumin 09/19/2024 3.8  3.5 - 5.2 g/dL Final    Bilirubin Total 09/19/2024 0.6  <=1.2 mg/dL Final     Final Diagnosis 09/19/2024    Final                    Value:This result contains rich text formatting which cannot be displayed here.    Comment 09/19/2024    Final                    Value:This result contains rich text formatting which cannot be displayed here.    Clinical Information 09/19/2024    Final                    Value:This result contains rich text formatting which cannot be displayed here.    Peripheral Smear 09/19/2024    Final                    Value:This result contains rich text formatting which cannot be displayed here.    Performing Labs 09/19/2024    Final                    Value:This result contains rich text formatting which cannot be displayed here.    WBC Count 09/19/2024 12.5 (H)  4.0 - 11.0 10e3/uL Final    RBC Count 09/19/2024 4.14  3.80 - 5.20 10e6/uL Final    Hemoglobin 09/19/2024 12.1  11.7 - 15.7 g/dL Final    Hematocrit 09/19/2024 38.4  35.0 - 47.0 % Final    MCV 09/19/2024 93  78 - 100 fL Final    MCH 09/19/2024 29.2  26.5 - 33.0 pg Final    MCHC 09/19/2024 31.5  31.5 - 36.5 g/dL Final    RDW 09/19/2024 13.6  10.0 - 15.0 % Final    Platelet Count 09/19/2024 247  150 - 450 10e3/uL Final    % Neutrophils 09/19/2024 69  % Final    % Lymphocytes 09/19/2024 20  % Final    % Monocytes 09/19/2024 8  % Final    % Eosinophils 09/19/2024 1  % Final    % Basophils 09/19/2024 0  % Final    % Immature Granulocytes 09/19/2024 2  % Final    NRBCs per 100 WBC 09/19/2024 0  <1 /100 Final    Absolute Neutrophils 09/19/2024 8.6 (H)  1.6 - 8.3 10e3/uL Final    Absolute Lymphocytes 09/19/2024 2.5  0.8 - 5.3 10e3/uL Final    Absolute Monocytes 09/19/2024 0.9  0.0 - 1.3 10e3/uL Final    Absolute Eosinophils 09/19/2024 0.2  0.0 - 0.7 10e3/uL Final    Absolute Basophils 09/19/2024 0.0  0.0 - 0.2 10e3/uL Final    Absolute Immature Granulocytes 09/19/2024 0.3  <=0.4 10e3/uL Final    Absolute NRBCs 09/19/2024 0.0  10e3/uL Final    % Reticulocyte 09/19/2024 2.7 (H)  0.5 - 2.0 % Final    Absolute Reticulocyte  09/19/2024 0.108 (H)  0.025 - 0.095 10e6/uL Final           Signed Electronically by: Benjamin Rosenstein, MD

## 2024-09-23 ENCOUNTER — ANCILLARY PROCEDURE (OUTPATIENT)
Dept: GENERAL RADIOLOGY | Facility: CLINIC | Age: 69
End: 2024-09-23
Attending: FAMILY MEDICINE
Payer: COMMERCIAL

## 2024-09-23 ENCOUNTER — TELEPHONE (OUTPATIENT)
Dept: FAMILY MEDICINE | Facility: CLINIC | Age: 69
End: 2024-09-23

## 2024-09-23 DIAGNOSIS — M46.1 SI JOINT ARTHRITIS (H): ICD-10-CM

## 2024-09-23 PROCEDURE — 73522 X-RAY EXAM HIPS BI 3-4 VIEWS: CPT | Mod: TC | Performed by: RADIOLOGY

## 2024-09-23 NOTE — LETTER
Re: Tirzepatide prescription for Jamee Muhammad     To whom it may concern    Jamee Muhammad was seen by me most recently 9/20/24 in follow-up from her prior visit 8/6/24. At that time, due to ongoing weight concerns, I discussed, reviewed, and prescribed Tirzepatide. As you may have read in my note and as shown in the associated diagnoses, this was prescribed for Weight Gain (R63.5) and associated Obesity with Elevated BMI > 40 (E66.01, Z68.41) both of which are appropriate diagnoses for this prescription.     As one may also read in my note, she has a history of an NSTEMI with shortness of breath and deconditioning. These conditions are related and contributory to her weight gain and elevated BMI. Additionally, GLP1-receptor agonist medications have recently been shown to have cardiovascular benefits for major adverse cardiovascular events (MACE) and mortality. Furthermore, overall weight loss is well known to provide significant cardiovascular benefit. As such, it is not unreasonable as a physician to also connect a prescription for tirzepatide to her history of an NSTEMI (a cardiovascular disease and future risk factor), the associated shortness of breath, and contributory deconditioning.     Please contact me with further questions.     Thank you    Benjamin Rosenstein, MD, MA

## 2024-09-24 NOTE — TELEPHONE ENCOUNTER
PRIOR AUTHORIZATION DENIED    Medication: TIRZEPATIDE 2.5 MG/0.5ML SC SOPN  Insurance Company: [employer] - Phone [phone] Fax [phone]  Denial Date: 9/21/2024  Denial Reason(s):     Appeal Information:         Patient Notified: No

## 2024-10-08 DIAGNOSIS — E66.01 CLASS 3 SEVERE OBESITY DUE TO EXCESS CALORIES WITH SERIOUS COMORBIDITY AND BODY MASS INDEX (BMI) OF 40.0 TO 44.9 IN ADULT (H): ICD-10-CM

## 2024-10-08 DIAGNOSIS — E66.813 CLASS 3 SEVERE OBESITY DUE TO EXCESS CALORIES WITH SERIOUS COMORBIDITY AND BODY MASS INDEX (BMI) OF 40.0 TO 44.9 IN ADULT (H): ICD-10-CM

## 2024-10-09 ENCOUNTER — TELEPHONE (OUTPATIENT)
Dept: FAMILY MEDICINE | Facility: CLINIC | Age: 69
End: 2024-10-09
Payer: COMMERCIAL

## 2024-10-09 NOTE — TELEPHONE ENCOUNTER
Prior Authorization Retail Medication Request    Medication/Dose: Zepbound 2.5mg/0.5ml  Diagnosis and ICD code (if different than what is on RX):   New/renewal/insurance change PA/secondary ins. PA:  Previously Tried and Failed:    Rationale:  Tirzepatide P.A. denied, appeal letter sent and denied. Per appeal letter for denial Zepbound is the formulary alternative.    Insurance   Primary: CMM: PRINCE  Insurance ID:      Secondary (if applicable):  Insurance ID:      Pharmacy Information (if different than what is on RX)  Name:    Phone:    Fax:    Clinic Information  [facility]

## 2024-10-11 NOTE — TELEPHONE ENCOUNTER
Retail Pharmacy Prior Authorization Team   Phone: [phone]      PA Initiation    Medication: ZEPBOUND 2.5 MG/0.5ML SC SOAJ  Insurance Company: [employer] - Phone [phone] Fax [phone]  Pharmacy Filling the Rx:    Filling Pharmacy Phone:    Filling Pharmacy Fax:    Start Date: 10/11/2024

## 2024-10-14 NOTE — TELEPHONE ENCOUNTER
Retail Pharmacy Prior Authorization Team   Phone: [phone]        Prior Authorization Approval    Medication: ZEPBOUND 2.5 MG/0.5ML SC SOAJ  Authorization Effective Date: 10/13/2024  Authorization Expiration Date: 4/13/2025  Approved Dose/Quantity: 6 MONTHS  Reference #:     Insurance Company: [employer] - Phone [phone] Fax [phone]  Expected CoPay: $    CoPay Card Available: No    Financial Assistance Needed:   Which Pharmacy is filling the prescription: [id number] - [place], [place] - [address] [place] [address]  Pharmacy Notified: YES  Patient Notified: **Instructed pharmacy to notify patient when script is ready to /ship.**

## 2024-10-18 ENCOUNTER — OFFICE VISIT (OUTPATIENT)
Dept: FAMILY MEDICINE | Facility: CLINIC | Age: 69
End: 2024-10-18
Payer: COMMERCIAL

## 2024-10-18 VITALS
TEMPERATURE: 98.2 F | DIASTOLIC BLOOD PRESSURE: 70 MMHG | HEIGHT: 58 IN | OXYGEN SATURATION: 98 % | BODY MASS INDEX: 40.3 KG/M2 | HEART RATE: 68 BPM | SYSTOLIC BLOOD PRESSURE: 119 MMHG | WEIGHT: 192 LBS | RESPIRATION RATE: 18 BRPM

## 2024-10-18 DIAGNOSIS — I25.10 CORONARY ARTERY DISEASE INVOLVING NATIVE CORONARY ARTERY OF NATIVE HEART WITHOUT ANGINA PECTORIS: ICD-10-CM

## 2024-10-18 DIAGNOSIS — Z23 NEED FOR PROPHYLACTIC VACCINATION AND INOCULATION AGAINST INFLUENZA: ICD-10-CM

## 2024-10-18 DIAGNOSIS — R53.81 PHYSICAL DECONDITIONING: ICD-10-CM

## 2024-10-18 DIAGNOSIS — I25.2 HISTORY OF NON-ST ELEVATION MYOCARDIAL INFARCTION (NSTEMI): ICD-10-CM

## 2024-10-18 DIAGNOSIS — M46.1 SI JOINT ARTHRITIS (H): ICD-10-CM

## 2024-10-18 PROCEDURE — G0008 ADMIN INFLUENZA VIRUS VAC: HCPCS | Performed by: FAMILY MEDICINE

## 2024-10-18 PROCEDURE — 90662 IIV NO PRSV INCREASED AG IM: CPT | Performed by: FAMILY MEDICINE

## 2024-10-18 PROCEDURE — 99213 OFFICE O/P EST LOW 20 MIN: CPT | Mod: 25 | Performed by: FAMILY MEDICINE

## 2024-10-18 ASSESSMENT — PATIENT HEALTH QUESTIONNAIRE - PHQ9
SUM OF ALL RESPONSES TO PHQ QUESTIONS 1-9: 6
SUM OF ALL RESPONSES TO PHQ QUESTIONS 1-9: 6
10. IF YOU CHECKED OFF ANY PROBLEMS, HOW DIFFICULT HAVE THESE PROBLEMS MADE IT FOR YOU TO DO YOUR WORK, TAKE CARE OF THINGS AT HOME, OR GET ALONG WITH OTHER PEOPLE: NOT DIFFICULT AT ALL

## 2024-10-18 NOTE — PROGRESS NOTES
Assessment & Plan     Jamee Muhammad is a 69 year old female with pmhx including CAD with NSTEMI, elevated BMI seen today for the follwoing:     (Z68.41) Body mass index (BMI) of 40.0-44.9 in adult (H)  (primary encounter diagnosis)  Comment: Just started tirzepatide.  Discussed possible side effects.  Additionally reviewed overall weight loss plan including increased activity and dietary modifications.  She is slowly increasing activity with walking.  Somewhat limited by deconditioning and joint pains.  Discussed maintaining multiple small meals through the day.  Reviewed and follow-up in about 1 to 2 months    (R53.81) Physical deconditioning  Comment: Related to the above as well as history of NSTEMI.  Continues to have decreased exercise endurance.  Recommend to continue steady increase in activity as part of overall weight loss plan.    (M46.1) SI joint arthritis (H)  Comment: Related to the above.  Discussed physical therapy and get moving program.  She will consider this.    (I25.2) History of non-ST elevation myocardial infarction (NSTEMI)  (I25.10) Coronary artery disease involving native coronary artery of native heart without angina pectoris  Comment: Has not attended cardiac rehab.  Continues to contribute to deconditioning.  Ongoing recommendations regarding therapy and activity.    Benjamin Rosenstein, MD, MA  [facility]     This note was completed with the assistance of dictation software. Typos and word substitution-errors are expected and unintended.      I spent a total of 25 minutes on the day of the visit.   Time spent by me doing chart review, history and exam, documentation and further activities per the   note    The longitudinal plan of care for the diagnosis(es)/condition(s) as documented were addressed during this visit. Due to the added complexity in care, I will continue to support Jamee in the subsequent management and with ongoing continuity of care.      Subjective  "  Jamee is a 69 year old, presenting for the following health issues:  RECHECK (Weight follow up. No concern. )      10/18/2024     9:53 AM   Additional Questions   Roomed by Hser   Accompanied by Self         10/18/2024    Information    services provided? Yes   Language Hmong   Type of interpretation provided Face-to-face    name Giovanna Pacheco    Agency [employer]     Just received zepbound and started it last night. Significant delay due to multiple PAs and appeals.   Has been going on more walks. Typically walks about 1 block before getting tired. Planning to advance over time. Discussed Get Moving program. Has been eating less and will sometimes not eat in a day.         Objective    /70   Pulse 68   Temp 98.2  F (36.8  C) (Tympanic)   Resp 18   Ht 1.475 m (4' 10.07\")   Wt 87.1 kg (192 lb)   SpO2 98%   BMI 40.03 kg/m    Body mass index is 40.03 kg/m .  Physical Exam     General: Awake, seated comfortably, appears well and NAD   CV: RRR, normal S1/S2, no murmurs/rubs/gallops, trace LE edema  Pulm: Normal WOB, lungs CTAB, no wheezes or crackles, good air movement   Neuro: Alert, answering questions appropriately, normal thought processes; Normal Gait           Signed Electronically by: Benjamin Rosenstein, MD    "

## 2024-11-06 DIAGNOSIS — E66.01 CLASS 3 SEVERE OBESITY DUE TO EXCESS CALORIES WITH SERIOUS COMORBIDITY AND BODY MASS INDEX (BMI) OF 40.0 TO 44.9 IN ADULT (H): ICD-10-CM

## 2024-11-06 DIAGNOSIS — E66.813 CLASS 3 SEVERE OBESITY DUE TO EXCESS CALORIES WITH SERIOUS COMORBIDITY AND BODY MASS INDEX (BMI) OF 40.0 TO 44.9 IN ADULT (H): ICD-10-CM

## 2024-11-07 RX ORDER — TIRZEPATIDE 2.5 MG/.5ML
INJECTION, SOLUTION SUBCUTANEOUS
Qty: 2 ML | Refills: 0 | Status: SHIPPED | OUTPATIENT
Start: 2024-11-07

## 2024-11-19 ENCOUNTER — PATIENT OUTREACH (OUTPATIENT)
Dept: FAMILY MEDICINE | Facility: CLINIC | Age: 69
End: 2024-11-19
Payer: COMMERCIAL

## 2024-11-19 NOTE — TELEPHONE ENCOUNTER
Patient Quality Outreach    Patient is due for the following:   Hypertension -  BP check    Action(s) Taken:   Patient has upcoming appointment, these items will be addressed at that time.    Type of outreach:    Chart review performed, no outreach needed.    Questions for provider review:    None           Kate Cardoso MA  Chart routed to Care Team.

## 2024-11-26 ENCOUNTER — OFFICE VISIT (OUTPATIENT)
Dept: FAMILY MEDICINE | Facility: CLINIC | Age: 69
End: 2024-11-26
Payer: COMMERCIAL

## 2024-11-26 VITALS
HEIGHT: 58 IN | HEART RATE: 65 BPM | BODY MASS INDEX: 38.2 KG/M2 | TEMPERATURE: 98.4 F | OXYGEN SATURATION: 98 % | DIASTOLIC BLOOD PRESSURE: 87 MMHG | SYSTOLIC BLOOD PRESSURE: 138 MMHG | RESPIRATION RATE: 16 BRPM | WEIGHT: 182 LBS

## 2024-11-26 DIAGNOSIS — Z91.89 AT RISK FOR OSTEOPOROSIS: ICD-10-CM

## 2024-11-26 DIAGNOSIS — E03.9 ACQUIRED HYPOTHYROIDISM: ICD-10-CM

## 2024-11-26 DIAGNOSIS — Z00.00 WELLNESS EXAMINATION: Primary | ICD-10-CM

## 2024-11-26 DIAGNOSIS — I25.2 HISTORY OF NON-ST ELEVATION MYOCARDIAL INFARCTION (NSTEMI): ICD-10-CM

## 2024-11-26 DIAGNOSIS — Z12.31 VISIT FOR SCREENING MAMMOGRAM: ICD-10-CM

## 2024-11-26 DIAGNOSIS — Z12.11 SCREEN FOR COLON CANCER: ICD-10-CM

## 2024-11-26 DIAGNOSIS — E55.9 VITAMIN D DEFICIENCY: ICD-10-CM

## 2024-11-26 DIAGNOSIS — I25.10 CORONARY ARTERY DISEASE INVOLVING NATIVE CORONARY ARTERY OF NATIVE HEART WITHOUT ANGINA PECTORIS: ICD-10-CM

## 2024-11-26 DIAGNOSIS — F33.1 MODERATE RECURRENT MAJOR DEPRESSION (H): ICD-10-CM

## 2024-11-26 DIAGNOSIS — Z00.00 ENCOUNTER FOR ANNUAL WELLNESS EXAM IN MEDICARE PATIENT: Primary | ICD-10-CM

## 2024-11-26 DIAGNOSIS — Z29.11 NEED FOR VACCINATION AGAINST RESPIRATORY SYNCYTIAL VIRUS: ICD-10-CM

## 2024-11-26 DIAGNOSIS — I10 ESSENTIAL HYPERTENSION: ICD-10-CM

## 2024-11-26 DIAGNOSIS — R39.81 FUNCTIONAL URINARY INCONTINENCE: ICD-10-CM

## 2024-11-26 DIAGNOSIS — N18.31 STAGE 3A CHRONIC KIDNEY DISEASE (H): ICD-10-CM

## 2024-11-26 DIAGNOSIS — Z78.0 POST-MENOPAUSAL: ICD-10-CM

## 2024-11-26 LAB
ANION GAP SERPL CALCULATED.3IONS-SCNC: 6 MMOL/L (ref 3–14)
BUN SERPL-MCNC: 35 MG/DL (ref 7–30)
CALCIUM SERPL-MCNC: 9.8 MG/DL (ref 8.5–10.1)
CHLORIDE BLD-SCNC: 114 MMOL/L (ref 94–109)
CO2 SERPL-SCNC: 22 MMOL/L (ref 20–32)
CREAT SERPL-MCNC: 1.2 MG/DL (ref 0.52–1.04)
CREAT UR-MCNC: 84 MG/DL
EGFRCR SERPLBLD CKD-EPI 2021: 49 ML/MIN/1.73M2
GLUCOSE BLD-MCNC: 109 MG/DL (ref 70–99)
MICROALBUMIN UR-MCNC: <12 MG/L
MICROALBUMIN/CREAT UR: NORMAL MG/G{CREAT}
POTASSIUM BLD-SCNC: 4 MMOL/L (ref 3.4–5.3)
SODIUM SERPL-SCNC: 142 MMOL/L (ref 135–145)

## 2024-11-26 PROCEDURE — 36415 COLL VENOUS BLD VENIPUNCTURE: CPT | Performed by: FAMILY MEDICINE

## 2024-11-26 PROCEDURE — 99214 OFFICE O/P EST MOD 30 MIN: CPT | Mod: 25 | Performed by: FAMILY MEDICINE

## 2024-11-26 PROCEDURE — G0438 PPPS, INITIAL VISIT: HCPCS | Performed by: FAMILY MEDICINE

## 2024-11-26 PROCEDURE — 80048 BASIC METABOLIC PNL TOTAL CA: CPT | Performed by: FAMILY MEDICINE

## 2024-11-26 PROCEDURE — 82570 ASSAY OF URINE CREATININE: CPT | Performed by: FAMILY MEDICINE

## 2024-11-26 PROCEDURE — 99207 PR NO BILLABLE SERVICE THIS VISIT: CPT

## 2024-11-26 PROCEDURE — 82043 UR ALBUMIN QUANTITATIVE: CPT | Performed by: FAMILY MEDICINE

## 2024-11-26 PROCEDURE — 83735 ASSAY OF MAGNESIUM: CPT | Performed by: FAMILY MEDICINE

## 2024-11-26 PROCEDURE — 84100 ASSAY OF PHOSPHORUS: CPT | Performed by: FAMILY MEDICINE

## 2024-11-26 PROCEDURE — 84443 ASSAY THYROID STIM HORMONE: CPT | Performed by: FAMILY MEDICINE

## 2024-11-26 PROCEDURE — 80061 LIPID PANEL: CPT | Performed by: FAMILY MEDICINE

## 2024-11-26 RX ORDER — BUPROPION HYDROCHLORIDE 150 MG/1
150 TABLET ORAL EVERY MORNING
Qty: 30 TABLET | Refills: 1 | Status: SHIPPED | OUTPATIENT
Start: 2024-11-26

## 2024-11-26 RX ORDER — ASPIRIN 81 MG/1
81 TABLET ORAL DAILY
Qty: 90 TABLET | Refills: 4 | Status: SHIPPED | OUTPATIENT
Start: 2024-11-26

## 2024-11-26 SDOH — HEALTH STABILITY: PHYSICAL HEALTH: ON AVERAGE, HOW MANY DAYS PER WEEK DO YOU ENGAGE IN MODERATE TO STRENUOUS EXERCISE (LIKE A BRISK WALK)?: 7 DAYS

## 2024-11-26 ASSESSMENT — ASTHMA QUESTIONNAIRES
QUESTION_5 LAST FOUR WEEKS HOW WOULD YOU RATE YOUR ASTHMA CONTROL: COMPLETELY CONTROLLED
QUESTION_1 LAST FOUR WEEKS HOW MUCH OF THE TIME DID YOUR ASTHMA KEEP YOU FROM GETTING AS MUCH DONE AT WORK, SCHOOL OR AT HOME: NONE OF THE TIME
QUESTION_3 LAST FOUR WEEKS HOW OFTEN DID YOUR ASTHMA SYMPTOMS (WHEEZING, COUGHING, SHORTNESS OF BREATH, CHEST TIGHTNESS OR PAIN) WAKE YOU UP AT NIGHT OR EARLIER THAN USUAL IN THE MORNING: NOT AT ALL
ACT_TOTALSCORE: 25
QUESTION_2 LAST FOUR WEEKS HOW OFTEN HAVE YOU HAD SHORTNESS OF BREATH: NOT AT ALL
ACT_TOTALSCORE: 25
QUESTION_4 LAST FOUR WEEKS HOW OFTEN HAVE YOU USED YOUR RESCUE INHALER OR NEBULIZER MEDICATION (SUCH AS ALBUTEROL): NOT AT ALL

## 2024-11-26 ASSESSMENT — PATIENT HEALTH QUESTIONNAIRE - PHQ9
SUM OF ALL RESPONSES TO PHQ QUESTIONS 1-9: 16
10. IF YOU CHECKED OFF ANY PROBLEMS, HOW DIFFICULT HAVE THESE PROBLEMS MADE IT FOR YOU TO DO YOUR WORK, TAKE CARE OF THINGS AT HOME, OR GET ALONG WITH OTHER PEOPLE: EXTREMELY DIFFICULT
SUM OF ALL RESPONSES TO PHQ QUESTIONS 1-9: 16
10. IF YOU CHECKED OFF ANY PROBLEMS, HOW DIFFICULT HAVE THESE PROBLEMS MADE IT FOR YOU TO DO YOUR WORK, TAKE CARE OF THINGS AT HOME, OR GET ALONG WITH OTHER PEOPLE: EXTREMELY DIFFICULT

## 2024-11-26 ASSESSMENT — PAIN SCALES - GENERAL: PAINLEVEL_OUTOF10: MODERATE PAIN (5)

## 2024-11-26 ASSESSMENT — SOCIAL DETERMINANTS OF HEALTH (SDOH)
HOW OFTEN DO YOU GET TOGETHER WITH FRIENDS OR RELATIVES?: NEVER
HOW OFTEN DO YOU GET TOGETHER WITH FRIENDS OR RELATIVES?: NEVER

## 2024-11-26 NOTE — PROGRESS NOTES
Answers submitted by the patient for this visit:  Patient Health Questionnaire (Submitted on 11/26/2024)  If you checked off any problems, how difficult have these problems made it for you to do your work, take care of things at home, or get along with other people?: Extremely difficult  PHQ9 TOTAL SCORE: 16

## 2024-11-26 NOTE — PROGRESS NOTES
Preventive Care Visit  [facility]  Benjamin Rosenstein, MD, Family Medicine  Nov 26, 2024      Jamee Muhammad is a 69 year old female with pmhx including CAD with hx NSTEMI, BMI > 35, SI joint OA, MDD seen today for the following:     (Z00.00) Encounter for annual wellness exam in Medicare patient  (primary encounter diagnosis)  Comment: Screenings and labs reviewed as below.  CODE STATUS was reviewed as well and wishes to change to DNR/DNI.  Updated in chart.  Plan: No CPR- Do NOT Intubate         (F33.1) Moderate recurrent major depression (H)  Comment:  Significant history of depression, primarily relates this to age.  Would benefit from behavioral therapy but does not wish to pursue this.  Was interested in trial of medication. Start bupropion as may assist with weight loss as well.  She does exercise 3 times a day encouraged her to continue this to assist with her mood as well.  Plan: buPROPion (WELLBUTRIN XL) 150 MG 24 hr tablet          (I25.2) History of non-ST elevation myocardial infarction (NSTEMI)  (I25.10) Coronary artery disease involving native coronary artery of native heart without angina pectoris  Comment: Likely contributing to the above with associated deconditioning and difficulties with prior activities. Continue exercise as above.  Sounds like recently some difficulties obtaining her aspirin, recommended continue taking this daily.  Plan: Lipid panel reflex to direct LDL Fasting,         aspirin 81 MG EC tablet          (Z68.41) Body mass index (BMI) of 40.0-44.9 in adult (H)  Comment: Doing well with returns appetite at current dose.  No changes at this time except addition of bupropion as above.    (R39.81) Functional urinary incontinence  Comment: Notes intermittent urge incontinence related to SI joint.  Will send with incontinence supplies.  Further discussions at follow-up regarding pelvic floor therapy and possibly urogyn given intermittent BR[place]N symptoms as  well.  Plan: Incontinence Supplies Order for DME - ONLY FOR         DME          (I10) Essential hypertension  Comment: Well-controlled on current dose of lisinopril daily.  Recommend close monitoring with addition of bupropion.  Plan: Basic metabolic panel, Magnesium, Phosphorus          (E03.9) Acquired hypothyroidism  Comment: Reevaluate today with recent medication changes.    Plan: TSH with free T4 reflex          (N18.31) Stage 3a chronic kidney disease (H)  Comment: Likely related to hypertension and obesity  Plan: Albumin Random Urine Quantitative with Creat         Ratio          (Z29.11) Need for vaccination against respiratory syncytial virus  Comment: Reviewed, recommended obtaining at pharmacy    (Z12.31) Visit for screening mammogram  Comment: Recommended follow-up from prior exam  Plan: MA Screening Bilateral w/ Kenneth         (Z12.11) Screen for colon cancer  Comment: Agreed to FIT test  Plan: Fecal colorectal cancer screen FIT - Future         (S+30)          (E55.9) Vitamin D deficiency  (Z91.89) At risk for osteoporosis  (Z78.0) Post-menopausal  Comment: Reviewed recommendation, risk factors.   Plan: DEXA HIP/PELVIS/SPINE - Future    Benjamin Rosenstein, MD, MA  [facility]     This note was completed with the assistance of dictation software. Typos and word substitution-errors are expected and unintended.      Destiny Mancuso is a 69 year old, presenting for the following:  No chief complaint on file.            HPI    Health Care Directive  Patient does not have a Health Care Directive: Discussed advance care planning with patient; information given to patient to review.      11/26/2024   General Health   How would you rate your overall physical health? Good   Feel stress (tense, anxious, or unable to sleep) Rather much      (!) STRESS CONCERN      11/26/2024   Nutrition   Diet: Low salt    Low fat/cholesterol    Carbohydrate counting    Breakfast skipped       Multiple  values from one day are sorted in reverse-chronological order         11/26/2024   Exercise   Days per week of moderate/strenous exercise 7 days            11/26/2024   Social Factors   Frequency of gathering with friends or relatives Never   Worry food won't last until get money to buy more Yes   Food not last or not have enough money for food? No   Do you have housing? (Housing is defined as stable permanent housing and does not include staying ouside in a car, in a tent, in an abandoned building, in an overnight shelter, or couch-surfing.) Yes   Are you worried about losing your housing? Yes   Lack of transportation? No   Unable to get utilities (heat,electricity)? No   Want help with housing or utility concern? No      (!) FOOD SECURITY CONCERN PRESENT(!) HOUSING CONCERN PRESENT(!) SOCIAL CONNECTIONS CONCERN      11/26/2024   Fall Risk   Fallen 2 or more times in the past year? No    Trouble with walking or balance? Yes        Patient-reported         11/26/2024   Activities of Daily Living- Home Safety   Needs help with the following daily activites Preparing meals    Housework    Laundry   Safety concerns in the home No grab bars in the bathroom       Multiple values from one day are sorted in reverse-chronological order         11/26/2024   Dental   Dentist two times every year? Yes            11/26/2024   Hearing Screening   Hearing concerns? None of the above            11/26/2024   Driving Risk Screening   Patient/family members have concerns about driving No            11/26/2024   General Alertness/Fatigue Screening   Have you been more tired than usual lately? (!) YES            11/26/2024   Urinary Incontinence Screening   Bothered by leaking urine in past 6 months Yes     Functional incontinence. Difficulty getting to restroom in time related to hip pain. Multiple times in a week. No symptoms of overflow. Occasional symptoms of BR[place]N.         11/26/2024   TB Screening   Were you born outside of the [place]?  "Yes          Today's PHQ-9 Score:       11/26/2024     3:17 PM   PHQ-9 SCORE   PHQ-9 Total Score MyChart 16 (Moderately severe depression)   PHQ-9 Total Score 16        Patient-reported     Longstanding issue. Going back multiple years. Related to \"getting old\" Noted PHQ9 few years ago 18.   Not interested in behavioral therapy. Though may be interested in medications.         11/26/2024   Substance Use   Alcohol more than 3/day or more than 7/wk No   Do you have a current opioid prescription? No   How severe/bad is pain from 1 to 10? 5/10   Do you use any other substances recreationally? No        Social History     Tobacco Use    Smoking status: Never     Passive exposure: Never    Smokeless tobacco: Never   Substance Use Topics    Alcohol use: No    Drug use: No          Mammogram Screening - Mammogram every 1-2 years updated in Health Maintenance based on mutual decision making      History of abnormal Pap smear: No - age 65 or older with adequate negative prior screening test results (3 consecutive negative cytology results, 2 consecutive negative cotesting results, or 2 consecutive negative HrHPV test results within 10 years, with the most recent test occurring within the recommended screening interval for the test used)       ASCVD Risk   The ASCVD Risk score (Leah PERES, et al., 2019) failed to calculate for the following reasons:    Risk score cannot be calculated because patient has a medical history suggesting prior/existing ASCVD    Refilled ASA          Reviewed and updated as needed this visit by Provider       Med Hx  Surg Hx  Fam Hx            Current providers sharing in care for this patient include:  Patient Care Team:  Rosenstein, Benjamin, MD as PCP - General (Family Medicine)  Denisse Liu MD as MD (Cardiovascular Disease)  Rosenstein, Benjamin, MD as Assigned PCP    The following health maintenance items are reviewed in Epic and correct as of today:  Health Maintenance   Topic Date " "Due    DEXA  Never done    ASTHMA ACTION PLAN  Never done    DEPRESSION ACTION PLAN  Never done    RSV VACCINE (1 - Risk 60-74 years 1-dose series) Never done    ADVANCE CARE PLANNING  02/28/2021    MAMMO SCREENING  07/25/2021    MEDICARE ANNUAL WELLNESS VISIT  12/09/2022    COLORECTAL CANCER SCREENING  12/09/2022    COVID-19 Vaccine (5 - 2024-25 season) 09/01/2024    LIPID  09/25/2024    MICROALBUMIN  09/25/2024    ASTHMA CONTROL TEST  05/26/2025    PHQ-9  05/26/2025    TSH W/FREE T4 REFLEX  08/06/2025    BMP  09/19/2025    HEMOGLOBIN  09/19/2025    FALL RISK ASSESSMENT  11/26/2025    GLUCOSE  09/19/2027    DTAP/TDAP/TD IMMUNIZATION (7 - Td or Tdap) 01/26/2034    HEPATITIS C SCREENING  Completed    INFLUENZA VACCINE  Completed    Pneumococcal Vaccine: 65+ Years  Completed    URINALYSIS  Completed    ZOSTER IMMUNIZATION  Completed    HPV IMMUNIZATION  Aged Out    MENINGITIS IMMUNIZATION  Aged Out    RSV MONOCLONAL ANTIBODY  Aged Out        Objective    Exam  /87 (BP Location: Left arm, Patient Position: Sitting, Cuff Size: Adult Regular)   Pulse 65   Temp 98.4  F (36.9  C) (Tympanic)   Resp 16   Ht 1.473 m (4' 10\")   Wt 82.6 kg (182 lb)   SpO2 98%   BMI 38.04 kg/m     Estimated body mass index is 38.04 kg/m  as calculated from the following:    Height as of this encounter: 1.473 m (4' 10\").    Weight as of this encounter: 82.6 kg (182 lb).    Physical Exam  GENERAL: alert and no distress  RESP: lungs clear to auscultation - no rales, rhonchi or wheezes  CV: regular rate and rhythm, normal S1 S2, no S3 or S4, no murmur, click or rub, no peripheral edema  ABDOMEN: soft, nontender, no hepatosplenomegaly, no masses and bowel sounds normal  PSYCH: Mood normal, tearful at times but affect with good range        11/26/2024   Mini Cog   3 Item Recall 2 objects recalled        No cognitive concerns    Signed Electronically by: Benjamin Rosenstein, MD    Answers submitted by the patient for this visit:  Patient " Health Questionnaire (Submitted on 11/26/2024)  If you checked off any problems, how difficult have these problems made it for you to do your work, take care of things at home, or get along with other people?: Extremely difficult  PHQ9 TOTAL SCORE: 16

## 2024-11-26 NOTE — NURSING NOTE
Declined care coord referral, will think about it  Concern about housing  Incontinence of urine, when hips are in pain unable to make to the bathroom  Tried to apply for brief or pads-incontinence  Tingling knees down to feet at nighttime-massage helps, hot water: happens right before bedtime after a shower. When sleeping, leg cramps in the middle of the night-really painful  Ringing and drum sound in left ear  Notes slow moving and thinking of words and then saying something else: this was when asked about moving or speaking slowly during PHQ9  Would like to discuss code status, does not want to be full code. NO CPR NO intubation per patient.

## 2024-11-27 ENCOUNTER — PATIENT OUTREACH (OUTPATIENT)
Dept: CARE COORDINATION | Facility: CLINIC | Age: 69
End: 2024-11-27
Payer: COMMERCIAL

## 2024-11-27 LAB
CHOLEST SERPL-MCNC: 146 MG/DL
FASTING STATUS PATIENT QL REPORTED: ABNORMAL
HDLC SERPL-MCNC: 54 MG/DL
LDLC SERPL CALC-MCNC: 59 MG/DL
MAGNESIUM SERPL-MCNC: 1.9 MG/DL (ref 1.7–2.3)
NONHDLC SERPL-MCNC: 92 MG/DL
PHOSPHATE SERPL-MCNC: 3 MG/DL (ref 2.5–4.5)
TRIGL SERPL-MCNC: 163 MG/DL
TSH SERPL DL<=0.005 MIU/L-ACNC: 2.04 UIU/ML (ref 0.3–4.2)

## 2024-12-02 DIAGNOSIS — E66.01 CLASS 3 SEVERE OBESITY DUE TO EXCESS CALORIES WITH SERIOUS COMORBIDITY AND BODY MASS INDEX (BMI) OF 40.0 TO 44.9 IN ADULT (H): ICD-10-CM

## 2024-12-02 DIAGNOSIS — E66.813 CLASS 3 SEVERE OBESITY DUE TO EXCESS CALORIES WITH SERIOUS COMORBIDITY AND BODY MASS INDEX (BMI) OF 40.0 TO 44.9 IN ADULT (H): ICD-10-CM

## 2024-12-02 NOTE — TELEPHONE ENCOUNTER
Patient used last of medication Thursday 11/28/24. Patient also wondering if she needs to increase the dose. She is maintaining same amount of intake yet weight loss is no longer occurring. She is at the same weight as when she was last weighed in clinic on 11/26/24. Writer to route to PCP to review and adjust dose if indicated.   Samara REYNAGA

## 2024-12-02 NOTE — TELEPHONE ENCOUNTER
Patient has been updated on dosage change. Advised pen may look different. Advised to give self still 0.5mL of the new pen to total 5mg weekly. Samara REYNAGA

## 2024-12-26 ENCOUNTER — OFFICE VISIT (OUTPATIENT)
Dept: FAMILY MEDICINE | Facility: CLINIC | Age: 69
End: 2024-12-26
Payer: COMMERCIAL

## 2024-12-26 VITALS
HEART RATE: 74 BPM | RESPIRATION RATE: 20 BRPM | OXYGEN SATURATION: 98 % | TEMPERATURE: 98.7 F | BODY MASS INDEX: 38.62 KG/M2 | WEIGHT: 184 LBS | HEIGHT: 58 IN | SYSTOLIC BLOOD PRESSURE: 132 MMHG | DIASTOLIC BLOOD PRESSURE: 79 MMHG

## 2024-12-26 DIAGNOSIS — F33.1 MODERATE RECURRENT MAJOR DEPRESSION (H): ICD-10-CM

## 2024-12-26 DIAGNOSIS — I10 ESSENTIAL HYPERTENSION: ICD-10-CM

## 2024-12-26 DIAGNOSIS — J06.9 VIRAL URI WITH COUGH: ICD-10-CM

## 2024-12-26 DIAGNOSIS — E66.813 CLASS 3 SEVERE OBESITY DUE TO EXCESS CALORIES WITH SERIOUS COMORBIDITY AND BODY MASS INDEX (BMI) OF 40.0 TO 44.9 IN ADULT (H): Primary | ICD-10-CM

## 2024-12-26 DIAGNOSIS — I25.10 CORONARY ARTERY DISEASE INVOLVING NATIVE CORONARY ARTERY OF NATIVE HEART WITHOUT ANGINA PECTORIS: ICD-10-CM

## 2024-12-26 DIAGNOSIS — E66.01 CLASS 3 SEVERE OBESITY DUE TO EXCESS CALORIES WITH SERIOUS COMORBIDITY AND BODY MASS INDEX (BMI) OF 40.0 TO 44.9 IN ADULT (H): Primary | ICD-10-CM

## 2024-12-26 RX ORDER — ATORVASTATIN CALCIUM 40 MG/1
40 TABLET, FILM COATED ORAL EVERY EVENING
Qty: 90 TABLET | Refills: 3 | Status: SHIPPED | OUTPATIENT
Start: 2024-12-26

## 2024-12-26 RX ORDER — LISINOPRIL 20 MG/1
20 TABLET ORAL DAILY
Qty: 90 TABLET | Refills: 1 | Status: SHIPPED | OUTPATIENT
Start: 2024-12-26

## 2024-12-26 NOTE — PROGRESS NOTES
Assessment & Plan     Jamee Muhammad is 69 year old female with pmhx inclduing CAD with hx of NSTEMI, BMI > 35, MDD, OA who is seen today for the following    (E66.813,  E66.01,  Z68.41) Class 3 severe obesity due to excess calories with serious comorbidity and body mass index (BMI) of 40.0 to 44.9 in adult (H)  (primary encounter diagnosis)  Comment: Continues on tirzepatide without concern.  Doing well.  Is experiencing expected weight loss.  As she is tolerating doses well, increase to 10 mg weekly.  Follow-up in 4 to 6 weeks for monitoring and labs  Plan: Tirzepatide 10 MG/0.5ML SOAJ          (I25.10) Coronary artery disease involving native coronary artery of native heart without angina pectoris  Comment: No current symptoms.  Refill atorvastatin today.  Plan: atorvastatin (LIPITOR) 40 MG tablet          (I10) Essential hypertension  Comment: Well-controlled.  Has had swelling with amlodipine.  No indication to continue.  Refilled lisinopril which she has been taking for some time  Plan: lisinopril (ZESTRIL) 20 MG tablet          (F33.1) Moderate recurrent major depression (H)  Comment: Improvement in mood with starting bupropion.  Continue at current dose.  Consider increasing dose at follow-up visit pending blood pressures    (J06.9) Viral URI with cough  Comment: Mild symptoms with rhinorrhea and intermittent cough.  Did receive her influenza vaccine this year, has not received most recent COVID-vaccine, though overall relatively minimal symptoms.  Signs and symptoms for which to return reviewed    Benjamin Rosenstein, MD, MA  [facility]     This note was completed with the assistance of dictation software. Typos and word substitution-errors are expected and unintended.      MDM: 2+ chronic cond'ns stable; medication mgmt    The longitudinal plan of care for the diagnosis(es)/condition(s) as documented were addressed during this visit. Due to the added complexity in care, I will continue  "to support Jamee in the subsequent management and with ongoing continuity of care.      Subjective   Jamee is a 69 year old, presenting for the following health issues:  Mood (Follow up), RECHECK (Weight), and Medication Problem (Amlodipine Besylate cause edema on whole body )      12/26/2024     9:29 AM   Additional Questions   Roomed by Paw   Accompanied by self         12/26/2024    Information    services provided? Yes   Language Hmong   Type of interpretation provided Face-to-face    name Jaqui Jordan    Agency [employer]     Cough for last 3-4 days. Feels mucus in chest and coughing. Lots of family around recently with holidays. No fevers or chills. Not too bad.    Mood has been little better recently. Feels generally more comfortable and has lost some weight. Generally feeling better about things.     Weight currently 184 lb. Currently taking tirzepatide 5mg weekly. No concerns. No GI symptoms. Tolerating well. Has noticed improvement in hip pain with decreased weight.     Recently got amlodipine from pharmacy and noticed swelling again when she took it. Has been on lisinopril for many years and tolerating, but needs refill. Also refill fo statin.        Objective    /79   Pulse 74   Temp 98.7  F (37.1  C) (Tympanic)   Resp 20   Ht 1.473 m (4' 10\")   Wt 83.5 kg (184 lb)   LMP  (LMP Unknown)   SpO2 98%   BMI 38.46 kg/m    Body mass index is 38.46 kg/m .  Physical Exam     General: Awake, seated comfortably, appears well and NAD   CV: RRR, normal S1/S2, no murmurs/rubs/gallops  Pulm: Normal WOB, lungs CTAB, no wheezes or crackles, good air movement   GI: Abdomen soft, not tender, not distended, BS normal and active throughout  Neuro: Alert, answering questions appropriately, normal thought processes          Signed Electronically by: Benjamin Rosenstein, MD    "

## 2025-01-02 ENCOUNTER — TELEPHONE (OUTPATIENT)
Dept: FAMILY MEDICINE | Facility: CLINIC | Age: 70
End: 2025-01-02
Payer: COMMERCIAL

## 2025-01-02 NOTE — TELEPHONE ENCOUNTER
Prior Authorization Retail Medication Request    Medication/Dose: Mounjaro 10mg/0.5ml  Diagnosis and ICD code (if different than what is on RX):    New/renewal/insurance change PA/secondary ins. PA:  Previously Tried and Failed:    Rationale:  weight loss    Insurance   Primary: TIN: [id number]  Insurance ID:      Clinic Information  [facility]

## 2025-01-02 NOTE — TELEPHONE ENCOUNTER
KEY THEY STARTED WAS FOR MOUNJARO - BUT IT IS FOR WEIGHT LOSS SO SWITCHED IT TO ZEPBOUND WHICH PATIENT IS ALREADY ON AND PA NOT NEED    Prior Authorization Not Needed per Insurance    Medication:  ZEPBOUND  Insurance Company: [employer] - Phone [phone] Fax [phone]  Expected CoPay: $    Pharmacy Filling the Rx: [id number] - [place], [place] - [address] [facility]  Pharmacy Notified: YES  Patient Notified: INSTRUCTED PHARMACY TO NOTIFY BULMAROTENT WHEN READY FOR

## 2025-01-14 ENCOUNTER — TRANSFERRED RECORDS (OUTPATIENT)
Dept: HEALTH INFORMATION MANAGEMENT | Facility: CLINIC | Age: 70
End: 2025-01-14
Payer: COMMERCIAL

## 2025-01-28 ENCOUNTER — OFFICE VISIT (OUTPATIENT)
Dept: FAMILY MEDICINE | Facility: CLINIC | Age: 70
End: 2025-01-28
Payer: COMMERCIAL

## 2025-01-28 VITALS
DIASTOLIC BLOOD PRESSURE: 95 MMHG | TEMPERATURE: 98.3 F | SYSTOLIC BLOOD PRESSURE: 155 MMHG | OXYGEN SATURATION: 99 % | RESPIRATION RATE: 16 BRPM | WEIGHT: 183.4 LBS | HEIGHT: 59 IN | HEART RATE: 59 BPM | BODY MASS INDEX: 36.97 KG/M2

## 2025-01-28 DIAGNOSIS — R05.8 POST-VIRAL COUGH SYNDROME: ICD-10-CM

## 2025-01-28 DIAGNOSIS — D64.9 NORMOCYTIC ANEMIA: ICD-10-CM

## 2025-01-28 DIAGNOSIS — D72.829 LEUKOCYTOSIS, UNSPECIFIED TYPE: ICD-10-CM

## 2025-01-28 DIAGNOSIS — I10 PRIMARY HYPERTENSION: ICD-10-CM

## 2025-01-28 LAB
ANION GAP SERPL CALCULATED.3IONS-SCNC: 8 MMOL/L (ref 3–14)
BASOPHILS # BLD AUTO: 0 10E3/UL (ref 0–0.2)
BASOPHILS NFR BLD AUTO: 0 %
BUN SERPL-MCNC: 18 MG/DL (ref 7–30)
CALCIUM SERPL-MCNC: 9.2 MG/DL (ref 8.5–10.1)
CHLORIDE BLD-SCNC: 110 MMOL/L (ref 94–109)
CO2 SERPL-SCNC: 23 MMOL/L (ref 20–32)
CREAT SERPL-MCNC: 1.2 MG/DL (ref 0.52–1.04)
EGFRCR SERPLBLD CKD-EPI 2021: 49 ML/MIN/1.73M2
EOSINOPHIL # BLD AUTO: 0 10E3/UL (ref 0–0.7)
EOSINOPHIL NFR BLD AUTO: 0 %
ERYTHROCYTE [DISTWIDTH] IN BLOOD BY AUTOMATED COUNT: 13 % (ref 10–15)
ERYTHROCYTE [DISTWIDTH] IN BLOOD BY AUTOMATED COUNT: 13 % (ref 10–15)
ERYTHROCYTE [SEDIMENTATION RATE] IN BLOOD BY WESTERGREN METHOD: 52 MM/HR (ref 0–30)
GLUCOSE BLD-MCNC: 89 MG/DL (ref 70–99)
HCT VFR BLD AUTO: 34.9 % (ref 35–47)
HCT VFR BLD AUTO: 35.9 % (ref 35–47)
HGB BLD-MCNC: 11.4 G/DL (ref 11.7–15.7)
HGB BLD-MCNC: 11.7 G/DL (ref 11.7–15.7)
IMM GRANULOCYTES # BLD: 0.1 10E3/UL
IMM GRANULOCYTES NFR BLD: 1 %
LYMPHOCYTES # BLD AUTO: 2.1 10E3/UL (ref 0.8–5.3)
LYMPHOCYTES NFR BLD AUTO: 16 %
MCH RBC QN AUTO: 29.5 PG (ref 26.5–33)
MCH RBC QN AUTO: 29.7 PG (ref 26.5–33)
MCHC RBC AUTO-ENTMCNC: 32.6 G/DL (ref 31.5–36.5)
MCHC RBC AUTO-ENTMCNC: 32.7 G/DL (ref 31.5–36.5)
MCV RBC AUTO: 91 FL (ref 78–100)
MCV RBC AUTO: 91 FL (ref 78–100)
MONOCYTES # BLD AUTO: 0.8 10E3/UL (ref 0–1.3)
MONOCYTES NFR BLD AUTO: 6 %
NEUTROPHILS # BLD AUTO: 10.3 10E3/UL (ref 1.6–8.3)
NEUTROPHILS NFR BLD AUTO: 77 %
PLATELET # BLD AUTO: 194 10E3/UL (ref 150–450)
PLATELET # BLD AUTO: 200 10E3/UL (ref 150–450)
POTASSIUM BLD-SCNC: 3.8 MMOL/L (ref 3.4–5.3)
RBC # BLD AUTO: 3.84 10E6/UL (ref 3.8–5.2)
RBC # BLD AUTO: 3.96 10E6/UL (ref 3.8–5.2)
SODIUM SERPL-SCNC: 141 MMOL/L (ref 135–145)
WBC # BLD AUTO: 12.5 10E3/UL (ref 4–11)
WBC # BLD AUTO: 13.3 10E3/UL (ref 4–11)

## 2025-01-28 NOTE — PROGRESS NOTES
Assessment & Plan     Jamee Muhammad is a 69 year old female with pmhx including CAD with hx of NSTEMI, MDD, OA (SI joints), BMI > 35 who presents for the following    (Z68.41) Body mass index (BMI) of 40.0-44.9 in adult (H)  (primary encounter diagnosis)  Comment: Limited response at this point to increase dosage for his appetite.  However his main limited activity changes primarily related to SI joint arthritis.  Discussed exercises which may limit pain including stationary cycling, hand cycle, or swimming.  Notably she used to swim more frequently and seemed interested in resuming.  Will increase dose which was appetite.  Follow-up in 6 to 8 weeks.  Plan: Basic metabolic panel, tirzepatide-Weight         Management (ZEPBOUND) 15 MG/0.5ML prefilled pen          (I10) Primary hypertension  Comment: Elevated today, related to the above.  Additionally has started on bupropion for mood which may be contributing.  No changes at this time, but if continues to be elevated, likely will need additional agent.    (D72.829) Leukocytosis, unspecified type  Comment: Remains mildly elevated, etiology unclear.  No associated infectious symptoms except cough, addressed below.  Evaluate for possible inflammatory cause  Plan: CBC with platelets, Erythrocyte sedimentation         rate auto, C-Reactive Protein, CBC with         Platelets & Differential, Ferritin          (D64.9) Normocytic anemia  Comment: As above  Plan: Ferritin          (R05.8) Post-viral cough syndrome  Comment: Reassurance provided.  Unlikely this represents strep throat given symptoms and low prevalence in her age group.  Did have viral URI type symptoms when seen previously with now post-viral cough    Benjamin Rosenstein, MD, MA  [facility]     This note was completed with the assistance of dictation software. Typos and word substitution-errors are expected and unintended.        I spent a total of 32 minutes on the day of the visit.   Time  "spent by me today doing chart review, history and exam, documentation and further activities per the note    The longitudinal plan of care for the diagnosis(es)/condition(s) as documented were addressed during this visit. Due to the added complexity in care, I will continue to support Jamee in the subsequent management and with ongoing continuity of care.      Subjective   Jamee is a 69 year old, presenting for the following health issues:  RECHECK (Follow up weight loss) and Cough (Cough, pt wants to be tested for strep throat)    HPI     Concern about strep with cough. Grand kids recently with strep. However, did have viral uri when seen previously. No other symptoms. Worse with talking and cold air.     Continues on tirzepatide 10mg weekly for weight loss. Tolerating well. Does try to do exercise but says is limited do to hip pain. Primarily range of motion movements. Discussed and review options.         Objective    BP (!) 155/95   Pulse 59   Temp 98.3  F (36.8  C) (Oral)   Resp 16   Ht 1.486 m (4' 10.5\")   Wt 83.2 kg (183 lb 6.4 oz)   LMP  (LMP Unknown)   SpO2 99%   BMI 37.68 kg/m    Body mass index is 37.68 kg/m .  Physical Exam     General: Awake, seated comfortably, appears well and NAD   CV: RRR, normal S1/S2, no murmurs/rubs/gallops  Pulm: Normal WOB, lungs CTAB, no wheezes or crackles, good air movement   Neuro: Alert, answering questions appropriately, normal thought processes; Normal Gait     Results for orders placed or performed in visit on 01/28/25   Basic metabolic panel     Status: Abnormal   Result Value Ref Range    Sodium 141 135 - 145 mmol/L    Potassium 3.8 3.4 - 5.3 mmol/L    Chloride 110 (H) 94 - 109 mmol/L    Carbon Dioxide (CO2) 23 20 - 32 mmol/L    Anion Gap 8 3 - 14 mmol/L    Urea Nitrogen 18 7 - 30 mg/dL    Creatinine 1.20 (H) 0.52 - 1.04 mg/dL    GFR Estimate 49 (L) >60 mL/min/1.73m2    Calcium 9.2 8.5 - 10.1 mg/dL    Glucose 89 70 - 99 mg/dL   CBC with platelets     Status: " Abnormal   Result Value Ref Range    WBC Count 12.5 (H) 4.0 - 11.0 10e3/uL    RBC Count 3.84 3.80 - 5.20 10e6/uL    Hemoglobin 11.4 (L) 11.7 - 15.7 g/dL    Hematocrit 34.9 (L) 35.0 - 47.0 %    MCV 91 78 - 100 fL    MCH 29.7 26.5 - 33.0 pg    MCHC 32.7 31.5 - 36.5 g/dL    RDW 13.0 10.0 - 15.0 %    Platelet Count 194 150 - 450 10e3/uL   CBC with Platelets & Differential     Status: None ()    Narrative    The following orders were created for panel order CBC with Platelets & Differential.  Procedure                               Abnormality         Status                     ---------                               -----------         ------                     CBC with platelets and d...[[id number]]                                                   Please view results for these tests on the individual orders.              Signed Electronically by: Benjamin Rosenstein, MD

## 2025-01-29 ENCOUNTER — APPOINTMENT (OUTPATIENT)
Dept: INTERPRETER SERVICES | Facility: CLINIC | Age: 70
End: 2025-01-29
Payer: COMMERCIAL

## 2025-01-29 DIAGNOSIS — D72.829 LEUKOCYTOSIS, UNSPECIFIED TYPE: Primary | ICD-10-CM

## 2025-01-29 LAB
CRP SERPL-MCNC: 4.81 MG/L
FERRITIN SERPL-MCNC: 155 NG/ML (ref 11–328)

## 2025-01-30 ENCOUNTER — LAB (OUTPATIENT)
Dept: LAB | Facility: CLINIC | Age: 70
End: 2025-01-30
Payer: COMMERCIAL

## 2025-01-30 DIAGNOSIS — D72.829 LEUKOCYTOSIS, UNSPECIFIED TYPE: ICD-10-CM

## 2025-01-30 LAB
RHEUMATOID FACT SERPL-ACNC: <10 IU/ML
TOTAL PROTEIN SERUM FOR ELP: 6.2 G/DL (ref 6.4–8.3)
URATE SERPL-MCNC: 9 MG/DL (ref 2.4–5.7)

## 2025-02-04 ENCOUNTER — APPOINTMENT (OUTPATIENT)
Dept: INTERPRETER SERVICES | Facility: CLINIC | Age: 70
End: 2025-02-04
Payer: COMMERCIAL

## 2025-02-04 DIAGNOSIS — D72.829 LEUKOCYTOSIS, UNSPECIFIED TYPE: ICD-10-CM

## 2025-02-04 DIAGNOSIS — E79.0 ELEVATED URIC ACID IN BLOOD: ICD-10-CM

## 2025-02-04 DIAGNOSIS — D64.9 NORMOCYTIC ANEMIA: Primary | ICD-10-CM

## 2025-02-04 RX ORDER — COLCHICINE 0.6 MG/1
0.6 TABLET ORAL DAILY
Qty: 30 TABLET | Refills: 1 | Status: SHIPPED | OUTPATIENT
Start: 2025-02-04

## 2025-02-04 RX ORDER — ALLOPURINOL 100 MG/1
50 TABLET ORAL DAILY
Qty: 30 TABLET | Refills: 1 | Status: SHIPPED | OUTPATIENT
Start: 2025-02-04

## 2025-02-11 DIAGNOSIS — E03.9 ACQUIRED HYPOTHYROIDISM: ICD-10-CM

## 2025-02-11 RX ORDER — LEVOTHYROXINE SODIUM 50 UG/1
50 TABLET ORAL DAILY
Qty: 90 TABLET | Refills: 0 | Status: SHIPPED | OUTPATIENT
Start: 2025-02-11

## 2025-03-04 RX ORDER — TIRZEPATIDE 15 MG/.5ML
INJECTION, SOLUTION SUBCUTANEOUS
Qty: 2 ML | Refills: 0 | Status: SHIPPED | OUTPATIENT
Start: 2025-03-04

## 2025-03-11 ENCOUNTER — OFFICE VISIT (OUTPATIENT)
Dept: FAMILY MEDICINE | Facility: CLINIC | Age: 70
End: 2025-03-11
Payer: COMMERCIAL

## 2025-03-11 VITALS
RESPIRATION RATE: 16 BRPM | BODY MASS INDEX: 38.41 KG/M2 | WEIGHT: 183 LBS | HEART RATE: 77 BPM | DIASTOLIC BLOOD PRESSURE: 79 MMHG | SYSTOLIC BLOOD PRESSURE: 149 MMHG | OXYGEN SATURATION: 99 % | HEIGHT: 58 IN | TEMPERATURE: 98 F

## 2025-03-11 DIAGNOSIS — D72.829 LEUKOCYTOSIS, UNSPECIFIED TYPE: ICD-10-CM

## 2025-03-11 DIAGNOSIS — Z91.89 AT RISK FOR OSTEOPOROSIS: ICD-10-CM

## 2025-03-11 DIAGNOSIS — E79.0 ELEVATED URIC ACID IN BLOOD: ICD-10-CM

## 2025-03-11 DIAGNOSIS — M46.1 SI JOINT ARTHRITIS: ICD-10-CM

## 2025-03-11 DIAGNOSIS — E66.812 CLASS 2 DRUG-INDUCED OBESITY WITHOUT SERIOUS COMORBIDITY WITH BODY MASS INDEX (BMI) OF 38.0 TO 38.9 IN ADULT: Primary | ICD-10-CM

## 2025-03-11 DIAGNOSIS — R53.81 PHYSICAL DECONDITIONING: ICD-10-CM

## 2025-03-11 DIAGNOSIS — N18.31 STAGE 3A CHRONIC KIDNEY DISEASE (H): ICD-10-CM

## 2025-03-11 DIAGNOSIS — I10 PRIMARY HYPERTENSION: ICD-10-CM

## 2025-03-11 DIAGNOSIS — E66.1 CLASS 2 DRUG-INDUCED OBESITY WITHOUT SERIOUS COMORBIDITY WITH BODY MASS INDEX (BMI) OF 38.0 TO 38.9 IN ADULT: Primary | ICD-10-CM

## 2025-03-11 PROBLEM — E66.01 CLASS 2 SEVERE OBESITY DUE TO EXCESS CALORIES WITH SERIOUS COMORBIDITY IN ADULT (H): Status: RESOLVED | Noted: 2024-06-24 | Resolved: 2025-03-11

## 2025-03-11 LAB
ANION GAP SERPL CALCULATED.3IONS-SCNC: 7 MMOL/L (ref 3–14)
BUN SERPL-MCNC: 29 MG/DL (ref 7–30)
CALCIUM SERPL-MCNC: 9.2 MG/DL (ref 8.5–10.1)
CHLORIDE BLD-SCNC: 112 MMOL/L (ref 94–109)
CO2 SERPL-SCNC: 20 MMOL/L (ref 20–32)
CREAT SERPL-MCNC: 1.2 MG/DL (ref 0.52–1.04)
EGFRCR SERPLBLD CKD-EPI 2021: 49 ML/MIN/1.73M2
ERYTHROCYTE [DISTWIDTH] IN BLOOD BY AUTOMATED COUNT: 13.7 % (ref 10–15)
GLUCOSE BLD-MCNC: 110 MG/DL (ref 70–99)
HCT VFR BLD AUTO: 37.6 % (ref 35–47)
HGB BLD-MCNC: 11.8 G/DL (ref 11.7–15.7)
MCH RBC QN AUTO: 29.5 PG (ref 26.5–33)
MCHC RBC AUTO-ENTMCNC: 31.4 G/DL (ref 31.5–36.5)
MCV RBC AUTO: 94 FL (ref 78–100)
PLATELET # BLD AUTO: 190 10E3/UL (ref 150–450)
POTASSIUM BLD-SCNC: 4.9 MMOL/L (ref 3.4–5.3)
RBC # BLD AUTO: 4 10E6/UL (ref 3.8–5.2)
SODIUM SERPL-SCNC: 139 MMOL/L (ref 135–145)
WBC # BLD AUTO: 8.2 10E3/UL (ref 4–11)

## 2025-03-11 RX ORDER — ALLOPURINOL 100 MG/1
100 TABLET ORAL DAILY
COMMUNITY
Start: 2025-03-11

## 2025-03-11 NOTE — PROGRESS NOTES
"  Assessment & Plan     Jamee Muhammad is a 70 year old female with a personal history that includes CAD (w/ hx of STEMI), BMI >35, sacroiliac joint osteoarthritis, and MDD. Seen today for the following:    # BMI > 35 (Previously BMI 40.0 - 44.9)  #Deconditioning  Currently at maximum dosage of medication, without side effects. She endorses minor improvement with treatment. No significant change in weight since last visit. Reviewed additional measures as part of overall weight-loss plan.  Estimated body mass index is 38.92 kg/m  as calculated from the following:    Height as of this encounter: 1.461 m (4' 9.5\").    Weight as of this encounter: 83 kg (183 lb).    - Continue: Tirzepatide 15mg weekly    #SI Joint arthritis  # Osteoarthritis, hands  # Possible gout  Elevated Uric acid and pain along metatarsal-phalange interphalangeal joints of the right and left hands. Erythema localizes largely to dorsal surface overlying aforementioned joint spaces. Some mild symptomatic improvement after initial treatment at last visit.  - Continue: Colchicine  - Increase: Allopurinol -> 100mg once daily    -PT referral    # Leukocytosis, resolved  Sustained leukocytosis over last ~7 months (~13,000). Now normalized at today's visit. May be related to treatment of assumed gout that was previously untreated. Most recent abnormal labs without left shift, neoplastic leukocytes, or other findings. Afebrile, without concerning signs symptoms on exam. Likely plan for recheck at next visit to confirm downtrend / continued normalization.    # History of non-ST elevation myocardial infarction (\"NSTEMI\"; 09/2023)  # Coronary Artery Disease involving native coronary artery of native heart without angina pectoris  Likely contributory to associated deconditioning and difficulties with prior activities. Continue exercise as tolerated. No changes made today.   - Continue: Aspirin    # Essential Hypertension  Close monitoring recommended following added " "Bupropion.    # Stage 3A Chronic Kidney Disease  Likely related to hypertension and obesity. Serum creatinine unchanged at last three visits (~1.2). Continue to monitor with increase of Allopurinol dosing.    # At-risk for osteoporosis  # Post-menopausal  # Vitamin D deficiency  Previously discussed DEXA scan for evaluation of structure. Continued SI joint pain (and bilateral knees, bilateral hands)    I was present with the medical student Rashmi Guillaume who participated in the service and in the documentation of this note. I have verified the history and personally performed the physical exam and medical decision making, and have verified the content of the note, with my additions, which accurately reflects my assessment of the patient and the plan of care.     Benjamin Rosenstein, MD, MA  [facility] [place]     MDM: 2+ chronic conditions, stable; medication mgmt    Subjective   Jamee is a 70 year old, presenting for the following health issues:  RECHECK (Follow up visit/)        12/26/2024     9:29 AM   Additional Questions   Roomed by Paw   Accompanied by self     HPI      Review of Systems  CONSTITUTIONAL: NEGATIVE for fever, chills, change in weight  ENT/MOUTH: NEGATIVE for ear, mouth and throat problems  RESP: NEGATIVE for significant cough or SOB  CV: NEGATIVE for chest pain, palpitations or peripheral edema  : NEGATIVE for dysuria, frequency, or urgency  MUSCULOSKELETAL: POSITIVE  for joint pain, joint stiffness, and joint swelling of the bilateral joints of the hand, bilateral SI joints, and bilateral knee joints.  ROS otherwise negative      Objective    BP (!) 149/79   Pulse 77   Temp 98  F (36.7  C) (Oral)   Resp 16   Ht 1.461 m (4' 9.5\")   Wt 83 kg (183 lb)   SpO2 99%   BMI 38.92 kg/m    Body mass index is 38.92 kg/m .    Physical Exam   GENERAL: alert and no distress  EYES: Eyes grossly normal to inspection, conjunctivae and sclerae normal  NECK: no adenopathy, no asymmetry, " masses  RESP: No apparent respiratory distress - without tachypnea.  CV: regular rate and rhythm, no peripheral edema  MS: decreased range of motion bilateral digits of the hand, no UES edema, tenderness to palpation to joints of digits of the bilateral hands  SKIN: erythema - fingers      Rashmi Guillaume, Medical Student  Signed Electronically by: Benjamin Rosenstein, MD

## 2025-03-12 ENCOUNTER — APPOINTMENT (OUTPATIENT)
Dept: INTERPRETER SERVICES | Facility: CLINIC | Age: 70
End: 2025-03-12
Payer: COMMERCIAL

## 2025-03-27 DIAGNOSIS — I10 ESSENTIAL HYPERTENSION: ICD-10-CM

## 2025-03-27 RX ORDER — LISINOPRIL 20 MG/1
20 TABLET ORAL DAILY
Qty: 90 TABLET | Refills: 1 | Status: SHIPPED | OUTPATIENT
Start: 2025-03-27

## 2025-03-27 NOTE — TELEPHONE ENCOUNTER
Outside RN standing orders due to no protocol for zepbound and most recent BP out of range. Routing to PCP for review and fill. Samara REYNAGA

## 2025-04-22 ENCOUNTER — OFFICE VISIT (OUTPATIENT)
Dept: FAMILY MEDICINE | Facility: CLINIC | Age: 70
End: 2025-04-22
Payer: COMMERCIAL

## 2025-04-22 VITALS
RESPIRATION RATE: 18 BRPM | DIASTOLIC BLOOD PRESSURE: 100 MMHG | SYSTOLIC BLOOD PRESSURE: 167 MMHG | BODY MASS INDEX: 39.48 KG/M2 | HEART RATE: 77 BPM | WEIGHT: 183 LBS | TEMPERATURE: 97.4 F | HEIGHT: 57 IN | OXYGEN SATURATION: 98 %

## 2025-04-22 DIAGNOSIS — I10 ESSENTIAL HYPERTENSION: ICD-10-CM

## 2025-04-22 DIAGNOSIS — I25.10 CORONARY ARTERY DISEASE INVOLVING NATIVE CORONARY ARTERY OF NATIVE HEART WITHOUT ANGINA PECTORIS: ICD-10-CM

## 2025-04-22 DIAGNOSIS — I25.2 HISTORY OF NON-ST ELEVATION MYOCARDIAL INFARCTION (NSTEMI): ICD-10-CM

## 2025-04-22 DIAGNOSIS — F33.1 MODERATE RECURRENT MAJOR DEPRESSION (H): ICD-10-CM

## 2025-04-22 RX ORDER — AMLODIPINE BESYLATE 10 MG/1
TABLET ORAL
COMMUNITY
Start: 2024-10-16

## 2025-04-22 RX ORDER — ASPIRIN 81 MG/1
81 TABLET ORAL DAILY
Qty: 90 TABLET | Refills: 4 | Status: SHIPPED | OUTPATIENT
Start: 2025-04-22

## 2025-04-22 RX ORDER — HYDROCHLOROTHIAZIDE 12.5 MG/1
12.5 TABLET ORAL DAILY
Qty: 30 TABLET | Refills: 1 | Status: SHIPPED | OUTPATIENT
Start: 2025-04-22

## 2025-04-22 RX ORDER — BUPROPION HYDROCHLORIDE 150 MG/1
150 TABLET ORAL EVERY MORNING
Qty: 30 TABLET | Refills: 1 | Status: SHIPPED | OUTPATIENT
Start: 2025-04-22

## 2025-04-22 ASSESSMENT — PATIENT HEALTH QUESTIONNAIRE - PHQ9
SUM OF ALL RESPONSES TO PHQ QUESTIONS 1-9: 2
SUM OF ALL RESPONSES TO PHQ QUESTIONS 1-9: 2
10. IF YOU CHECKED OFF ANY PROBLEMS, HOW DIFFICULT HAVE THESE PROBLEMS MADE IT FOR YOU TO DO YOUR WORK, TAKE CARE OF THINGS AT HOME, OR GET ALONG WITH OTHER PEOPLE: SOMEWHAT DIFFICULT

## 2025-04-22 NOTE — PROGRESS NOTES
Assessment & Plan     Jamee Muhammad is a 70 year old female with pmhx including CAD w/NSTEMI, MDD, OA, BMI > 35 seen today for the follwoing    (Z68.41) Body mass index (BMI) of 40.0-44.9 in adult (H)  (primary encounter diagnosis)  Comment: Continues on 15 mg weekly with limited response so far.  Again reviewed making dietary and activity modifications as well.  Particularly in the context of  hypertension, reviewed the DASH diet.  Plan: tirzepatide-Weight Management (ZEPBOUND) 15         MG/0.5ML prefilled pen          (I10) Essential hypertension  Comment: Significantly elevated today, and has been elevated  consistently at recent visits.  Will start hydrochlorothiazide, potentially transition to combo pill.  Plan: hydroCHLOROthiazide 12.5 MG tablet          (I25.2) History of non-ST elevation myocardial infarction (NSTEMI)  (I25.10) Coronary artery disease involving native coronary artery of native heart without angina pectoris  Comment: No chest pain or shortness of breath.  Refilled aspirin today.  Plan: aspirin 81 MG EC tablet          (F33.1) Moderate recurrent major depression (H)  Comment: Reviewed bupropion may be contributing to elevated blood pressures, though appears may have not been taking it recently. Refilled  Plan: buPROPion (WELLBUTRIN XL) 150 MG 24 hr tablet          Benjamin Rosenstein, MD, MA  [facility] Faculty     This note was completed with the assistance of dictation software. Typos and word substitution-errors are expected and unintended.      MDM: Chronic cond'n in exacerbation; med mgmt.       Destiny Mancuso is a 70 year old, presenting for the following health issues:  Follow Up (Medication follow up. Zepbound is not helping. )      4/22/2025     2:02 PM   Additional Questions   Roomed by Hser   Accompanied by Self         4/22/2025    Information    services provided? Yes   Language Hmong   Type of interpretation provided Face-to-face     "name Edith Moody    Agency [employer]     HPI      Here today to follow-up on weight loss management.  Also noted to have significantly elevated blood pressures.    She reports she ate something very salty this morning and that is why her blood pressure is high.  She has no chest pain, shortness of breath, headaches, change in vision.  She does take amlodipine and lisinopril daily.  Denies any missed doses.  However notes took it 30 minutes before coming here.    She continues to use tirzepatide weekly.  Tolerating well.  However so far minimal weight loss.  She has been walking more however.  Says medicines for gout seem to be assisting with her hip pain.  Gradually increasing her walking distance and she goes for walks daily.  We did discuss dietary modifications as well.          Objective    BP (!) 167/100   Pulse 77   Temp 97.4  F (36.3  C) (Oral)   Resp 18   Ht 1.46 m (4' 9.48\")   Wt 83 kg (183 lb)   SpO2 98%   BMI 38.94 kg/m    Body mass index is 38.94 kg/m .  Physical Exam     General: Awake, seated comfortably, appears well and NAD  CV: RRR, normal S1/S2, no murmurs/rubs/gallops  Pulm: Normal WOB, lungs CTAB, no wheezes or crackles  GI: Abdomen soft, not tender, not distended, BS normal and active throughout   Neuro: Alert, answering questions appropriately, normal thought processes; Normal Gait     Office Visit on 03/11/2025   Component Date Value Ref Range Status    Sodium 03/11/2025 139  135 - 145 mmol/L Final    Potassium 03/11/2025 4.9  3.4 - 5.3 mmol/L Final    Chloride 03/11/2025 112 (H)  94 - 109 mmol/L Final    Carbon Dioxide (CO2) 03/11/2025 20  20 - 32 mmol/L Final    Anion Gap 03/11/2025 7  3 - 14 mmol/L Final    Urea Nitrogen 03/11/2025 29  7 - 30 mg/dL Final    Creatinine 03/11/2025 1.20 (H)  0.52 - 1.04 mg/dL Final    GFR Estimate 03/11/2025 49 (L)  >60 mL/min/1.73m2 Final    Calcium 03/11/2025 9.2  8.5 - 10.1 mg/dL Final    Glucose 03/11/2025 110 (H)  70 - 99 mg/dL Final    WBC " Count 03/11/2025 8.2  4.0 - 11.0 10e3/uL Final    RBC Count 03/11/2025 4.00  3.80 - 5.20 10e6/uL Final    Hemoglobin 03/11/2025 11.8  11.7 - 15.7 g/dL Final    Hematocrit 03/11/2025 37.6  35.0 - 47.0 % Final    MCV 03/11/2025 94  78 - 100 fL Final    MCH 03/11/2025 29.5  26.5 - 33.0 pg Final    MCHC 03/11/2025 31.4 (L)  31.5 - 36.5 g/dL Final    RDW 03/11/2025 13.7  10.0 - 15.0 % Final    Platelet Count 03/11/2025 190  150 - 450 10e3/uL Final           Signed Electronically by: Benjamin Rosenstein, MD

## 2025-05-05 ENCOUNTER — TELEPHONE (OUTPATIENT)
Dept: FAMILY MEDICINE | Facility: CLINIC | Age: 70
End: 2025-05-05
Payer: COMMERCIAL

## 2025-05-05 NOTE — TELEPHONE ENCOUNTER
Medication Question or Refill        What medication are you calling about (include dose and sig)?:     tirzepatide-Weight Management (ZEPBOUND) 15 MG/0.5ML prefilled pen     Preferred Pharmacy:       [id number] - [place], [place] - [address]  [address] [address]  Phone: [phone] Fax: [phone]      Controlled Substance Agreement on file:   CSA -- Patient Level:    CSA: None found at the patient level.       Who prescribed the medication?:     Do you need a refill? No    When did you use the medication last?     Patient offered an appointment? No    Do you have any questions or concerns?  Yes: This medication is needing a PA placed, please review and place thank you.      Okay to leave a detailed message?: Yes at Cell number on file:    Telephone Information:   Mobile [phone]

## 2025-05-06 NOTE — TELEPHONE ENCOUNTER
Prior Authorization Retail Medication Request    Medication/Dose: Zepbound 15mg 0.5ml  Diagnosis and ICD code (if different than what is on RX):    New/renewal/insurance change PA/secondary ins. PA:  Previously Tried and Failed:    Rationale:      Insurance  Primary: CMM: [id number]    Clinic Information  Preferred routing pool for dept communication: [facility]

## 2025-05-07 NOTE — TELEPHONE ENCOUNTER
PA Initiation    Medication: ZEPBOUND 15 MG/0.5ML SC SOAJ  Insurance Company: [employer] - Phone [phone] Fax [phone]  Pharmacy Filling the Rx: [id number] [place], [place] - [address]  Filling Pharmacy Phone: [phone]  Filling Pharmacy Fax: [phone]  Start Date: 5/7/2025    Called [employer] to verify and a pa is needed. Started over the phone

## 2025-05-12 NOTE — TELEPHONE ENCOUNTER
PRIOR AUTHORIZATION DENIED    Medication: ZEPBOUND 15 MG/0.5ML SC SOAJ  Insurance Company: [employer] - Phone [phone] Fax [phone]  Denial Date: 5/9/2025  Denial Reason(s): Excluded from coverage  Appeal Information: NA per call to plan  Patient Notified: NO

## 2025-05-13 ENCOUNTER — OFFICE VISIT (OUTPATIENT)
Dept: FAMILY MEDICINE | Facility: CLINIC | Age: 70
End: 2025-05-13
Payer: COMMERCIAL

## 2025-05-13 ENCOUNTER — RESULTS FOLLOW-UP (OUTPATIENT)
Dept: FAMILY MEDICINE | Facility: CLINIC | Age: 70
End: 2025-05-13

## 2025-05-13 VITALS
RESPIRATION RATE: 18 BRPM | TEMPERATURE: 97.7 F | BODY MASS INDEX: 37.81 KG/M2 | DIASTOLIC BLOOD PRESSURE: 90 MMHG | HEART RATE: 70 BPM | WEIGHT: 180.12 LBS | HEIGHT: 58 IN | OXYGEN SATURATION: 98 % | SYSTOLIC BLOOD PRESSURE: 173 MMHG

## 2025-05-13 DIAGNOSIS — I10 ESSENTIAL HYPERTENSION: Primary | ICD-10-CM

## 2025-05-13 DIAGNOSIS — E03.9 ACQUIRED HYPOTHYROIDISM: ICD-10-CM

## 2025-05-13 DIAGNOSIS — N18.31 STAGE 3A CHRONIC KIDNEY DISEASE (H): ICD-10-CM

## 2025-05-13 DIAGNOSIS — M1A.09X0 IDIOPATHIC CHRONIC GOUT OF MULTIPLE SITES WITHOUT TOPHUS: ICD-10-CM

## 2025-05-13 LAB
ANION GAP SERPL CALCULATED.3IONS-SCNC: 4 MMOL/L (ref 3–14)
BUN SERPL-MCNC: 49 MG/DL (ref 7–30)
CALCIUM SERPL-MCNC: 9.8 MG/DL (ref 8.5–10.1)
CHLORIDE BLD-SCNC: 116 MMOL/L (ref 94–109)
CO2 SERPL-SCNC: 21 MMOL/L (ref 20–32)
CREAT SERPL-MCNC: 1.3 MG/DL (ref 0.52–1.04)
EGFRCR SERPLBLD CKD-EPI 2021: 45 ML/MIN/1.73M2
GLUCOSE BLD-MCNC: 116 MG/DL (ref 70–99)
POTASSIUM BLD-SCNC: 4.3 MMOL/L (ref 3.4–5.3)
SODIUM SERPL-SCNC: 141 MMOL/L (ref 135–145)

## 2025-05-13 PROCEDURE — 36415 COLL VENOUS BLD VENIPUNCTURE: CPT | Performed by: FAMILY MEDICINE

## 2025-05-13 PROCEDURE — 80048 BASIC METABOLIC PNL TOTAL CA: CPT | Performed by: FAMILY MEDICINE

## 2025-05-13 PROCEDURE — 3079F DIAST BP 80-89 MM HG: CPT | Performed by: FAMILY MEDICINE

## 2025-05-13 PROCEDURE — 3077F SYST BP >= 140 MM HG: CPT | Performed by: FAMILY MEDICINE

## 2025-05-13 PROCEDURE — 99214 OFFICE O/P EST MOD 30 MIN: CPT | Performed by: FAMILY MEDICINE

## 2025-05-13 RX ORDER — ALLOPURINOL 100 MG/1
100 TABLET ORAL DAILY
Qty: 90 TABLET | Refills: 1 | Status: SHIPPED | OUTPATIENT
Start: 2025-05-13

## 2025-05-13 RX ORDER — LEVOTHYROXINE SODIUM 50 UG/1
50 TABLET ORAL DAILY
Qty: 90 TABLET | Refills: 1 | Status: SHIPPED | OUTPATIENT
Start: 2025-05-13

## 2025-05-13 RX ORDER — LISINOPRIL 20 MG/1
20 TABLET ORAL 2 TIMES DAILY
Qty: 90 TABLET | Refills: 1 | Status: SHIPPED | OUTPATIENT
Start: 2025-05-13

## 2025-05-13 ASSESSMENT — ASTHMA QUESTIONNAIRES
QUESTION_5 LAST FOUR WEEKS HOW WOULD YOU RATE YOUR ASTHMA CONTROL: COMPLETELY CONTROLLED
QUESTION_3 LAST FOUR WEEKS HOW OFTEN DID YOUR ASTHMA SYMPTOMS (WHEEZING, COUGHING, SHORTNESS OF BREATH, CHEST TIGHTNESS OR PAIN) WAKE YOU UP AT NIGHT OR EARLIER THAN USUAL IN THE MORNING: NOT AT ALL
ACT_TOTALSCORE: 25
QUESTION_2 LAST FOUR WEEKS HOW OFTEN HAVE YOU HAD SHORTNESS OF BREATH: NOT AT ALL
QUESTION_1 LAST FOUR WEEKS HOW MUCH OF THE TIME DID YOUR ASTHMA KEEP YOU FROM GETTING AS MUCH DONE AT WORK, SCHOOL OR AT HOME: NONE OF THE TIME
QUESTION_4 LAST FOUR WEEKS HOW OFTEN HAVE YOU USED YOUR RESCUE INHALER OR NEBULIZER MEDICATION (SUCH AS ALBUTEROL): NOT AT ALL

## 2025-05-13 ASSESSMENT — PATIENT HEALTH QUESTIONNAIRE - PHQ9
SUM OF ALL RESPONSES TO PHQ QUESTIONS 1-9: 2
10. IF YOU CHECKED OFF ANY PROBLEMS, HOW DIFFICULT HAVE THESE PROBLEMS MADE IT FOR YOU TO DO YOUR WORK, TAKE CARE OF THINGS AT HOME, OR GET ALONG WITH OTHER PEOPLE: SOMEWHAT DIFFICULT
SUM OF ALL RESPONSES TO PHQ QUESTIONS 1-9: 2

## 2025-05-13 NOTE — PROGRESS NOTES
Assessment & Plan     Jamee Muhammad is a 70 year old female with pmhx including CAD w/prior NSTEMI, BMI > 35, MDD seen today for the following:     (I10) Essential hypertension  (primary encounter diagnosis)  Comment: Blood pressures remain elevated.  Did not tolerate hydrochlorothiazide.  Suspect would not tolerate other thiazide such as chlorthalidone.  She has had significant lower extremity swelling with amlodipine.  Will increase lisinopril to 20 mg twice daily, will monitor closely see next.  Plan: Basic metabolic panel, lisinopril (ZESTRIL) 20         MG tablet          (N18.31) Stage 3a chronic kidney disease (H)  Comment: Mild increased creatinine to 1.3, has been slowly increasing over the last number of months.  Suspect represents hypertensive nephropathy as well as effect of increased body mass.  Favor continuing with plan to increase lisinopril given intolerance to other meds, likely effects of hypertension.  However we will monitor closely.  Plan:   - Follow-up in 1 week for BMP    (Z68.41) Body mass index (BMI) of 40.0-44.9 in adult (H)  Comment: Has had some improvement dose of 15 mg weekly.  Possibly contributing to the above with decreased fluid intake as she does note decreased hunger as well.  Recommended to make sure that this is run under her Medicaid plan at the pharmacy as should be covered.  Plan: tirzepatide-Weight Management (ZEPBOUND) 15         MG/0.5ML prefilled pen          (M1A.09X0) Idiopathic chronic gout of multiple sites without tophus  Comment: Significant proven in her pain and leukocytosis after starting colchicine and allopurinol.  Suspect represents gout.  Refilled today  Plan: allopurinol (ZYLOPRIM) 100 MG tablet          (E03.9) Acquired hypothyroidism  Comment: Last TSH reasonable 2.04, no symptoms of hyper or hypothyroidism. Refilled today  Plan: levothyroxine (SYNTHROID/LEVOTHROID) 50 MCG         tablet          Benjamin Rosenstein, MD, MA  [facility] Faculty  "    This note was completed with the assistance of dictation software. Typos and word substitution-errors are expected and unintended.      MDM: 1+ medical condition, unstable; medication mgmt      Subjective   Jamee is a 70 year old, presenting for the following health issues:  Follow Up (Blood pressure and medication follow up. ) and Refill Request (Refill levothyroxine medication)      5/13/2025     3:27 PM   Additional Questions   Roomed by Hser   Accompanied by Self         5/13/2025    Information    services provided? Yes   Language Hmong   Type of interpretation provided Face-to-face    name Steve Moody    Agency [employer]      Coming for follow-up on blood pressure and weight loss.  Says her blood pressures at home typically are much lower than here, reports this morning was 144.  She did stop taking hydrochlorothiazide due to side effects of nausea, stomach pain, reflux.  Said she tried it for 4 days and this occurred each time.  She is continue to take lisinopril daily.    She had been doing well with tirzepatide.  Has not filled recently due to insurance denial.  Appears to not cover the Medicare part D.  However she does have dual participation.     She has been trying to do more exercise outside with nicer weather.  Says she does participate in some light resistance exercises well with watching videos.        Objective    BP (!) 173/90   Pulse 70   Temp 97.7  F (36.5  C) (Oral)   Resp 18   Ht 1.485 m (4' 10.47\")   Wt 81.7 kg (180 lb 1.9 oz)   SpO2 98%   BMI 37.05 kg/m    Body mass index is 37.05 kg/m .  Physical Exam     General: Awake, seated comfortably, appears well and NAD   CV: RRR, normal S1/S2, no murmurs/rubs/gallops  Pulm: Normal WOB, lungs CTAB, no wheezes or crackles  Neuro: Alert, answering questions appropriately, normal thought processes; Normal Gait     Results for orders placed or performed in visit on 05/13/25 (from the past 24 " hours)   Basic metabolic panel   Result Value Ref Range    Sodium 141 135 - 145 mmol/L    Potassium 4.3 3.4 - 5.3 mmol/L    Chloride 116 (H) 94 - 109 mmol/L    Carbon Dioxide (CO2) 21 20 - 32 mmol/L    Anion Gap 4 3 - 14 mmol/L    Urea Nitrogen 49 (H) 7 - 30 mg/dL    Creatinine 1.30 (H) 0.52 - 1.04 mg/dL    GFR Estimate 45 (L) >60 mL/min/1.73m2    Calcium 9.8 8.5 - 10.1 mg/dL    Glucose 116 (H) 70 - 99 mg/dL           Signed Electronically by: Benjamin Rosenstein, MD

## 2025-05-14 NOTE — TELEPHONE ENCOUNTER
Called let patient know, patient requesting if there is an alternative that can be sent if insurance is not covering, medicare is primary, pharmacy cannot run under secondary Medicaid plan. Please advise further thank you.

## 2025-06-04 ENCOUNTER — APPOINTMENT (OUTPATIENT)
Dept: INTERPRETER SERVICES | Facility: CLINIC | Age: 70
End: 2025-06-04
Payer: COMMERCIAL

## 2025-06-24 ENCOUNTER — OFFICE VISIT (OUTPATIENT)
Dept: FAMILY MEDICINE | Facility: CLINIC | Age: 70
End: 2025-06-24
Payer: COMMERCIAL

## 2025-06-24 VITALS
HEIGHT: 58 IN | SYSTOLIC BLOOD PRESSURE: 131 MMHG | WEIGHT: 176.12 LBS | DIASTOLIC BLOOD PRESSURE: 81 MMHG | TEMPERATURE: 98 F | HEART RATE: 87 BPM | RESPIRATION RATE: 16 BRPM | OXYGEN SATURATION: 97 % | BODY MASS INDEX: 36.97 KG/M2

## 2025-06-24 DIAGNOSIS — R06.09 DYSPNEA ON EXERTION: ICD-10-CM

## 2025-06-24 DIAGNOSIS — I10 ESSENTIAL HYPERTENSION: ICD-10-CM

## 2025-06-24 DIAGNOSIS — M79.89 SWELLING OF BOTH LOWER EXTREMITIES: ICD-10-CM

## 2025-06-24 DIAGNOSIS — E66.1 CLASS 2 DRUG-INDUCED OBESITY WITHOUT SERIOUS COMORBIDITY WITH BODY MASS INDEX (BMI) OF 38.0 TO 38.9 IN ADULT: Primary | ICD-10-CM

## 2025-06-24 DIAGNOSIS — E66.812 CLASS 2 DRUG-INDUCED OBESITY WITHOUT SERIOUS COMORBIDITY WITH BODY MASS INDEX (BMI) OF 38.0 TO 38.9 IN ADULT: Primary | ICD-10-CM

## 2025-06-24 DIAGNOSIS — N18.31 STAGE 3A CHRONIC KIDNEY DISEASE (H): ICD-10-CM

## 2025-06-24 DIAGNOSIS — M1A.09X0 IDIOPATHIC CHRONIC GOUT OF MULTIPLE SITES WITHOUT TOPHUS: ICD-10-CM

## 2025-06-24 DIAGNOSIS — R53.81 PHYSICAL DECONDITIONING: ICD-10-CM

## 2025-06-24 DIAGNOSIS — I25.2 HISTORY OF NON-ST ELEVATION MYOCARDIAL INFARCTION (NSTEMI): ICD-10-CM

## 2025-06-24 DIAGNOSIS — E79.0 ELEVATED URIC ACID IN BLOOD: ICD-10-CM

## 2025-06-24 DIAGNOSIS — M46.1 SI JOINT ARTHRITIS: ICD-10-CM

## 2025-06-24 LAB
ANION GAP SERPL CALCULATED.3IONS-SCNC: 6 MMOL/L (ref 3–14)
BUN SERPL-MCNC: 19 MG/DL (ref 7–30)
CALCIUM SERPL-MCNC: 9.6 MG/DL (ref 8.5–10.1)
CHLORIDE BLD-SCNC: 110 MMOL/L (ref 94–109)
CO2 SERPL-SCNC: 26 MMOL/L (ref 20–32)
CREAT SERPL-MCNC: 1.1 MG/DL (ref 0.52–1.04)
EGFRCR SERPLBLD CKD-EPI 2021: 54 ML/MIN/1.73M2
GLUCOSE BLD-MCNC: 138 MG/DL (ref 70–99)
NT-PROBNP SERPL-MCNC: 260 PG/ML (ref 0–353)
POTASSIUM BLD-SCNC: 5.4 MMOL/L (ref 3.4–5.3)
SODIUM SERPL-SCNC: 142 MMOL/L (ref 135–145)
URATE SERPL-MCNC: 10.7 MG/DL (ref 2.4–5.7)

## 2025-06-24 PROCEDURE — 3075F SYST BP GE 130 - 139MM HG: CPT | Performed by: FAMILY MEDICINE

## 2025-06-24 PROCEDURE — 3078F DIAST BP <80 MM HG: CPT | Performed by: FAMILY MEDICINE

## 2025-06-24 PROCEDURE — 84550 ASSAY OF BLOOD/URIC ACID: CPT | Performed by: FAMILY MEDICINE

## 2025-06-24 PROCEDURE — 99214 OFFICE O/P EST MOD 30 MIN: CPT | Performed by: FAMILY MEDICINE

## 2025-06-24 PROCEDURE — 36415 COLL VENOUS BLD VENIPUNCTURE: CPT | Performed by: FAMILY MEDICINE

## 2025-06-24 PROCEDURE — 80048 BASIC METABOLIC PNL TOTAL CA: CPT | Performed by: FAMILY MEDICINE

## 2025-06-24 PROCEDURE — 83880 ASSAY OF NATRIURETIC PEPTIDE: CPT | Performed by: FAMILY MEDICINE

## 2025-06-24 RX ORDER — COLCHICINE 0.6 MG/1
0.6 TABLET ORAL DAILY
Qty: 30 TABLET | Refills: 1 | Status: SHIPPED | OUTPATIENT
Start: 2025-06-24

## 2025-06-24 RX ORDER — FUROSEMIDE 20 MG/1
20 TABLET ORAL DAILY
Qty: 30 TABLET | Refills: 1 | Status: SHIPPED | OUTPATIENT
Start: 2025-06-24

## 2025-06-24 NOTE — PROGRESS NOTES
[facility] [address] [place] [address]  Phone: [phone]  Fax: [phone]    Patient:  Jamee Muhammad, Date of birth 1955  Date of Visit:  06/24/2025  Referring Provider Referred Self  Reason for visit: Follow Up (Follow up blood pressure and weight. ) and Mass (Pt stated that she is having swollen on both feet and weakness and it is not getting better. 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  Continue to encourage increased physical activity as able.  Further evaluation of weakness and fatigue as below.    (R53.81) Physical deconditioning  Comment: Likely multifactorial related to her history of NSTEMI and I suspect ischemic cardiomyopathy.  She did not ever go to cardiac rehab.  Additionally with deconditioning related to obesity, depression, and arthritis.  Referral for physical therapy mobility and conditioning  Plan: Physical Therapy  Referral          (M79.89) Swelling of both lower extremities  Comment: Suspicious for HFpEF given prior echocardiogram findings.  May also represent dependent edema particularly with her weight.  Labs today show she is hyperkalemic, creatinine decreased despite increase in lisinopril, which may represent hyperfiltration and increased volume.  Start Lasix 20 mg daily, follow-up in 2 weeks  Plan: furosemide (LASIX) 20 MG tablet          (I25.2) History of non-ST elevation myocardial infarction (NSTEMI)  (R06.09) Dyspnea on exertion  Comment: As noted suspect represents HFpEF.  However given her history, potentially worsening systolic function in the past year.  Will obtain new echocardiogram, NT proBNP today.  Furosemide as  above.  Plan: Echocardiogram Complete, NT-proBNP, furosemide         (LASIX) 20 MG tablet,     (I10) Essential hypertension  Comment: Blood pressure well-controlled on current regimen.  Will continue with lisinopril twice daily for now.  Plan: Basic metabolic panel    (N18.31) Stage 3a chronic kidney disease (H)  Comment: As noted creatinine down to 1.1 today, though suspicious for hyperfiltration.  Will monitor with starting furosemide  Plan: Basic metabolic panel          (M1A.09X0) Idiopathic chronic gout of multiple sites without tophus  (E79.0) Elevated uric acid in blood  Comment: Suspect gout given history of joint arthritis and elevated uric acid which seem to respond to colchicine.  She notes worsening pain recently.  Will restart colchicine, obtain uric acid level  Plan: Uric acid, colchicine (COLCRYS) 0.6 MG tablet          (M46.1) SI joint arthritis  Comment: Associated with deconditioning, refer to physical therapy  Plan: Physical Therapy  Referral         Benjamin Rosenstein, MD, MA  [facility]     This note was completed with the assistance of dictation software. Typos and word substitution-errors are expected and unintended.      Consent was obtained from the patient to use an AI documentation tool in the creation of this note    I spent a total of 37 minutes on the day of the visit.   Time spent by me today doing chart review, history and exam, documentation and further activities per the note  ----------------------------------------------------------------------------------------------------------------------------------------  Subjective   Pertinent history obtain from: patient    History of Present Illness      Presents today for follow-up on weight loss.  We did have to change her medication to Wegovy due to insurance coverage.  She continues to use this weekly.  She has continued to have modest weight loss, down to 176 today.    Her activity has continued to be  "limited.  She notes increasing lower extremity swelling. The swelling tends to worsen during the day, especially when she is standing or sitting.  Does tend to improve when she raises her legs at night. She says that her shortness of breath with activity has been getting worse, and she feels unsteady and weak, requiring assistance from her children to move.  She has not had any chest pain or chest pressure.    Additionally, she notes worsening of her hip and back pain.  This had previously improved with colchicine.  She does continue allopurinol.  She also feels unsteady and weak with this.          Objective     Vital signs:  /81   Pulse 87   Temp 98  F (36.7  C) (Oral)   Resp 16   Ht 1.462 m (4' 9.56\")   Wt 79.9 kg (176 lb 1.9 oz)   SpO2 97%   BMI 37.38 kg/m    Blood pressure 131/81, pulse 87, temperature 98  F (36.7  C), temperature source Oral, resp. rate 16, height 1.462 m (4' 9.56\"), weight 79.9 kg (176 lb 1.9 oz), SpO2 97%.  Physical Exam    GENERAL: Appears well, NAD  CV: RRR, normal S1/S2, no murmurs/rubs/gallops, Radial and DP pulses 2/4; bilateral pretibial pitting LE edema to the knees  PULM: Normal WOB, lungs CTAB, no wheezes or crackles, good air movement  GI: Abdomen soft, not tender, not distended, BS normal and active throughout  NEURO: Alert, answering questions appropriately, CN II-XII grossly intact, normal gait         Results      Results for orders placed or performed in visit on 06/24/25   Basic metabolic panel     Status: Abnormal   Result Value Ref Range    Sodium 142 135 - 145 mmol/L    Potassium 5.4 (H) 3.4 - 5.3 mmol/L    Chloride 110 (H) 94 - 109 mmol/L    Carbon Dioxide (CO2) 26 20 - 32 mmol/L    Anion Gap 6 3 - 14 mmol/L    Urea Nitrogen 19 7 - 30 mg/dL    Creatinine 1.10 (H) 0.52 - 1.04 mg/dL    GFR Estimate 54 (L) >60 mL/min/1.73m2    Calcium 9.6 8.5 - 10.1 mg/dL    Glucose 138 (H) 70 - 99 mg/dL       Laboratory data and imaging listed below was reviewed prior to this " encounter.

## 2025-06-25 ENCOUNTER — RESULTS FOLLOW-UP (OUTPATIENT)
Dept: FAMILY MEDICINE | Facility: CLINIC | Age: 70
End: 2025-06-25

## 2025-06-26 ENCOUNTER — APPOINTMENT (OUTPATIENT)
Dept: INTERPRETER SERVICES | Facility: CLINIC | Age: 70
End: 2025-06-26
Payer: COMMERCIAL

## 2025-07-08 ENCOUNTER — HOSPITAL ENCOUNTER (INPATIENT)
Facility: HOSPITAL | Age: 70
End: 2025-07-08
Attending: STUDENT IN AN ORGANIZED HEALTH CARE EDUCATION/TRAINING PROGRAM
Payer: COMMERCIAL

## 2025-07-08 ENCOUNTER — APPOINTMENT (OUTPATIENT)
Dept: CT IMAGING | Facility: HOSPITAL | Age: 70
End: 2025-07-08
Attending: STUDENT IN AN ORGANIZED HEALTH CARE EDUCATION/TRAINING PROGRAM
Payer: COMMERCIAL

## 2025-07-08 ENCOUNTER — OFFICE VISIT (OUTPATIENT)
Dept: FAMILY MEDICINE | Facility: CLINIC | Age: 70
End: 2025-07-08
Payer: COMMERCIAL

## 2025-07-08 VITALS
TEMPERATURE: 98.7 F | BODY MASS INDEX: 37.97 KG/M2 | OXYGEN SATURATION: 98 % | HEIGHT: 57 IN | HEART RATE: 75 BPM | SYSTOLIC BLOOD PRESSURE: 127 MMHG | WEIGHT: 176 LBS | RESPIRATION RATE: 20 BRPM | DIASTOLIC BLOOD PRESSURE: 73 MMHG

## 2025-07-08 DIAGNOSIS — R07.9 CHEST PAIN, UNSPECIFIED TYPE: ICD-10-CM

## 2025-07-08 DIAGNOSIS — K25.0 ACUTE GASTRIC ULCER WITH HEMORRHAGE: ICD-10-CM

## 2025-07-08 DIAGNOSIS — D64.9 ANEMIA, UNSPECIFIED TYPE: ICD-10-CM

## 2025-07-08 DIAGNOSIS — I10 ESSENTIAL HYPERTENSION: ICD-10-CM

## 2025-07-08 DIAGNOSIS — K92.1 MELENA: Primary | ICD-10-CM

## 2025-07-08 DIAGNOSIS — R06.09 DOE (DYSPNEA ON EXERTION): Primary | ICD-10-CM

## 2025-07-08 LAB
ABO + RH BLD: NORMAL
ALBUMIN SERPL BCG-MCNC: 3.7 G/DL (ref 3.5–5.2)
ALP SERPL-CCNC: 76 U/L (ref 40–150)
ALT SERPL W P-5'-P-CCNC: 25 U/L (ref 0–50)
ANION GAP SERPL CALCULATED.3IONS-SCNC: 11 MMOL/L (ref 7–15)
APTT PPP: 25 SECONDS (ref 22–38)
AST SERPL W P-5'-P-CCNC: 17 U/L (ref 0–45)
BASOPHILS # BLD AUTO: 0 10E3/UL (ref 0–0.2)
BASOPHILS NFR BLD AUTO: 0 %
BILIRUB DIRECT SERPL-MCNC: 0.12 MG/DL (ref 0–0.3)
BILIRUB SERPL-MCNC: 0.4 MG/DL
BLD GP AB SCN SERPL QL: NEGATIVE
BLD PROD TYP BPU: NORMAL
BLOOD COMPONENT TYPE: NORMAL
BUN SERPL-MCNC: 42.6 MG/DL (ref 8–23)
CALCIUM SERPL-MCNC: 9.1 MG/DL (ref 8.8–10.4)
CHLORIDE SERPL-SCNC: 112 MMOL/L (ref 98–107)
CODING SYSTEM: NORMAL
CREAT SERPL-MCNC: 1.35 MG/DL (ref 0.51–0.95)
CROSSMATCH: NORMAL
D DIMER PPP FEU-MCNC: 3.96 UG/ML FEU (ref 0–0.5)
EGFRCR SERPLBLD CKD-EPI 2021: 43 ML/MIN/1.73M2
EOSINOPHIL # BLD AUTO: 0.1 10E3/UL (ref 0–0.7)
EOSINOPHIL NFR BLD AUTO: 2 %
ERYTHROCYTE [DISTWIDTH] IN BLOOD BY AUTOMATED COUNT: 16.2 % (ref 10–15)
GLUCOSE SERPL-MCNC: 102 MG/DL (ref 70–99)
HCO3 SERPL-SCNC: 21 MMOL/L (ref 22–29)
HCT VFR BLD AUTO: 25.5 % (ref 35–47)
HGB BLD-MCNC: 7.4 G/DL (ref 11.7–15.7)
IMM GRANULOCYTES # BLD: 0.1 10E3/UL
IMM GRANULOCYTES NFR BLD: 1 %
INR PPP: 0.95 (ref 0.85–1.15)
ISSUE DATE AND TIME: NORMAL
LIPASE SERPL-CCNC: 72 U/L (ref 13–60)
LYMPHOCYTES # BLD AUTO: 2.3 10E3/UL (ref 0.8–5.3)
LYMPHOCYTES NFR BLD AUTO: 25 %
MCH RBC QN AUTO: 28.4 PG (ref 26.5–33)
MCHC RBC AUTO-ENTMCNC: 29 G/DL (ref 31.5–36.5)
MCV RBC AUTO: 98 FL (ref 78–100)
MONOCYTES # BLD AUTO: 0.9 10E3/UL (ref 0–1.3)
MONOCYTES NFR BLD AUTO: 10 %
NEUTROPHILS # BLD AUTO: 5.5 10E3/UL (ref 1.6–8.3)
NEUTROPHILS NFR BLD AUTO: 62 %
NRBC # BLD AUTO: 0 10E3/UL
NRBC BLD AUTO-RTO: 0 /100
NT-PROBNP SERPL-MCNC: 277 PG/ML (ref 0–353)
PLATELET # BLD AUTO: 341 10E3/UL (ref 150–450)
POTASSIUM SERPL-SCNC: 4.1 MMOL/L (ref 3.4–5.3)
PROT SERPL-MCNC: 6.3 G/DL (ref 6.4–8.3)
PROTHROMBIN TIME: 13 SECONDS (ref 11.8–14.8)
RBC # BLD AUTO: 2.61 10E6/UL (ref 3.8–5.2)
SODIUM SERPL-SCNC: 144 MMOL/L (ref 135–145)
SPECIMEN EXP DATE BLD: NORMAL
TROPONIN T SERPL HS-MCNC: 41 NG/L
TROPONIN T SERPL HS-MCNC: 48 NG/L
UNIT ABO/RH: NORMAL
UNIT NUMBER: NORMAL
UNIT STATUS: NORMAL
UNIT TYPE ISBT: 7300
WBC # BLD AUTO: 8.9 10E3/UL (ref 4–11)

## 2025-07-08 PROCEDURE — 85379 FIBRIN DEGRADATION QUANT: CPT | Performed by: STUDENT IN AN ORGANIZED HEALTH CARE EDUCATION/TRAINING PROGRAM

## 2025-07-08 PROCEDURE — 83690 ASSAY OF LIPASE: CPT | Performed by: STUDENT IN AN ORGANIZED HEALTH CARE EDUCATION/TRAINING PROGRAM

## 2025-07-08 PROCEDURE — 250N000013 HC RX MED GY IP 250 OP 250 PS 637

## 2025-07-08 PROCEDURE — 74177 CT ABD & PELVIS W/CONTRAST: CPT

## 2025-07-08 PROCEDURE — 93005 ELECTROCARDIOGRAM TRACING: CPT | Performed by: STUDENT IN AN ORGANIZED HEALTH CARE EDUCATION/TRAINING PROGRAM

## 2025-07-08 PROCEDURE — 85610 PROTHROMBIN TIME: CPT | Performed by: STUDENT IN AN ORGANIZED HEALTH CARE EDUCATION/TRAINING PROGRAM

## 2025-07-08 PROCEDURE — 96374 THER/PROPH/DIAG INJ IV PUSH: CPT

## 2025-07-08 PROCEDURE — 250N000011 HC RX IP 250 OP 636: Performed by: STUDENT IN AN ORGANIZED HEALTH CARE EDUCATION/TRAINING PROGRAM

## 2025-07-08 PROCEDURE — 84484 ASSAY OF TROPONIN QUANT: CPT | Performed by: STUDENT IN AN ORGANIZED HEALTH CARE EDUCATION/TRAINING PROGRAM

## 2025-07-08 PROCEDURE — 120N000001 HC R&B MED SURG/OB

## 2025-07-08 PROCEDURE — 85520 HEPARIN ASSAY: CPT

## 2025-07-08 PROCEDURE — 250N000013 HC RX MED GY IP 250 OP 250 PS 637: Performed by: FAMILY MEDICINE

## 2025-07-08 PROCEDURE — 250N000011 HC RX IP 250 OP 636

## 2025-07-08 PROCEDURE — 86923 COMPATIBILITY TEST ELECTRIC: CPT

## 2025-07-08 PROCEDURE — 85004 AUTOMATED DIFF WBC COUNT: CPT | Performed by: STUDENT IN AN ORGANIZED HEALTH CARE EDUCATION/TRAINING PROGRAM

## 2025-07-08 PROCEDURE — 36415 COLL VENOUS BLD VENIPUNCTURE: CPT

## 2025-07-08 PROCEDURE — 83880 ASSAY OF NATRIURETIC PEPTIDE: CPT | Performed by: STUDENT IN AN ORGANIZED HEALTH CARE EDUCATION/TRAINING PROGRAM

## 2025-07-08 PROCEDURE — 36415 COLL VENOUS BLD VENIPUNCTURE: CPT | Performed by: STUDENT IN AN ORGANIZED HEALTH CARE EDUCATION/TRAINING PROGRAM

## 2025-07-08 PROCEDURE — 71275 CT ANGIOGRAPHY CHEST: CPT

## 2025-07-08 PROCEDURE — 84155 ASSAY OF PROTEIN SERUM: CPT | Performed by: STUDENT IN AN ORGANIZED HEALTH CARE EDUCATION/TRAINING PROGRAM

## 2025-07-08 PROCEDURE — 86900 BLOOD TYPING SEROLOGIC ABO: CPT | Performed by: STUDENT IN AN ORGANIZED HEALTH CARE EDUCATION/TRAINING PROGRAM

## 2025-07-08 PROCEDURE — 80048 BASIC METABOLIC PNL TOTAL CA: CPT | Performed by: STUDENT IN AN ORGANIZED HEALTH CARE EDUCATION/TRAINING PROGRAM

## 2025-07-08 PROCEDURE — 250N000011 HC RX IP 250 OP 636: Performed by: FAMILY MEDICINE

## 2025-07-08 PROCEDURE — 85730 THROMBOPLASTIN TIME PARTIAL: CPT | Performed by: STUDENT IN AN ORGANIZED HEALTH CARE EDUCATION/TRAINING PROGRAM

## 2025-07-08 PROCEDURE — 99285 EMERGENCY DEPT VISIT HI MDM: CPT | Mod: 25

## 2025-07-08 RX ORDER — HEPARIN SODIUM 10000 [USP'U]/100ML
0-5000 INJECTION, SOLUTION INTRAVENOUS CONTINUOUS
Status: DISCONTINUED | OUTPATIENT
Start: 2025-07-08 | End: 2025-07-12 | Stop reason: HOSPADM

## 2025-07-08 RX ORDER — ASPIRIN 81 MG/1
81 TABLET ORAL DAILY
Status: DISCONTINUED | OUTPATIENT
Start: 2025-07-09 | End: 2025-07-12 | Stop reason: HOSPADM

## 2025-07-08 RX ORDER — FUROSEMIDE 20 MG/1
20 TABLET ORAL DAILY
Status: DISCONTINUED | OUTPATIENT
Start: 2025-07-09 | End: 2025-07-12 | Stop reason: HOSPADM

## 2025-07-08 RX ORDER — BUPROPION HYDROCHLORIDE 150 MG/1
150 TABLET ORAL DAILY PRN
COMMUNITY

## 2025-07-08 RX ORDER — ATORVASTATIN CALCIUM 40 MG/1
40 TABLET, FILM COATED ORAL EVERY EVENING
Status: DISCONTINUED | OUTPATIENT
Start: 2025-07-08 | End: 2025-07-12 | Stop reason: HOSPADM

## 2025-07-08 RX ORDER — LISINOPRIL 20 MG/1
20 TABLET ORAL 2 TIMES DAILY
Status: DISCONTINUED | OUTPATIENT
Start: 2025-07-08 | End: 2025-07-12 | Stop reason: HOSPADM

## 2025-07-08 RX ORDER — ACETAMINOPHEN 650 MG/1
650 SUPPOSITORY RECTAL EVERY 4 HOURS PRN
Status: DISCONTINUED | OUTPATIENT
Start: 2025-07-08 | End: 2025-07-12 | Stop reason: HOSPADM

## 2025-07-08 RX ORDER — CALCIUM CARBONATE 500 MG/1
1000 TABLET, CHEWABLE ORAL 4 TIMES DAILY PRN
Status: DISCONTINUED | OUTPATIENT
Start: 2025-07-08 | End: 2025-07-12 | Stop reason: HOSPADM

## 2025-07-08 RX ORDER — AMOXICILLIN 250 MG
2 CAPSULE ORAL 2 TIMES DAILY PRN
Status: DISCONTINUED | OUTPATIENT
Start: 2025-07-08 | End: 2025-07-12 | Stop reason: HOSPADM

## 2025-07-08 RX ORDER — ONDANSETRON 2 MG/ML
4 INJECTION INTRAMUSCULAR; INTRAVENOUS EVERY 6 HOURS PRN
Status: DISCONTINUED | OUTPATIENT
Start: 2025-07-08 | End: 2025-07-12 | Stop reason: HOSPADM

## 2025-07-08 RX ORDER — LIDOCAINE 40 MG/G
CREAM TOPICAL
Status: DISCONTINUED | OUTPATIENT
Start: 2025-07-08 | End: 2025-07-12 | Stop reason: HOSPADM

## 2025-07-08 RX ORDER — VITAMIN B COMPLEX
50 TABLET ORAL EVERY EVENING
Status: DISCONTINUED | OUTPATIENT
Start: 2025-07-08 | End: 2025-07-12 | Stop reason: HOSPADM

## 2025-07-08 RX ORDER — PROCHLORPERAZINE MALEATE 5 MG/1
5 TABLET ORAL EVERY 6 HOURS PRN
Status: DISCONTINUED | OUTPATIENT
Start: 2025-07-08 | End: 2025-07-12 | Stop reason: HOSPADM

## 2025-07-08 RX ORDER — BUPROPION HYDROCHLORIDE 150 MG/1
150 TABLET ORAL DAILY
Status: DISCONTINUED | OUTPATIENT
Start: 2025-07-09 | End: 2025-07-11

## 2025-07-08 RX ORDER — IOPAMIDOL 755 MG/ML
80 INJECTION, SOLUTION INTRAVASCULAR ONCE
Status: COMPLETED | OUTPATIENT
Start: 2025-07-08 | End: 2025-07-08

## 2025-07-08 RX ORDER — ALLOPURINOL 100 MG/1
100 TABLET ORAL DAILY
Status: DISCONTINUED | OUTPATIENT
Start: 2025-07-09 | End: 2025-07-12 | Stop reason: HOSPADM

## 2025-07-08 RX ORDER — LEVOTHYROXINE SODIUM 25 UG/1
50 TABLET ORAL DAILY
Status: DISCONTINUED | OUTPATIENT
Start: 2025-07-09 | End: 2025-07-12 | Stop reason: HOSPADM

## 2025-07-08 RX ORDER — ACETAMINOPHEN 325 MG/1
650 TABLET ORAL EVERY 4 HOURS PRN
Status: DISCONTINUED | OUTPATIENT
Start: 2025-07-08 | End: 2025-07-12 | Stop reason: HOSPADM

## 2025-07-08 RX ORDER — AMOXICILLIN 250 MG
1 CAPSULE ORAL 2 TIMES DAILY PRN
Status: DISCONTINUED | OUTPATIENT
Start: 2025-07-08 | End: 2025-07-12 | Stop reason: HOSPADM

## 2025-07-08 RX ORDER — ONDANSETRON 4 MG/1
4 TABLET, ORALLY DISINTEGRATING ORAL EVERY 6 HOURS PRN
Status: DISCONTINUED | OUTPATIENT
Start: 2025-07-08 | End: 2025-07-12 | Stop reason: HOSPADM

## 2025-07-08 RX ORDER — CHOLECALCIFEROL (VITAMIN D3) 50 MCG
1 TABLET ORAL EVERY EVENING
COMMUNITY

## 2025-07-08 RX ADMIN — Medication 50 MCG: at 21:14

## 2025-07-08 RX ADMIN — HEPARIN SODIUM 1450 UNITS/HR: 10000 INJECTION, SOLUTION INTRAVENOUS at 18:18

## 2025-07-08 RX ADMIN — ACETAMINOPHEN 650 MG: 325 TABLET ORAL at 23:08

## 2025-07-08 RX ADMIN — ATORVASTATIN CALCIUM 40 MG: 40 TABLET, FILM COATED ORAL at 21:15

## 2025-07-08 RX ADMIN — PANTOPRAZOLE SODIUM 40 MG: 40 INJECTION, POWDER, FOR SOLUTION INTRAVENOUS at 21:15

## 2025-07-08 RX ADMIN — IOPAMIDOL 75 ML: 755 INJECTION, SOLUTION INTRAVENOUS at 16:47

## 2025-07-08 RX ADMIN — PANTOPRAZOLE SODIUM 40 MG: 40 INJECTION, POWDER, FOR SOLUTION INTRAVENOUS at 14:41

## 2025-07-08 ASSESSMENT — ACTIVITIES OF DAILY LIVING (ADL)
ADLS_ACUITY_SCORE: 50
ADLS_ACUITY_SCORE: 52
ADLS_ACUITY_SCORE: 50
ADLS_ACUITY_SCORE: 52
ADLS_ACUITY_SCORE: 50

## 2025-07-08 ASSESSMENT — COLUMBIA-SUICIDE SEVERITY RATING SCALE - C-SSRS
2. HAVE YOU ACTUALLY HAD ANY THOUGHTS OF KILLING YOURSELF IN THE PAST MONTH?: NO
6. HAVE YOU EVER DONE ANYTHING, STARTED TO DO ANYTHING, OR PREPARED TO DO ANYTHING TO END YOUR LIFE?: NO
1. IN THE PAST MONTH, HAVE YOU WISHED YOU WERE DEAD OR WISHED YOU COULD GO TO SLEEP AND NOT WAKE UP?: NO

## 2025-07-08 NOTE — H&P (VIEW-ONLY)
[facility] Dig[facility] consult         Name: Jamee Muhammad    Medical Record #: [id number]    YOB: 1955    Date/Time: 7/8/2025/3:23 PM    Reason for Consultation: Mata Mares MD has asked me to evaluate Jamee Muhammad regarding anemia, melena.    HPI: 70-year-old female with history of CAD, CKD, hypertension who initially presented to the ED with chest pain, shortness of breath.  EKG was unremarkable.  Labs revealed hemoglobin of 7.4, which is lower than her baseline hemoglobin of 11.8 from March 2025.  Later, patient endorsed 1 week history of dark tarry stools about 3 to 4 weeks ago which resolved on its own and now notes regular brown bowel movements.    Does note multiple family members with H. pylori, including her  who had H. pylori related peptic ulcer leading to melena.  Denies NSAID use.    Has never had EGD or colonoscopy.    Review of Systems (ROS): Complete ROS otherwise negative except for as above.    Past Medical History:  Past Medical History:   Diagnosis Date    STEMI (ST elevation myocardial infarction) (H)        Medications:   (Not in a hospital admission)      Current Facility-Administered Medications:     pantoprazole (PROTONIX) injection 40 mg, 40 mg, Intravenous, BID, Mata Mares MD    Current Outpatient Medications:     acetaminophen (TYLENOL) 500 MG tablet, Take 1-2 tablets (500-1,000 mg) by mouth every 6 hours as needed for mild pain, Disp: 200 tablet, Rfl: 3    allopurinol (ZYLOPRIM) 100 MG tablet, Take 1 tablet (100 mg) by mouth daily., Disp: 90 tablet, Rfl: 1    aspirin 81 MG EC tablet, Take 1 tablet (81 mg) by mouth daily., Disp: 90 tablet, Rfl: 4    atorvastatin (LIPITOR) 40 MG tablet, Take 1 tablet (40 mg) by mouth every evening., Disp: 90 tablet, Rfl: 3    buPROPion (WELLBUTRIN XL) 150 MG 24 hr tablet, Take 1 tablet (150 mg) by mouth every morning., Disp: 30 tablet, Rfl: 1    cholecalciferol, vitamin D3, 2,000 unit cap, [CHOLECALCIFEROL, VITAMIN D3, 2,000 UNIT CAP] Take 2  capsules by mouth daily., Disp: 100 each, Rfl: 3    colchicine (COLCRYS) 0.6 MG tablet, Take 1 tablet (0.6 mg) by mouth daily., Disp: 30 tablet, Rfl: 1    furosemide (LASIX) 20 MG tablet, Take 1 tablet (20 mg) by mouth daily., Disp: 30 tablet, Rfl: 1    levothyroxine (SYNTHROID/LEVOTHROID) 50 MCG tablet, Take 1 tablet (50 mcg) by mouth daily., Disp: 90 tablet, Rfl: 1    lisinopril (ZESTRIL) 20 MG tablet, Take 1 tablet (20 mg) by mouth 2 times daily., Disp: 90 tablet, Rfl: 1    Semaglutide-Weight Management (WEGOVY) 2.4 MG/0.75ML pen, Inject 2.4 mg subcutaneously once a week., Disp: 3 mL, Rfl: 3       Allergies: Celery oil, Doxycycline, Gadavist [gadobutrol], Other allergy (see comments) [external allergen needs reconciliation - see comment], Avocado extract allergy skin test, and Cashew nut oil    Family History:  No family history on file.    Social History:  Social History     Tobacco Use    Smoking status: Never     Passive exposure: Never    Smokeless tobacco: Never   Substance Use Topics    Alcohol use: No    Drug use: No           Physical Exam: /59   Pulse 68   Temp 97.7  F (36.5  C) (Oral)   Resp 17   LMP  (LMP Unknown)   SpO2 98%     General: NAD  Eyes: Anicteric  Gastrointestinal: Soft, NT, ND, no rebound or guarding  Skin: No jaundice  Psych: Calm, cooperative    Labs:    CBC RESULTS:   Recent Labs   Lab Test 07/08/25  1226   WBC 8.9   RBC 2.61*   HGB 7.4*   HCT 25.5*   MCV 98   MCH 28.4   MCHC 29.0*   RDW 16.2*           CMP Results:   Recent Labs   Lab Test 07/08/25  1226      POTASSIUM 4.1   CHLORIDE 112*   CO2 21*   ANIONGAP 11   *   BUN 42.6*   CR 1.35*   BILITOTAL 0.4   ALKPHOS 76   ALT 25   AST 17        INR Results:   Recent Labs   Lab Test 07/08/25  1255   INR 0.95          Radiology: No results found.     Impression:   Anemia with reported history of melena around 3 to 4 weeks ago.  Suspicious for upper GI blood loss.  Given multiple family members with H. pylori,  H. pylori related peptic ulcer would be high in the differential.  Chest pain, shortness of breath-could be partly related to symptomatic anemia.  Other workup for cardiopulmonary causes in process per ED.    Recommendation:   -IV Protonix 40 mg twice daily  - Okay for clear liquid diet today  - Monitor hemoglobin and transfuse as necessary  -Will plan for EGD tomorrow pending completed evaluation of chest pain  - Keep n.p.o. after midnight      Total visit time = 37 minutes                                                  IDALMIS Mcnulty  Thank you for the opportunity to participate in the care of this patient.   Please feel free to call me with any questions or concerns.  Phone number [phone].

## 2025-07-08 NOTE — PROGRESS NOTES
Brief progress note  Discussed results of CT with patient and family showing right-sided PE with mild clot burden and no evidence of right heart strain. Heparin started by Dr. Ellison as patient has been vitally and clinically stable despite possible GI bleed. No recent black or bloody stools. Will continue monitor for any changes and watch hemoglobin while treating clot. Patient also discussed code status with family more and would like to change to full code.     Kenji Esparza MD

## 2025-07-08 NOTE — PROVIDER NOTIFICATION
Patient name Jb Mancuso. Family wants to know results of CT. And will like to speak with MD. Please advice.

## 2025-07-08 NOTE — PROGRESS NOTES
"  Assessment & Plan     KING (dyspnea on exertion)  Initially planned to do EKG, but on speaking with patient I am concerned that ACS should be ruled out at this point with worsening exertional symptoms in the setting of improved swelling on lasix, heart failure exacerbation is less likely. Only somewhat volume overloaded on exam today. Patient did not feel safe to drive again though she did drive here, so EMS was called.  - EKG 12-lead complete w/read - Clinics    Hypertension  - Basic metabolic panel; Future  - Basic metabolic panel        Destiny Mancuso is a 70 year old, presenting for the following health issues:  RECHECK (Heart, Hx of heart attack, been going on for 2-3 weeks, there was some pain but not today, pt unsure if new dose of med cause it too. )      7/8/2025    10:30 AM   Additional Questions   Roomed by Jm   Accompanied by Spouse         7/8/2025    Information    services provided? Yes   Language Hmong   Type of interpretation provided Video    name Unknown    ID NA    [facility]  Services     HPI      Here to follow up on cardiac concerns. Seen 2 weeks ago with suspected HfPEF exacerbation, started lasix. Swelling improved but episodes of exertional symptoms are worsening.       Heart concerns - resting is okay, with movement has feelings like \"heart is going to stop,\" new feelings for her, not the same as heart attack previously. Feels like entire body stops. No pain, heart beats really fast, feeling lethargic and fatigued. Improves with rest. 7/18 appt to get Echo.     Has not scheduled physical therapy yet    Swelling is better since starting lasix. Bilateral edema today.     No side effects from other medication, but did stop wegovy as she was concerned this was causing her symptoms                    Objective    /73   Pulse 75   Temp 98.7  F (37.1  C)   Resp 20   Ht 1.448 m (4' 9\")   Wt 79.8 kg (176 " lb)   LMP  (LMP Unknown)   SpO2 98%   BMI 38.09 kg/m    Body mass index is 38.09 kg/m .  Physical Exam     CV: Pedal edema. Normal DP pulses, normal radial pulses. RRR, no murmur. No crackles at the bases. No elevated JVP.            Signed Electronically by: Myra Flores MD

## 2025-07-08 NOTE — ED NOTES
Bed: [facility]  Expected date: 7/8/25  Expected time:   Means of arrival:   Comments:  SP   70 FEMALE  CHEST PAIN ASA GIVEN, EKG NSR

## 2025-07-08 NOTE — CONSULTS
[facility] Dig[facility]ive [facility] consult         Name: Jamee Muhammad    Medical Record #: [id number]    YOB: 1955    Date/Time: 7/8/2025/3:23 PM    Reason for Consultation: Mata Mares MD has asked me to evaluate Jamee Muhammad regarding anemia, melena.    HPI: 70-year-old female with history of CAD, CKD, hypertension who initially presented to the ED with chest pain, shortness of breath.  EKG was unremarkable.  Labs revealed hemoglobin of 7.4, which is lower than her baseline hemoglobin of 11.8 from March 2025.  Later, patient endorsed 1 week history of dark tarry stools about 3 to 4 weeks ago which resolved on its own and now notes regular brown bowel movements.    Does note multiple family members with H. pylori, including her  who had H. pylori related peptic ulcer leading to melena.  Denies NSAID use.    Has never had EGD or colonoscopy.    Review of Systems (ROS): Complete ROS otherwise negative except for as above.    Past Medical History:  Past Medical History:   Diagnosis Date    STEMI (ST elevation myocardial infarction) (H)        Medications:   (Not in a hospital admission)      Current Facility-Administered Medications:     pantoprazole (PROTONIX) injection 40 mg, 40 mg, Intravenous, BID, Mata Mares MD    Current Outpatient Medications:     acetaminophen (TYLENOL) 500 MG tablet, Take 1-2 tablets (500-1,000 mg) by mouth every 6 hours as needed for mild pain, Disp: 200 tablet, Rfl: 3    allopurinol (ZYLOPRIM) 100 MG tablet, Take 1 tablet (100 mg) by mouth daily., Disp: 90 tablet, Rfl: 1    aspirin 81 MG EC tablet, Take 1 tablet (81 mg) by mouth daily., Disp: 90 tablet, Rfl: 4    atorvastatin (LIPITOR) 40 MG tablet, Take 1 tablet (40 mg) by mouth every evening., Disp: 90 tablet, Rfl: 3    buPROPion (WELLBUTRIN XL) 150 MG 24 hr tablet, Take 1 tablet (150 mg) by mouth every morning., Disp: 30 tablet, Rfl: 1    cholecalciferol, vitamin D3, 2,000 unit cap, [CHOLECALCIFEROL, VITAMIN D3, 2,000 UNIT CAP] Take 2  capsules by mouth daily., Disp: 100 each, Rfl: 3    colchicine (COLCRYS) 0.6 MG tablet, Take 1 tablet (0.6 mg) by mouth daily., Disp: 30 tablet, Rfl: 1    furosemide (LASIX) 20 MG tablet, Take 1 tablet (20 mg) by mouth daily., Disp: 30 tablet, Rfl: 1    levothyroxine (SYNTHROID/LEVOTHROID) 50 MCG tablet, Take 1 tablet (50 mcg) by mouth daily., Disp: 90 tablet, Rfl: 1    lisinopril (ZESTRIL) 20 MG tablet, Take 1 tablet (20 mg) by mouth 2 times daily., Disp: 90 tablet, Rfl: 1    Semaglutide-Weight Management (WEGOVY) 2.4 MG/0.75ML pen, Inject 2.4 mg subcutaneously once a week., Disp: 3 mL, Rfl: 3       Allergies: Celery oil, Doxycycline, Gadavist [gadobutrol], Other allergy (see comments) [external allergen needs reconciliation - see comment], Avocado extract allergy skin test, and Cashew nut oil    Family History:  No family history on file.    Social History:  Social History     Tobacco Use    Smoking status: Never     Passive exposure: Never    Smokeless tobacco: Never   Substance Use Topics    Alcohol use: No    Drug use: No           Physical Exam: /59   Pulse 68   Temp 97.7  F (36.5  C) (Oral)   Resp 17   LMP  (LMP Unknown)   SpO2 98%     General: NAD  Eyes: Anicteric  Gastrointestinal: Soft, NT, ND, no rebound or guarding  Skin: No jaundice  Psych: Calm, cooperative    Labs:    CBC RESULTS:   Recent Labs   Lab Test 07/08/25  1226   WBC 8.9   RBC 2.61*   HGB 7.4*   HCT 25.5*   MCV 98   MCH 28.4   MCHC 29.0*   RDW 16.2*           CMP Results:   Recent Labs   Lab Test 07/08/25  1226      POTASSIUM 4.1   CHLORIDE 112*   CO2 21*   ANIONGAP 11   *   BUN 42.6*   CR 1.35*   BILITOTAL 0.4   ALKPHOS 76   ALT 25   AST 17        INR Results:   Recent Labs   Lab Test 07/08/25  1255   INR 0.95          Radiology: No results found.     Impression:   Anemia with reported history of melena around 3 to 4 weeks ago.  Suspicious for upper GI blood loss.  Given multiple family members with H. pylori,  H. pylori related peptic ulcer would be high in the differential.  Chest pain, shortness of breath-could be partly related to symptomatic anemia.  Other workup for cardiopulmonary causes in process per ED.    Recommendation:   -IV Protonix 40 mg twice daily  - Okay for clear liquid diet today  - Monitor hemoglobin and transfuse as necessary  -Will plan for EGD tomorrow pending completed evaluation of chest pain  - Keep n.p.o. after midnight      Total visit time = 37 minutes                                                  IDALMIS Mcnulty  Thank you for the opportunity to participate in the care of this patient.   Please feel free to call me with any questions or concerns.  Phone number [phone].

## 2025-07-08 NOTE — ED TRIAGE NOTES
"Patient brought in via EMS from [facility] reporting chest pain. EMS gave 324 ASA and patient noting pain decreased from 6/10 to 4/10. EKG SR en route. Hypertensive. Currently R sided, stating KING worse over last 3 weeks. Hx: heart attack. States this feels \"different\" than it did then. Hx: CHF, on diuretics and BP meds.     Triage Assessment (Adult)       Row Name 07/08/25 1158          Triage Assessment    Airway WDL WDL        Respiratory WDL    Respiratory WDL X;rhythm/pattern     Rhythm/Pattern, Respiratory dyspnea upon exertion        Skin Circulation/Temperature WDL    Skin Circulation/Temperature WDL WDL        Cardiac WDL    Cardiac WDL X;chest pain        Chest Pain Assessment    Chest Pain Location anterior chest, right     Character --  \"pinching\"     Precipitating Factors nothing     Chest Pain Intervention cardiac monitor placed;12-lead ECG obtained;aspirin given        Peripheral/Neurovascular WDL    Peripheral Neurovascular WDL WDL        Cognitive/Neuro/Behavioral WDL    Cognitive/Neuro/Behavioral WDL WDL                     "

## 2025-07-08 NOTE — ED NOTES
"Expected Patient Referral to ED  11:15 AM    Referring Clinic/Provider:  [facility]    Reason for referral/Clinical facts:  History of CAD, NSTEMI a few years ago, exertional symptoms/dyspnea, feeling fast heart rate and like \"her heart is going to stop\"    Recommendations provided:  Send to ED for further evaluation    Caller was informed that this institution does possess the capabilities and/or resources to provide for patient and should be transferred to our facility.    Discussed that if direct admit is sought and any hurdles are encountered, this ED would be happy to see the patient and evaluate.    Informed caller that recommendations provided are recommendations based only on the facts provided and that they responsible to accept or reject the advice, or to seek a formal in person consultation as needed and that this ED will see/treat patient should they arrive.      Mata Mares MD  [facility] EMERGENCY DEPARTMENT  [address] [place] [id number]  [phone]       Mata Mares MD  07/08/25 1117    "

## 2025-07-08 NOTE — H&P
[facility]    History and Physical - Hospitalist Service       Date of Admission:  7/8/2025    Assessment & Plan      Jamee Muhammad is a 70 year old female w/PMHx significant for CAD, CKD3a, HTN, and hypothyroidism admitted on 7/8/2025 for chest pain, fth R-sided PE.     Chest pain, likely 2/2 R-sided PE  Presents w/3 weeks of intermittent episodes of intense fatigue and sharp chest pain that significantly worsened overnight. D-dimer significantly elevated, so CT PE was pursued showing R-sided PE w/o evidence of RH strain. ACS workup negative - EKG NSR, trops downtrending, BNP wnl. Lipase wnl.   -Heparin load + gtt    Acute blood loss anemia, concerning for UGIB  Hgb 7.4 on admission, down from baseline ~11. Denies bloody emesis or black/red stools. She does report a sharp pain in her epigastric region that shoots to her back. Lipase wnl. CT CAP - no notes of pancreatitis. S/p cholecystectomy (2014). [facility] consulted in ED with plans of EGD in near future.   -CBC daily, transfuse for hgb < 7  -GI consult, recs appreciated   -Protonix 40mg IV BID   -Clear liquid diet, NPO at midnight in case of EGD tomorrow   -Okay to start anticoagulation for PE    CKD3a  HTN  Cr mildly elevated from baseline ~1.2, but not technically meeting criteria for MESFIN.   -Hold PTA lasix and lisinopril  -Trend BMP    Chronic conditions:  CAD -  hold PTA ASA, continue atorvastatin   Hypothyroidism - continue PTA levothryoxine 50mcg d   MDD - continue Wellbutrin            Diet: Clear Liquid Diet    DVT Prophylaxis: Heparin gtt   Crowder Catheter: Not present  Lines: None     Cardiac Monitoring: None  Code Status:      Clinically Significant Risk Factors Present on Admission          # Hyperchloremia: Highest Cl = 112 mmol/L in last 2 days, will monitor as appropriate            # Drug Induced Platelet Defect: home medication list includes an antiplatelet medication   # Hypertension: Noted on problem list      # Anemia: based on  "hgb <11  # Anemia: based on hgb <11       # Obesity: Estimated body mass index is 38.09 kg/m  as calculated from the following:    Height as of an earlier encounter on 7/8/25: 1.448 m (4' 9\").    Weight as of an earlier encounter on 7/8/25: 79.8 kg (176 lb).       # Asthma: noted on problem list        Disposition Plan     Medically Ready for Discharge: Anticipated in 2-4 Days         The patient's care to be discussed w/primary medicine team in the morning.     Park Ellison MD  Hospitalist Service  [facility]  Securely message with [employer] (more info)  Text page via [facility] Paging/Directory     ______________________________________________________________________    Chief Complaint   Chest pain    History is obtained from the patient    History of Present Illness   Jamee Muhammad is a 70 year old female who presents w/3wks of worsening, sharp, chest pain.      -Having intermittent episodes of intense fatigue and sensation of \"my heart is going to stop.\" Usually resolves w/rest. Has some lightheadedness w/this.   -Yesterday evening, pain got so intense, she was unable to sleep and went to be evaluated in clinic today.   -Reports some KING, none at rest. She had an NSTEMI previously - chest pain does not feel similar. She explains this one feels like \"knives\" and someone is \"pinching\" her. It goes all the way through to her back.   -Having some abdominal pain and heartburn sensation as well that she thinks is different.   -Exposure to H Pylori - her in-laws had this a few months ago.   -No N/V. No bloody or coffee-ground emesis. Denies constipation, diarrhea. No bloody/black stools.   -No changes in appetite. Has been trying to eat healthier by eating more leafy greens.   -Recently started on Weygovy for weight loss.       Past Medical History    Past Medical History:   Diagnosis Date    STEMI (ST elevation myocardial infarction) (H)        Past Surgical History   Past Surgical History:   Procedure " Laterality Date     SECTION  32yrs ago    CV CORONARY ANGIOGRAM N/A 9/15/2023    Procedure: Coronary Angiogram;  Surgeon: Augie Farnsworth MD;  Location: [facility] LAB CV    CV LEFT HEART CATH N/A 9/15/2023    Procedure: Left Heart Catheterization;  Surgeon: Augie Farnsworth MD;  Location: [facility] LAB CV    EYELID LACERATION REPAIR      LAPAROSCOPIC APPENDECTOMY      LAPAROSCOPIC CHOLECYSTECTOMY  2014       Prior to Admission Medications   Prior to Admission Medications   Prescriptions Last Dose Informant Patient Reported? Taking?   Semaglutide-Weight Management (WEGOVY) 2.4 MG/0.75ML pen   No No   Sig: Inject 2.4 mg subcutaneously once a week.   acetaminophen (TYLENOL) 500 MG tablet   No No   Sig: Take 1-2 tablets (500-1,000 mg) by mouth every 6 hours as needed for mild pain   allopurinol (ZYLOPRIM) 100 MG tablet   No No   Sig: Take 1 tablet (100 mg) by mouth daily.   aspirin 81 MG EC tablet   No No   Sig: Take 1 tablet (81 mg) by mouth daily.   atorvastatin (LIPITOR) 40 MG tablet   No No   Sig: Take 1 tablet (40 mg) by mouth every evening.   buPROPion (WELLBUTRIN XL) 150 MG 24 hr tablet   No No   Sig: Take 1 tablet (150 mg) by mouth every morning.   cholecalciferol, vitamin D3, 2,000 unit cap   No No   Sig: [CHOLECALCIFEROL, VITAMIN D3, 2,000 UNIT CAP] Take 2 capsules by mouth daily.   colchicine (COLCRYS) 0.6 MG tablet   No No   Sig: Take 1 tablet (0.6 mg) by mouth daily.   furosemide (LASIX) 20 MG tablet   No No   Sig: Take 1 tablet (20 mg) by mouth daily.   levothyroxine (SYNTHROID/LEVOTHROID) 50 MCG tablet   No No   Sig: Take 1 tablet (50 mcg) by mouth daily.   lisinopril (ZESTRIL) 20 MG tablet   No No   Sig: Take 1 tablet (20 mg) by mouth 2 times daily.      Facility-Administered Medications: None        Review of Systems    The 10 point Review of Systems is negative other than noted in the HPI or here.      Physical Exam   Vital Signs: Temp: 97.7  F (36.5  C) Temp src: Oral BP: 118/59  Pulse: 68   Resp: 17 SpO2: 98 % O2 Device: None (Room air)    Weight: 0 lbs 0 oz    Physical Exam  Constitutional:       General: She is not in acute distress.     Appearance: She is not ill-appearing or toxic-appearing.   Cardiovascular:      Rate and Rhythm: Regular rhythm. Tachycardia present.      Heart sounds: No murmur heard.     No friction rub. No gallop.   Pulmonary:      Effort: Pulmonary effort is normal. No respiratory distress.      Breath sounds: No wheezing, rhonchi or rales.   Abdominal:      General: Abdomen is flat. There is no distension.      Palpations: Abdomen is soft.      Tenderness: There is no abdominal tenderness.   Musculoskeletal:      Comments: Trace BLE edema.    Neurological:      General: No focal deficit present.      Mental Status: She is alert and oriented to person, place, and time.   Psychiatric:      Comments: Tearful. Sad affect. Insight fair. Linear and logical thought process.            Medical Decision Making       ------------------ MEDICAL DECISION MAKING ------------------------------------------------------------------------------------------------------      Data   ------------------------- PAST 24 HR DATA REVIEWED -----------------------------------------------

## 2025-07-08 NOTE — ED PROVIDER NOTES
"EMERGENCY DEPARTMENT ENCOUNTER      NAME: Jamee Muhammad  AGE: 70 year old female  YOB: 1955  MRN: [id number]  EVALUATION DATE & TIME: 7/8/2025 11:56 AM    PCP: Rosenstein, Benjamin    ED PROVIDER: Mata Mares MD      Chief Complaint   Patient presents with    Chest Pain         FINAL IMPRESSION:  1. Melena    2. Anemia, unspecified type    3. Chest pain, unspecified type          ED COURSE & MEDICAL DECISION MAKING:    Pertinent Labs & Imaging studies reviewed. (See chart for details)  70 year old female presents to the Emergency Department for evaluation of chest pain and sob    ED Course as of 07/08/25 1546   Tue Jul 08, 2025   1223 Patient is a 62-year-old female with history of CAD prior NSTEMI, CKD, hypertension, hypothyroidism who presents emergency department for evaluation of chest pain.  For the least past few days patient is noted left-sided chest pain.  Denies clear exertional worsening of the pain but states when she is up moving around is having increased shortness of breath and feels like \"her heart is going to stop\" she has actually had some improvement in her lower extremity edema since initiation of diuretic a few weeks ago.  Has not had any nausea or vomiting.  Denies any diaphoresis.  No fevers or coughing.  No falls or injuries.    On exam here patient is well-appearing in no acute distress.  Hypertensive and saturating well on room air.  Lungs are clear without any wheezes or rales.  Abdomen soft nontender.  Trace bilateral pedal edema.  Warm well-perfused extremities    Initial differential includes but is not limited to ACS, pulmonary embolism, pneumonia or heart failure exacerbation   1223 EKG shows a sinus rhythm at 72 beats minute, normal axis, normal SD and QRS durations, no ST elevations or acute ischemic T wave changes   1408 Labs interpreted myself.  Patient does have a hemoglobin today of 7.4 most recently 1.83 months ago.  Upon further discussion with the patient she does " note.  A few weeks ago noticing some darker colored stool which is since resolved.  No bright red blood per rectum.  No hematemesis.  Baby aspirin daily but denies any other anticoagulation.  Additionally she does note some intermittent epigastric abdominal pain over the past few months.  D-dimer is positive and we will proceed with a CTA to eval for possible pulm embolism as well as CT abdomen pelvis for acute intra-abdominal cause of her epigastric pain.   1511 Given patient's hemoglobin drop as well as shortness of breath which I think may correlate with symptomatic anemia and anemia of unclear etiology thus far do believe she require mission to the hospital.  Discussed options of transfer in system given ongoing boarding crisis.  There is potentially a bed available at [facility] but patient and family do not want to transfer there and therefore plan to keep her here at [facility].   1513 Repeat troponin stable to downtrending at 41.   1543 Discussed with GI  and will potentially get a scope during this hospitalization.  Clear with liquid diet ordered.  Twice daily Protonix as well.    Discussed with hospitalist service pending CTA of the chest to evaluate for pulmonary embolism as well as CT abdomen pelvis.  Plan for admission to medical telemetry bed at this point in time.     12:24 PM I met and evaluated the patient.         Medical Decision Making  I obtained history from Family Member/Significant Other  Care impacted by Heart Disease  Admit.    MIPS (CTPE, Dental pain, Crowder, Sinusitis, Asthma/COPD, Head Trauma): CT Pulmonary Angiogram:The patient had an abnormal d-dimer.    SEPSIS: None           At the conclusion of the encounter I discussed the results of all of the tests and the disposition. The questions were answered. The patient or family acknowledged understanding and was agreeable with the care plan.     0 minutes of critical care time     MEDICATIONS GIVEN IN THE EMERGENCY:  Medications    pantoprazole (PROTONIX) injection 40 mg (has no administration in time range)   pantoprazole (PROTONIX) injection 40 mg (40 mg Intravenous $Given 25 0593)       NEW PRESCRIPTIONS STARTED AT TODAY'S ER VISIT  New Prescriptions    No medications on file          =================================================================    [facility]    Patient information was obtained from: Patient.    Use of : Yes (Phone) - Language Steven Muhammad is a 70 year old female with a pertinent history of hypertension, tachycardia, chest pain, CKD, CAD, NSTEMI, who presents to this ED by EMS for evaluation of chest pain.    The patient reports that she has been experiencing chest pain for ~3 weeks on the left side of her chest that she has never experienced before. She describes the pain as worsening and causing her to have difficulty sleeping at night. She saw a doctor in clinic today who recommended she be sent to the ED and while she is in the ED she reports her pain to come down to a 2. She describes the pain as coming and going stating she is unsure of the trigger. She reports that she feels fine when she is at rest but faces increased pain when exerting herself. She indicates that ~2 years ago she had a heart attack but her current symptoms did not feel like then.     She denies dysuria, DVT, allergy to medications, dizziness, and lightheadedness.                           Per chart review, on 25 at [facility], for follow up on cardiac concerns. Patient was started on lasix which improved swelling. Concerned for ACS and want it rule out due to worsening exertional symptoms. She was transferred to [facility] ED via EMS.     REVIEW OF SYSTEMS   Refer to the [facility]    PAST MEDICAL HISTORY:  Past Medical History:   Diagnosis Date    STEMI (ST elevation myocardial infarction) (H)        PAST SURGICAL HISTORY:  Past Surgical History:   Procedure Laterality Date     SECTION   32yrs ago    CV CORONARY ANGIOGRAM N/A 9/15/2023    Procedure: Coronary Angiogram;  Surgeon: Augie Farnsworth MD;  Location: [facility] CATH LAB CV    CV LEFT HEART CATH N/A 9/15/2023    Procedure: Left Heart Catheterization;  Surgeon: Augie Farnsworth MD;  Location: [facility] LAB CV    EYELID LACERATION REPAIR      LAPAROSCOPIC APPENDECTOMY      LAPAROSCOPIC CHOLECYSTECTOMY  06/2014           CURRENT MEDICATIONS:    acetaminophen (TYLENOL) 500 MG tablet  allopurinol (ZYLOPRIM) 100 MG tablet  aspirin 81 MG EC tablet  atorvastatin (LIPITOR) 40 MG tablet  buPROPion (WELLBUTRIN XL) 150 MG 24 hr tablet  cholecalciferol, vitamin D3, 2,000 unit cap  colchicine (COLCRYS) 0.6 MG tablet  furosemide (LASIX) 20 MG tablet  levothyroxine (SYNTHROID/LEVOTHROID) 50 MCG tablet  lisinopril (ZESTRIL) 20 MG tablet  Semaglutide-Weight Management (WEGOVY) 2.4 MG/0.75ML pen        ALLERGIES:  Allergies   Allergen Reactions    Celery Oil Other (See Comments)     Swelling, difficulty breathing    Doxycycline Hives    Gadavist [Gadobutrol] Other (See Comments) and Headache     Patient had sensitivity to the Gadavist on 6/17/2015.  She claimed she could not breath or swallow well.  After a few minutes she was fine.  Dr. Valente took her vitals and accessed she was fine and probably did not have a reaction.  Dr. Valente recommends the patient have MRI studies done in a hospital setting from now on.  Dr. Mae recommended on 6/18/2015 that the patient be pre-medicated if she needs MRI contrast in the future. - CAB    Other Allergy (See Comments) [External Allergen Needs Reconciliation - See Comment] Unknown     Other, 05/30/2013.  Action: [place].      Avocado Extract Allergy Skin Test Rash    Cashew Nut Oil Rash       FAMILY HISTORY:  No family history on file.    SOCIAL HISTORY:   Social History     Socioeconomic History    Marital status: Single   Tobacco Use    Smoking status: Never     Passive exposure: Never    Smokeless tobacco:  Never   Substance and Sexual Activity    Alcohol use: No    Drug use: No     Social Drivers of Health     Financial Resource Strain: Low Risk  (11/26/2024)    Financial Resource Strain     Within the past 12 months, have you or your family members you live with been unable to get utilities (heat, electricity) when it was really needed?: No   Food Insecurity: High Risk (11/26/2024)    Food Insecurity     Within the past 12 months, did you worry that your food would run out before you got money to buy more?: No     Within the past 12 months, did the food you bought just not last and you didn t have money to get more?: Yes   Transportation Needs: Low Risk  (11/26/2024)    Transportation Needs     Within the past 12 months, has lack of transportation kept you from medical appointments, getting your medicines, non-medical meetings or appointments, work, or from getting things that you need?: No   Physical Activity: Unknown (11/26/2024)    Exercise Vital Sign     Days of Exercise per Week: 7 days   Stress: Stress Concern Present (11/26/2024)    [facility] - Occupational Stress Questionnaire     Feeling of Stress : Rather much   Social Connections: Unknown (11/26/2024)    Social Connection and Isolation Panel [NHANES]     Frequency of Social Gatherings with Friends and Family: Never   Interpersonal Safety: Low Risk  (7/8/2025)    Interpersonal Safety     Do you feel physically and emotionally safe where you currently live?: Yes     Within the past 12 months, have you been hit, slapped, kicked or otherwise physically hurt by someone?: No     Within the past 12 months, have you been humiliated or emotionally abused in other ways by your partner or ex-partner?: No   Housing Stability: High Risk (11/26/2024)    Housing Stability     Do you have housing? : Yes     Are you worried about losing your housing?: Yes       VITALS:  /59   Pulse 68   Temp 97.7  F (36.5  C) (Oral)   Resp 17   LMP   (LMP Unknown)   SpO2 98%     PHYSICAL EXAM    Constitutional: Well developed, Well nourished, NAD.  HENT: Normocephalic, Atraumatic,  mucous membranes moist,   Neck- trachea midline, No stridor.    Eyes:EOMI, Conjunctiva normal, No discharge.   Respiratory: Normal breath sounds, No respiratory distress, No wheezing. No rales.   Cardiovascular: Normal heart rate, Regular rhythm, No murmurs. Trace pedal edema.    Abdominal: Minimal epigastric tenderness without any guarding or rebound  Musculoskeletal: no deformity or malalignment  Integument: Warm, Dry, No erythema  Neurologic: Alert & oriented x 3   Psychiatric: Affect normal, Cooperative.      LAB:  All pertinent labs reviewed and interpreted.  Results for orders placed or performed during the hospital encounter of 07/08/25   Basic metabolic panel   Result Value Ref Range    Sodium 144 135 - 145 mmol/L    Potassium 4.1 3.4 - 5.3 mmol/L    Chloride 112 (H) 98 - 107 mmol/L    Carbon Dioxide (CO2) 21 (L) 22 - 29 mmol/L    Anion Gap 11 7 - 15 mmol/L    Urea Nitrogen 42.6 (H) 8.0 - 23.0 mg/dL    Creatinine 1.35 (H) 0.51 - 0.95 mg/dL    GFR Estimate 43 (L) >60 mL/min/1.73m2    Calcium 9.1 8.8 - 10.4 mg/dL    Glucose 102 (H) 70 - 99 mg/dL   Result Value Ref Range    Troponin T, High Sensitivity 48 (H) <=14 ng/L   NT-proBNP   Result Value Ref Range    NT-proBNP 277 0 - 353 pg/mL   CBC with platelets and differential   Result Value Ref Range    WBC Count 8.9 4.0 - 11.0 10e3/uL    RBC Count 2.61 (L) 3.80 - 5.20 10e6/uL    Hemoglobin 7.4 (L) 11.7 - 15.7 g/dL    Hematocrit 25.5 (L) 35.0 - 47.0 %    MCV 98 78 - 100 fL    MCH 28.4 26.5 - 33.0 pg    MCHC 29.0 (L) 31.5 - 36.5 g/dL    RDW 16.2 (H) 10.0 - 15.0 %    Platelet Count 341 150 - 450 10e3/uL    % Neutrophils 62 %    % Lymphocytes 25 %    % Monocytes 10 %    % Eosinophils 2 %    % Basophils 0 %    % Immature Granulocytes 1 %    NRBCs per 100 WBC 0 <1 /100    Absolute Neutrophils 5.5 1.6 - 8.3 10e3/uL    Absolute  Lymphocytes 2.3 0.8 - 5.3 10e3/uL    Absolute Monocytes 0.9 0.0 - 1.3 10e3/uL    Absolute Eosinophils 0.1 0.0 - 0.7 10e3/uL    Absolute Basophils 0.0 0.0 - 0.2 10e3/uL    Absolute Immature Granulocytes 0.1 <=0.4 10e3/uL    Absolute NRBCs 0.0 10e3/uL   D dimer quantitative   Result Value Ref Range    D-Dimer Quantitative 3.96 (H) 0.00 - 0.50 ug/mL FEU   Hepatic function panel   Result Value Ref Range    Protein Total 6.3 (L) 6.4 - 8.3 g/dL    Albumin 3.7 3.5 - 5.2 g/dL    Bilirubin Total 0.4 <=1.2 mg/dL    Alkaline Phosphatase 76 40 - 150 U/L    AST 17 0 - 45 U/L    ALT 25 0 - 50 U/L    Bilirubin Direct 0.12 0.00 - 0.30 mg/dL   INR   Result Value Ref Range    INR 0.95 0.85 - 1.15    PT 13.0 11.8 - 14.8 Seconds   Partial thromboplastin time   Result Value Ref Range    aPTT 25 22 - 38 Seconds   Result Value Ref Range    Troponin T, High Sensitivity 41 (H) <=14 ng/L   Result Value Ref Range    Lipase 72 (H) 13 - 60 U/L   Adult Type and Screen   Result Value Ref Range    ABO/RH(D) B POS     Antibody Screen Negative Negative    SPECIMEN EXPIRATION DATE 7/11/2025 11:59:00 PM CDT        RADIOLOGY:  Reviewed all pertinent imaging. Please see official radiology report.  CT Chest Pulmonary Embolism w Contrast    (Results Pending)   CT Abdomen Pelvis w Contrast    (Results Pending)       EKG:    Performed at: 07/08/25 11:56:20    Impression: EKG shows a sinus rhythm at 72 beats minute, normal axis, normal [place] and QRS durations, no ST elevations or acute ischemic T wave changes      I have independently reviewed and interpreted the EKG(s) documented above.    PROCEDURES:         [employer] System Documentation:   CMS Diagnoses:              I, Jessica Lyons, am serving as a scribe to document services personally performed by Mata Mares MD based on my observation and the provider's statements to me. I, Mata Mares MD, attest that Jessica Lyons is acting in a scribe capacity, has observed my performance of the services  and has documented them in accordance with my direction.    Mata Mares MD  [facility] EMERGENCY DEPARTMENT  [facility] [facility] [id number] [phone]      Mata Mares MD  07/08/25 8755

## 2025-07-08 NOTE — PHARMACY-ADMISSION MEDICATION HISTORY
Pharmacist Admission Medication History    Admission medication history is complete. The information provided in this note is only as accurate as the sources available at the time of the update.    Information Source(s): Patient via in-person    Pertinent Information: Patient stated that she takes Wellbutrin XL on an as needed basis only (if having trouble with sleep )     Changes made to PTA medication list:  Added: None  Deleted: Wegovy (stopped per patient)   Changed: Wellbutrin  mg daily to daily as needed.     Allergies reviewed with patient and updates made in EHR: yes    Medication History Completed By: Jadiel Venegas RPH 7/8/2025 4:18 PM    PTA Med List   Medication Sig Last Dose/Taking    acetaminophen (TYLENOL) 500 MG tablet Take 1-2 tablets (500-1,000 mg) by mouth every 6 hours as needed for mild pain 7/7/2025 Evening    allopurinol (ZYLOPRIM) 100 MG tablet Take 1 tablet (100 mg) by mouth daily. 7/8/2025 Morning    aspirin 81 MG EC tablet Take 1 tablet (81 mg) by mouth daily. 7/8/2025 Morning    atorvastatin (LIPITOR) 40 MG tablet Take 1 tablet (40 mg) by mouth every evening. 7/7/2025 Evening    buPROPion (WELLBUTRIN XL) 150 MG 24 hr tablet Take 150 mg by mouth daily as needed. Past Month Morning    colchicine (COLCRYS) 0.6 MG tablet Take 1 tablet (0.6 mg) by mouth daily. 7/8/2025 Morning    furosemide (LASIX) 20 MG tablet Take 1 tablet (20 mg) by mouth daily. 7/8/2025 Morning    levothyroxine (SYNTHROID/LEVOTHROID) 50 MCG tablet Take 1 tablet (50 mcg) by mouth daily. 7/8/2025 Morning    lisinopril (ZESTRIL) 20 MG tablet Take 1 tablet (20 mg) by mouth 2 times daily. 7/8/2025 Morning    vitamin D3 (CHOLECALCIFEROL) 50 mcg (2000 units) tablet Take 1 tablet by mouth every evening. 7/7/2025 Evening

## 2025-07-09 ENCOUNTER — APPOINTMENT (OUTPATIENT)
Dept: ULTRASOUND IMAGING | Facility: HOSPITAL | Age: 70
End: 2025-07-09
Payer: COMMERCIAL

## 2025-07-09 ENCOUNTER — ANESTHESIA (OUTPATIENT)
Dept: SURGERY | Facility: HOSPITAL | Age: 70
End: 2025-07-09
Payer: COMMERCIAL

## 2025-07-09 ENCOUNTER — ANESTHESIA EVENT (OUTPATIENT)
Dept: SURGERY | Facility: HOSPITAL | Age: 70
End: 2025-07-09
Payer: COMMERCIAL

## 2025-07-09 LAB
ANION GAP SERPL CALCULATED.3IONS-SCNC: 11 MMOL/L (ref 7–15)
BLD PROD TYP BPU: NORMAL
BLOOD COMPONENT TYPE: NORMAL
BUN SERPL-MCNC: 31.5 MG/DL (ref 8–23)
CALCIUM SERPL-MCNC: 8.8 MG/DL (ref 8.8–10.4)
CHLORIDE SERPL-SCNC: 115 MMOL/L (ref 98–107)
CODING SYSTEM: NORMAL
CREAT SERPL-MCNC: 1.41 MG/DL (ref 0.51–0.95)
CROSSMATCH: NORMAL
EGFRCR SERPLBLD CKD-EPI 2021: 40 ML/MIN/1.73M2
ERYTHROCYTE [DISTWIDTH] IN BLOOD BY AUTOMATED COUNT: 16.1 % (ref 10–15)
GLUCOSE BLDC GLUCOMTR-MCNC: 90 MG/DL (ref 70–99)
GLUCOSE SERPL-MCNC: 95 MG/DL (ref 70–99)
HCO3 SERPL-SCNC: 21 MMOL/L (ref 22–29)
HCT VFR BLD AUTO: 23.8 % (ref 35–47)
HGB BLD-MCNC: 6.9 G/DL (ref 11.7–15.7)
HGB BLD-MCNC: 8.3 G/DL (ref 11.7–15.7)
HOLD SPECIMEN: NORMAL
ISSUE DATE AND TIME: NORMAL
MCH RBC QN AUTO: 28.5 PG (ref 26.5–33)
MCHC RBC AUTO-ENTMCNC: 29 G/DL (ref 31.5–36.5)
MCV RBC AUTO: 96 FL (ref 78–100)
MCV RBC AUTO: 98 FL (ref 78–100)
PLATELET # BLD AUTO: 293 10E3/UL (ref 150–450)
POTASSIUM SERPL-SCNC: 3.9 MMOL/L (ref 3.4–5.3)
RBC # BLD AUTO: 2.42 10E6/UL (ref 3.8–5.2)
SODIUM SERPL-SCNC: 147 MMOL/L (ref 135–145)
UFH PPP CHRO-ACNC: 1.03 IU/ML (ref ?–1.1)
UFH PPP CHRO-ACNC: <0.1 IU/ML (ref ?–1.1)
UFH PPP CHRO-ACNC: >1.1 IU/ML (ref ?–1.1)
UNIT ABO/RH: NORMAL
UNIT NUMBER: NORMAL
UNIT STATUS: NORMAL
UNIT TYPE ISBT: 7300
UPPER GI ENDOSCOPY: NORMAL
WBC # BLD AUTO: 6.4 10E3/UL (ref 4–11)

## 2025-07-09 PROCEDURE — 272N000001 HC OR GENERAL SUPPLY STERILE: Performed by: INTERNAL MEDICINE

## 2025-07-09 PROCEDURE — 85018 HEMOGLOBIN: CPT

## 2025-07-09 PROCEDURE — 80048 BASIC METABOLIC PNL TOTAL CA: CPT

## 2025-07-09 PROCEDURE — 36415 COLL VENOUS BLD VENIPUNCTURE: CPT

## 2025-07-09 PROCEDURE — P9016 RBC LEUKOCYTES REDUCED: HCPCS

## 2025-07-09 PROCEDURE — 120N000001 HC R&B MED SURG/OB

## 2025-07-09 PROCEDURE — 258N000003 HC RX IP 258 OP 636: Performed by: NURSE ANESTHETIST, CERTIFIED REGISTERED

## 2025-07-09 PROCEDURE — 250N000009 HC RX 250: Performed by: NURSE ANESTHETIST, CERTIFIED REGISTERED

## 2025-07-09 PROCEDURE — 710N000012 HC RECOVERY PHASE 2, PER MINUTE: Performed by: INTERNAL MEDICINE

## 2025-07-09 PROCEDURE — 250N000011 HC RX IP 250 OP 636: Performed by: NURSE ANESTHETIST, CERTIFIED REGISTERED

## 2025-07-09 PROCEDURE — 85014 HEMATOCRIT: CPT

## 2025-07-09 PROCEDURE — 370N000017 HC ANESTHESIA TECHNICAL FEE, PER MIN: Performed by: INTERNAL MEDICINE

## 2025-07-09 PROCEDURE — 85520 HEPARIN ASSAY: CPT | Performed by: FAMILY MEDICINE

## 2025-07-09 PROCEDURE — 36415 COLL VENOUS BLD VENIPUNCTURE: CPT | Performed by: FAMILY MEDICINE

## 2025-07-09 PROCEDURE — 258N000003 HC RX IP 258 OP 636

## 2025-07-09 PROCEDURE — 0DJ08ZZ INSPECTION OF UPPER INTESTINAL TRACT, VIA NATURAL OR ARTIFICIAL OPENING ENDOSCOPIC: ICD-10-PCS | Performed by: INTERNAL MEDICINE

## 2025-07-09 PROCEDURE — 250N000013 HC RX MED GY IP 250 OP 250 PS 637: Performed by: FAMILY MEDICINE

## 2025-07-09 PROCEDURE — 250N000011 HC RX IP 250 OP 636: Performed by: FAMILY MEDICINE

## 2025-07-09 PROCEDURE — 93970 EXTREMITY STUDY: CPT

## 2025-07-09 PROCEDURE — 999N000141 HC STATISTIC PRE-PROCEDURE NURSING ASSESSMENT: Performed by: INTERNAL MEDICINE

## 2025-07-09 PROCEDURE — 360N000075 HC SURGERY LEVEL 2, PER MIN: Performed by: INTERNAL MEDICINE

## 2025-07-09 PROCEDURE — 99223 1ST HOSP IP/OBS HIGH 75: CPT | Mod: AI

## 2025-07-09 RX ORDER — ONDANSETRON 2 MG/ML
4 INJECTION INTRAMUSCULAR; INTRAVENOUS EVERY 30 MIN PRN
Status: CANCELLED | OUTPATIENT
Start: 2025-07-09

## 2025-07-09 RX ORDER — SODIUM CHLORIDE, SODIUM LACTATE, POTASSIUM CHLORIDE, CALCIUM CHLORIDE 600; 310; 30; 20 MG/100ML; MG/100ML; MG/100ML; MG/100ML
INJECTION, SOLUTION INTRAVENOUS CONTINUOUS
Status: CANCELLED | OUTPATIENT
Start: 2025-07-09

## 2025-07-09 RX ORDER — HYDROMORPHONE HCL IN WATER/PF 6 MG/30 ML
0.4 PATIENT CONTROLLED ANALGESIA SYRINGE INTRAVENOUS EVERY 5 MIN PRN
Refills: 0 | Status: CANCELLED | OUTPATIENT
Start: 2025-07-09

## 2025-07-09 RX ORDER — EPINEPHRINE 1 MG/ML
0.1 INJECTION, SOLUTION INTRAMUSCULAR; SUBCUTANEOUS
Status: DISCONTINUED | OUTPATIENT
Start: 2025-07-09 | End: 2025-07-09 | Stop reason: HOSPADM

## 2025-07-09 RX ORDER — SODIUM CHLORIDE, SODIUM LACTATE, POTASSIUM CHLORIDE, CALCIUM CHLORIDE 600; 310; 30; 20 MG/100ML; MG/100ML; MG/100ML; MG/100ML
INJECTION, SOLUTION INTRAVENOUS CONTINUOUS PRN
Status: DISCONTINUED | OUTPATIENT
Start: 2025-07-09 | End: 2025-07-09

## 2025-07-09 RX ORDER — NALOXONE HYDROCHLORIDE 0.4 MG/ML
0.1 INJECTION, SOLUTION INTRAMUSCULAR; INTRAVENOUS; SUBCUTANEOUS
Status: CANCELLED | OUTPATIENT
Start: 2025-07-09

## 2025-07-09 RX ORDER — FLUMAZENIL 0.1 MG/ML
0.2 INJECTION, SOLUTION INTRAVENOUS
Status: ACTIVE | OUTPATIENT
Start: 2025-07-09 | End: 2025-07-09

## 2025-07-09 RX ORDER — LIDOCAINE HYDROCHLORIDE 10 MG/ML
INJECTION, SOLUTION INFILTRATION; PERINEURAL PRN
Status: DISCONTINUED | OUTPATIENT
Start: 2025-07-09 | End: 2025-07-09

## 2025-07-09 RX ORDER — SODIUM CHLORIDE, SODIUM LACTATE, POTASSIUM CHLORIDE, CALCIUM CHLORIDE 600; 310; 30; 20 MG/100ML; MG/100ML; MG/100ML; MG/100ML
INJECTION, SOLUTION INTRAVENOUS CONTINUOUS
Status: DISCONTINUED | OUTPATIENT
Start: 2025-07-09 | End: 2025-07-09 | Stop reason: HOSPADM

## 2025-07-09 RX ORDER — LIDOCAINE 40 MG/G
CREAM TOPICAL
Status: DISCONTINUED | OUTPATIENT
Start: 2025-07-09 | End: 2025-07-09 | Stop reason: HOSPADM

## 2025-07-09 RX ORDER — ONDANSETRON 2 MG/ML
4 INJECTION INTRAMUSCULAR; INTRAVENOUS EVERY 30 MIN PRN
Status: DISCONTINUED | OUTPATIENT
Start: 2025-07-09 | End: 2025-07-09 | Stop reason: HOSPADM

## 2025-07-09 RX ORDER — FLUMAZENIL 0.1 MG/ML
0.2 INJECTION, SOLUTION INTRAVENOUS
Status: DISCONTINUED | OUTPATIENT
Start: 2025-07-09 | End: 2025-07-09 | Stop reason: HOSPADM

## 2025-07-09 RX ORDER — ATROPINE SULFATE 0.1 MG/ML
1 INJECTION INTRAVENOUS
Status: DISCONTINUED | OUTPATIENT
Start: 2025-07-09 | End: 2025-07-09 | Stop reason: HOSPADM

## 2025-07-09 RX ORDER — NALOXONE HYDROCHLORIDE 0.4 MG/ML
0.2 INJECTION, SOLUTION INTRAMUSCULAR; INTRAVENOUS; SUBCUTANEOUS
Status: DISCONTINUED | OUTPATIENT
Start: 2025-07-09 | End: 2025-07-09 | Stop reason: HOSPADM

## 2025-07-09 RX ORDER — PROPOFOL 10 MG/ML
INJECTION, EMULSION INTRAVENOUS PRN
Status: DISCONTINUED | OUTPATIENT
Start: 2025-07-09 | End: 2025-07-09

## 2025-07-09 RX ORDER — NALOXONE HYDROCHLORIDE 0.4 MG/ML
0.4 INJECTION, SOLUTION INTRAMUSCULAR; INTRAVENOUS; SUBCUTANEOUS
Status: DISCONTINUED | OUTPATIENT
Start: 2025-07-09 | End: 2025-07-09 | Stop reason: HOSPADM

## 2025-07-09 RX ORDER — SODIUM CHLORIDE, SODIUM LACTATE, POTASSIUM CHLORIDE, CALCIUM CHLORIDE 600; 310; 30; 20 MG/100ML; MG/100ML; MG/100ML; MG/100ML
INJECTION, SOLUTION INTRAVENOUS CONTINUOUS
Status: DISCONTINUED | OUTPATIENT
Start: 2025-07-09 | End: 2025-07-11

## 2025-07-09 RX ORDER — SIMETHICONE 40MG/0.6ML
133 SUSPENSION, DROPS(FINAL DOSAGE FORM)(ML) ORAL
Status: DISCONTINUED | OUTPATIENT
Start: 2025-07-09 | End: 2025-07-09 | Stop reason: HOSPADM

## 2025-07-09 RX ORDER — HYDROMORPHONE HCL IN WATER/PF 6 MG/30 ML
0.2 PATIENT CONTROLLED ANALGESIA SYRINGE INTRAVENOUS EVERY 5 MIN PRN
Refills: 0 | Status: CANCELLED | OUTPATIENT
Start: 2025-07-09

## 2025-07-09 RX ORDER — FENTANYL CITRATE 50 UG/ML
50 INJECTION, SOLUTION INTRAMUSCULAR; INTRAVENOUS EVERY 5 MIN PRN
Refills: 0 | Status: CANCELLED | OUTPATIENT
Start: 2025-07-09

## 2025-07-09 RX ORDER — OXYCODONE HYDROCHLORIDE 5 MG/1
10 TABLET ORAL
Status: DISCONTINUED | OUTPATIENT
Start: 2025-07-09 | End: 2025-07-09 | Stop reason: HOSPADM

## 2025-07-09 RX ORDER — ONDANSETRON 4 MG/1
4 TABLET, ORALLY DISINTEGRATING ORAL EVERY 30 MIN PRN
Status: CANCELLED | OUTPATIENT
Start: 2025-07-09

## 2025-07-09 RX ORDER — DEXAMETHASONE SODIUM PHOSPHATE 10 MG/ML
4 INJECTION, SOLUTION INTRAMUSCULAR; INTRAVENOUS
Status: CANCELLED | OUTPATIENT
Start: 2025-07-09

## 2025-07-09 RX ORDER — NALOXONE HYDROCHLORIDE 1 MG/ML
0.1 INJECTION INTRAMUSCULAR; INTRAVENOUS; SUBCUTANEOUS
Status: DISCONTINUED | OUTPATIENT
Start: 2025-07-09 | End: 2025-07-09 | Stop reason: HOSPADM

## 2025-07-09 RX ORDER — FENTANYL CITRATE 50 UG/ML
25 INJECTION, SOLUTION INTRAMUSCULAR; INTRAVENOUS EVERY 5 MIN PRN
Refills: 0 | Status: CANCELLED | OUTPATIENT
Start: 2025-07-09

## 2025-07-09 RX ORDER — DEXAMETHASONE SODIUM PHOSPHATE 4 MG/ML
4 INJECTION, SOLUTION INTRA-ARTICULAR; INTRALESIONAL; INTRAMUSCULAR; INTRAVENOUS; SOFT TISSUE
Status: DISCONTINUED | OUTPATIENT
Start: 2025-07-09 | End: 2025-07-09 | Stop reason: HOSPADM

## 2025-07-09 RX ORDER — ONDANSETRON 4 MG/1
4 TABLET, ORALLY DISINTEGRATING ORAL EVERY 30 MIN PRN
Status: DISCONTINUED | OUTPATIENT
Start: 2025-07-09 | End: 2025-07-09 | Stop reason: HOSPADM

## 2025-07-09 RX ORDER — PROPOFOL 10 MG/ML
INJECTION, EMULSION INTRAVENOUS CONTINUOUS PRN
Status: DISCONTINUED | OUTPATIENT
Start: 2025-07-09 | End: 2025-07-09

## 2025-07-09 RX ORDER — OXYCODONE HYDROCHLORIDE 5 MG/1
5 TABLET ORAL
Status: DISCONTINUED | OUTPATIENT
Start: 2025-07-09 | End: 2025-07-09 | Stop reason: HOSPADM

## 2025-07-09 RX ORDER — DIPHENHYDRAMINE HYDROCHLORIDE 50 MG/ML
25-50 INJECTION, SOLUTION INTRAMUSCULAR; INTRAVENOUS
Status: DISCONTINUED | OUTPATIENT
Start: 2025-07-09 | End: 2025-07-09 | Stop reason: HOSPADM

## 2025-07-09 RX ADMIN — ATORVASTATIN CALCIUM 40 MG: 40 TABLET, FILM COATED ORAL at 19:59

## 2025-07-09 RX ADMIN — LIDOCAINE HYDROCHLORIDE 5 ML: 10 INJECTION, SOLUTION INFILTRATION; PERINEURAL at 09:43

## 2025-07-09 RX ADMIN — SODIUM CHLORIDE, SODIUM LACTATE, POTASSIUM CHLORIDE, AND CALCIUM CHLORIDE: .6; .31; .03; .02 INJECTION, SOLUTION INTRAVENOUS at 09:40

## 2025-07-09 RX ADMIN — SODIUM CHLORIDE, SODIUM LACTATE, POTASSIUM CHLORIDE, AND CALCIUM CHLORIDE: .6; .31; .03; .02 INJECTION, SOLUTION INTRAVENOUS at 14:44

## 2025-07-09 RX ADMIN — SODIUM CHLORIDE, SODIUM LACTATE, POTASSIUM CHLORIDE, AND CALCIUM CHLORIDE: .6; .31; .03; .02 INJECTION, SOLUTION INTRAVENOUS at 19:09

## 2025-07-09 RX ADMIN — PANTOPRAZOLE SODIUM 40 MG: 40 INJECTION, POWDER, FOR SOLUTION INTRAVENOUS at 19:50

## 2025-07-09 RX ADMIN — PROPOFOL 40 MG: 10 INJECTION, EMULSION INTRAVENOUS at 09:43

## 2025-07-09 RX ADMIN — PROPOFOL 150 MCG/KG/MIN: 10 INJECTION, EMULSION INTRAVENOUS at 09:43

## 2025-07-09 RX ADMIN — Medication 50 MCG: at 19:59

## 2025-07-09 RX ADMIN — PHENYLEPHRINE HYDROCHLORIDE 200 MCG: 10 INJECTION INTRAVENOUS at 09:54

## 2025-07-09 ASSESSMENT — ACTIVITIES OF DAILY LIVING (ADL)
ADLS_ACUITY_SCORE: 51
ADLS_ACUITY_SCORE: 31
ADLS_ACUITY_SCORE: 51
ADLS_ACUITY_SCORE: 31
ADLS_ACUITY_SCORE: 55
ADLS_ACUITY_SCORE: 51
ADLS_ACUITY_SCORE: 31
ADLS_ACUITY_SCORE: 51
ADLS_ACUITY_SCORE: 31
ADLS_ACUITY_SCORE: 51
ADLS_ACUITY_SCORE: 31
ADLS_ACUITY_SCORE: 31
ADLS_ACUITY_SCORE: 51
ADLS_ACUITY_SCORE: 31
ADLS_ACUITY_SCORE: 51
ADLS_ACUITY_SCORE: 51
ADLS_ACUITY_SCORE: 31

## 2025-07-09 NOTE — PROGRESS NOTES
Sounds like some miscommunication between IR and family.  D/W family and they are in agreement to proceed with filter ASAP.  Pt NPO since 11am this morning.  Awaiting call back from IR.  Paged at [phone].

## 2025-07-09 NOTE — PLAN OF CARE
Problem: Adult Inpatient Plan of Care  Goal: Plan of Care Review  Description: The Plan of Care Review/Shift note should be completed every shift.  The Outcome Evaluation is a brief statement about your assessment that the patient is improving, declining, or no change.  This information will be displayed automatically on your shift  note.  Outcome: Progressing  Flowsheets (Taken 7/8/2025 2238)  Plan of Care Reviewed With: patient  Overall Patient Progress: improving   Goal Outcome Evaluation:      Plan of Care Reviewed With: patient    Overall Patient Progress: improvingOverall Patient Progress: improving           Alert and oriented. Admitted inpatient with melena.  Patient will be NPO at 0000 for EGG scheduled for 11 am tomorrow. Heparin drip currently running at a rate of 1450 units/hr, anti xa scheduled for 2330.

## 2025-07-09 NOTE — ANESTHESIA POSTPROCEDURE EVALUATION
Patient: Jamee Muhammad    Procedure: Procedure(s):  DIAGNOSTIC ESOPHAGOGASTRODUODENOSCOPY       Anesthesia Type:  MAC    Note:  Disposition: Outpatient   Postop Pain Control: Uneventful            Sign Out: Well controlled pain   PONV: No   Neuro/Psych: Uneventful            Sign Out: Acceptable/Baseline neuro status   Airway/Respiratory: Uneventful            Sign Out: Acceptable/Baseline resp. status   CV/Hemodynamics: Uneventful            Sign Out: Acceptable CV status; No obvious hypovolemia; No obvious fluid overload   Other NRE: NONE   DID A NON-ROUTINE EVENT OCCUR? No           Last vitals:  Vitals Value Taken Time   /56 07/09/25 11:30   Temp 36.1  C (97  F) 07/09/25 10:45   Pulse 74 07/09/25 11:43   Resp 16 07/09/25 10:45   SpO2 94 % 07/09/25 11:43   Vitals shown include unfiled device data.    Electronically Signed By: Monique Reece MD  July 9, 2025  11:46 AM

## 2025-07-09 NOTE — PROGRESS NOTES
Pt a/o, reports mild h/a 4/10. Dr Murphy notified of hemoglobin 6.9. up to bathroom w/ stand by assist. Respirations easy on room air at this time, o2 sat 98% . Pt's daughter present at bedside. Heparin gtt paused after recent blood draw anti xa results per protocol. Pt transporting at this time to surgery pre-op for EGD. Report given to Susana Leon. Daughter accompanying her to surgery area.

## 2025-07-09 NOTE — CONSULTS
Interventional Radiology - Pre-Procedure Note:  Inpatient - [facility]  07/09/2025     Procedure Requested: IVC Filter Placement  Requested by: Park Ellison MD      Brief HPI: Jamee Muhammad is a 70 year old female with PMHx significant for CAD, CKD3a, HTN, and hypothyroidism admitted on 7/8/2025 for chest pain, found to have R-sided PE. EGD 7/9 showing large non-bleeding ulcers. Anticoagulation c/b UGIB and acute blood loss anemia.     Contraindication to anticoagulation in setting of R-sided PE and large gastric ulcer w/acute blood loss anemia. IVC filter placement being requested.      IMAGING:   Narrative & Impression   EXAM: US LOWER EXTREMITY VENOUS DUPLEX BILATERAL  LOCATION: [facility]  DATE: 7/9/2025     INDICATION: R sided PE, r o DVT. Determing if patient needs IVC filter.  COMPARISON: None.  TECHNIQUE: Venous Duplex ultrasound of bilateral lower extremities with and without compression, augmentation and duplex. Color flow and spectral Doppler with waveform analysis performed.     FINDINGS: Exam includes the common femoral, femoral, popliteal veins as well as segmentally visualized deep calf veins and greater saphenous vein.      RIGHT: No deep vein thrombosis. No superficial thrombophlebitis. No popliteal cyst.     LEFT: Nonocclusive DVT from the common femoral vein to the popliteal vein. This subsequently becomes occlusive distal to this in the peroneal and posterior tibial veins. No superficial thrombophlebitis. No popliteal cyst.                                                                      IMPRESSION:  1.  Long segment DVT of the left lower extremity from the common femoral vein to the calf veins.  2.  No right lower extremity DVT.        Narrative & Impression   EXAM: CT ABDOMEN PELVIS W CONTRAST  LOCATION: [facility]  DATE: 7/8/2025     INDICATION: Epigastric abdominal pain  COMPARISON: CT 5/14/2014  TECHNIQUE: CT scan  of the abdomen and pelvis was performed following injection of IV contrast. Multiplanar reformats were obtained. Dose reduction techniques were used.  CONTRAST: 75 mL Isovue 370     FINDINGS:   LOWER CHEST: Bibasilar atelectasis.     HEPATOBILIARY: Cholecystectomy. Mild hepatic steatosis. No biliary ductal dilatation.     PANCREAS: Normal.     SPLEEN: Normal.     ADRENAL GLANDS: Normal.     KIDNEYS/BLADDER: Normal.     BOWEL: No evidence for bowel obstruction or inflammatory changes. No gastric wall thickening or inflammation. Prior appendectomy.     LYMPH NODES: No lymphadenopathy.     VASCULATURE: Normal.     PELVIC ORGANS: No adnexal lesions or free fluid.     MUSCULOSKELETAL: Postoperative changes lower anterior abdominal wall likely due to prior hernia repair. Compression fracture deformity involving the superior endplate of the L4 vertebral body with sclerosis is new since the prior CT.                                                                      IMPRESSION:   1.  No evidence for bowel obstruction or inflammatory changes.  2.  Cholecystectomy.  3.  Mild hepatic steatosis.  4.  Chronic appearing compression fracture deformity superior endplate of L4 vertebral body.       Narrative & Impression   EXAM: CT CHEST PULMONARY EMBOLISM W CONTRAST  LOCATION: [facility]  DATE: 7/8/2025     INDICATION: SOB, chest pain, positive D-dimer. Evaluate for PE.  COMPARISON: CT 9/15/2023.  TECHNIQUE: CT chest pulmonary angiogram during arterial phase injection of IV contrast. Multiplanar reformats and MIP reconstructions were performed. Dose reduction techniques were used.   CONTRAST: 75 mL isovue 370     FINDINGS:  ANGIOGRAM CHEST: Examination is positive for right-sided pulmonary embolism. There is nonocclusive clot adherent to the wall at the bifurcation of the right main pulmonary artery with extension into the lobar segments. The clot burden is mild. No   left-sided emboli. Thoracic aorta is  negative for dissection. No CT evidence of right heart strain.     LUNGS AND PLEURA: Low lung volumes with bibasilar atelectasis. No evidence for pulmonary consolidation or infarct. No pleural fluid. 4 mm nodule in the right upper lobe remains stable and should be benign.     MEDIASTINUM/AXILLAE: Normal.     CORONARY ARTERY CALCIFICATION: Mild.     UPPER ABDOMEN: Cholecystectomy. Mild hepatic steatosis.     MUSCULOSKELETAL: Normal.                                                                      IMPRESSION:  1.  Examination is positive for right-sided pulmonary emboli. The clot burden is mild. No left-sided emboli.  2.  No evidence for pulmonary infarct or right heart strain.  3.  Low lung volumes with bibasilar atelectasis.  4.  Stable 4 mm nodule right upper lobe should be benign.  5.  Hepatic steatosis.          NPO: last ate around 1230 7/9/2025   ANTICOAGULANTS: -Aspirin 81 mg PO daily - No hold required per SIR guidelines.   ANTIBIOTICS: None required or indicated for IR procedure per SIR guidelines.  GLP-1 Agonist: None.    ALLERGIES  Allergies   Allergen Reactions    Celery Oil Other (See Comments)     Swelling, difficulty breathing    Doxycycline Hives    Gadavist [Gadobutrol] Other (See Comments) and Headache     Patient had sensitivity to the Gadavist on 6/17/2015.  She claimed she could not breath or swallow well.  After a few minutes she was fine.  Dr. Valente took her vitals and accessed she was fine and probably did not have a reaction.  Dr. Valente recommends the patient have MRI studies done in a hospital setting from now on.  Dr. Mae recommended on 6/18/2015 that the patient be pre-medicated if she needs MRI contrast in the future. - CAB    Other Allergy (See Comments) [External Allergen Needs Reconciliation - See Comment] Unknown     Other, 05/30/2013.  Action: [place].      Avocado Extract Allergy Skin Test Rash    Cashew Nut Oil Rash       LABS:  INR   Date Value Ref Range Status    07/08/2025 0.95 0.85 - 1.15 Final      Hemoglobin   Date Value Ref Range Status   07/09/2025 8.3 (L) 11.7 - 15.7 g/dL Final     Platelet Count   Date Value Ref Range Status   07/09/2025 293 150 - 450 10e3/uL Final     Creatinine   Date Value Ref Range Status   07/09/2025 1.41 (H) 0.51 - 0.95 mg/dL Final     Potassium   Date Value Ref Range Status   07/09/2025 3.9 3.4 - 5.3 mmol/L Final   06/24/2025 5.4 (H) 3.4 - 5.3 mmol/L Final       EXAM:  /62 (BP Location: Right arm)   Pulse 66   Temp 98.3  F (36.8  C) (Oral)   Resp 16   LMP  (LMP Unknown)   SpO2 97%     General: Stable. In no acute distress.    Neuro: Alert and oriented x 3. Speech clear. No focal deficits.  Psych: Appropriate mood and affect. Cooperative. Answering questions appropriately.  Resp: Normal respirations. Unlabored breathing. Lungs clear to auscultation bilaterally.  Cardio: S1S2, regular rate and rhythm, without murmur, clicks or rubs  Skin: Warm and dry. Without excoriations, ecchymosis, erythema, lesions or open sores on upper chest and neck.      Pre-Sedation Assessment:  Fentanyl only   Code Status: Full Code         ASSESSMENT/PLAN:   #R-sided PE   #large gastric ulcer w/acute blood loss anemia  #Contraindication to anticoagulation  #LLE DVT - patient denies pain      Hospitalist requesting procedure tonight given US with LLE clot.     Procedural education reviewed with patient, spouse, and daughter (via phone) in detail including, but not limited to risks, benefits and alternatives with understanding verbalized by patient/family. Patient's daughter wanted to further discuss with her siblings. Patient wanting to wait until children are in agreement to IVC filter placement before proceeding.     Updated evening Hospitalist about patient needing to further discuss procedure with family. Please let IR know if patient wanting to proceed with IVC filter placement.     Radiologist to formally consent patient if she decides to proceed.      IVC filter devices can be left in permanently in some instances, but typically recommend remvoal within 3 months of placement if it is no longer adding benefit to patient. Recommend follow up with primary care provider ONE MONTH from filter placement date to determine if filter should be removed.      Total time spent on the date of the encounter: 35 minutes.    CHRISTOPHER Aggarwal, CNP  Interventional Radiology   [phone]

## 2025-07-09 NOTE — PROGRESS NOTES
[facility]    Medicine Progress Note - Hospitalist Service    Date of Admission:  7/8/2025    Assessment & Plan      Jamee Muhammad is a 70 year old female w/PMHx significant for CAD, CKD3a, HTN, and hypothyroidism admitted on 7/8/2025 for chest pain, fth R-sided PE. EGD 7/9 showing large non-bleeding ulcers.     Chest pain, likely 2/2 R-sided PE  Presents w/3 weeks of intermittent episodes of intense fatigue and sharp chest pain that significantly worsened overnight. D-dimer significantly elevated, so CT PE was pursued showing R-sided PE w/o evidence of RH strain. Unclear trigger - no recent travel, no known malignancy. Daughter does report she has been more immobile d/t the chest pain. ACS workup negative - EKG NSR, trops downtrending, BNP wnl. Lipase wnl. Unfortunately, anticoagulation c/b UGIB and acute blood loss anemia. Would be strong candidate for IVC filter, but patient would like to discuss risks/benefits w/IR and family first.  -Heparin load + gtt  -DVT US  -IR consultf for IVC filter, recs appreciated   -NPO at midnight in case of procedure     Acute blood loss anemia 2/2 gastric ulcers  Hgb downtrended to 6.9, baseline ~11. Reports sharp epigastric pain, mixed reports of melena and endorses exposure to H pylori. EGD 7/7 showed gastric ulcers as large as 30mm - no active bleeding, but scattered hematin suggestive of recent bleed. Bx not taken d/t anticoagulation. Per discussion w/GI they are concerned the bleeding will worsen and ulceration will be irreparable if anticoagulation is continued.  -1u pRBC  -Recheck hgb   -LR mIVF @ 100mL/hr  -CBC daily, transfuse for hgb < 7  -GI consult, recs appreciated   -Protonix 40mg IV BID   -ADAT   -Hold anticoagulation for 72h, consider IVC filter    -H pylori stool testing - start treatment empirically once sample obtained    -Repeat EGD in 2-3mo to confirm healing     MESFIN on CKD3a  HTN  Cr uptrended to 1.41, baseline ~1.2. Likely prerenal in  "setting of acute blood loss anemia.   -mIVF per above   -Hold PTA lasix and lisinopril  -Trend BMP    Chronic conditions:  CAD -  hold PTA ASA, continue atorvastatin   Hypothyroidism - continue PTA levothryoxine 50mcg d   MDD - continue Wellbutrin          Diet: NPO for Procedure/Surgery per Anesthesia Guidelines Except for: Meds; Clear liquids before procedure/surgery: ADULT (Age GREATER than or Equal to 18 years) - Clear liquids 2 hours before procedure/surgery    DVT Prophylaxis: heparin gtt   Crowder Catheter: Not present  Lines: None     Cardiac Monitoring: None  Code Status: Full Code      Clinically Significant Risk Factors Present on Admission         # Hypernatremia: Highest Na = 147 mmol/L in last 2 days, will monitor as appropriate  # Hyperchloremia: Highest Cl = 115 mmol/L in last 2 days, will monitor as appropriate            # Drug Induced Platelet Defect: home medication list includes an antiplatelet medication   # Hypertension: Noted on problem list      # Anemia: based on hgb <11  # Anemia: based on hgb <11       # Obesity: Estimated body mass index is 38.09 kg/m  as calculated from the following:    Height as of an earlier encounter on 7/8/25: 1.448 m (4' 9\").    Weight as of an earlier encounter on 7/8/25: 79.8 kg (176 lb).       # Asthma: noted on problem list        Social Drivers of Health    Food Insecurity: High Risk (11/26/2024)    Food Insecurity     Within the past 12 months, did you worry that your food would run out before you got money to buy more?: No     Within the past 12 months, did the food you bought just not last and you didn t have money to get more?: Yes   Housing Stability: High Risk (11/26/2024)    Housing Stability     Do you have housing? : Yes     Are you worried about losing your housing?: Yes   Physical Activity: Unknown (11/26/2024)    Exercise Vital Sign     Days of Exercise per Week: 7 days   Stress: Stress Concern Present (11/26/2024)    [facility] of " Occupational Health - Occupational Stress Questionnaire     Feeling of Stress : Rather much   Social Connections: Unknown (11/26/2024)    Social Connection and Isolation Panel [NHANES]     Frequency of Social Gatherings with Friends and Family: Never          Disposition Plan     Medically Ready for Discharge: Anticipated in 2-4 Days           The patient's care was discussed with the Attending Physician, Dr. Le.    Park Ellison MD  Hospitalist Service  [facility]  Securely message with [employer] (more info)  Text page via [employer] Paging/Directory   ______________________________________________________________________    Interval History   NAEO. Patient very overwhelmed w/all the information she received today. Chest pain resolved, abdominal pain ongoing. No n/v.     Physical Exam   Vital Signs: Temp: 97.8  F (36.6  C) Temp src: Oral BP: 111/60 Pulse: 69   Resp: 14 SpO2: 100 % O2 Device: None (Room air) Oxygen Delivery: 2 LPM  Weight: 0 lbs 0 oz    GENERAL: Active, alert, in no acute distress.  LUNGS: Normal WOB, no respiratory distress  HEART: Appears well-perfused.  MSK: ROM of all 4 extremities grossly intact, no BLE edema on gross examination   SKIN: No obvious lesions on gross examination  NEUROLOGIC: Normal tone throughout. Normal reflexes for age  PSYCH: Flat affect, tearful       Medical Decision Making       ------------------ MEDICAL DECISION MAKING ------------------------------------------------------------------------------------------------------      Data   ------------------------- PAST 24 HR DATA REVIEWED -----------------------------------------------

## 2025-07-09 NOTE — PLAN OF CARE
Problem: Adult Inpatient Plan of Care  Goal: Optimal Comfort and Wellbeing  Outcome: Progressing   Goal Outcome Evaluation:    Pt arrived on unit around 12:30  pm.  Voided in the bathroom but no stool yet.  Pt denies pain.  Pt was informed that we'll need a stool sample.  Pt went down for bilateral lower ext ultrasound.  Pt's  and daughter are in the room.

## 2025-07-09 NOTE — ANESTHESIA CARE TRANSFER NOTE
Patient: Jamee Muhammad    Procedure: Procedure(s):  DIAGNOSTIC ESOPHAGOGASTRODUODENOSCOPY       Diagnosis: Melena [K92.1]  Diagnosis Additional Information: No value filed.    Anesthesia Type:   MAC     Note:    Oropharynx: oropharynx clear of all foreign objects and spontaneously breathing  Level of Consciousness: awake and drowsy  Oxygen Supplementation: room air    Independent Airway: airway patency satisfactory and stable  Dentition: dentition unchanged  Vital Signs Stable: post-procedure vital signs reviewed and stable  Report to RN Given: handoff report given  Patient transferred to: Phase II    Handoff Report: Identifed the Patient, Identified the Reponsible Provider, Reviewed the pertinent medical history, Discussed the surgical course, Reviewed Intra-OP anesthesia mangement and issues during anesthesia, Set expectations for post-procedure period and Allowed opportunity for questions and acknowledgement of understanding  Vitals:  Vitals Value Taken Time   BP     Temp     Pulse 64 07/09/25 10:05   Resp     SpO2 98 % 07/09/25 10:05   Vitals shown include unfiled device data.    Electronically Signed By: CHRISTOPHER Gomez CRNA  July 9, 2025  10:07 AM

## 2025-07-09 NOTE — ANESTHESIA PREPROCEDURE EVALUATION
Anesthesia Pre-Procedure Evaluation    Patient: Jamee Muhammad   MRN: [id number] : 1955          Procedure : Procedure(s):  ESOPHAGOGASTRODUODENOSCOPY         Past Medical History:   Diagnosis Date    STEMI (ST elevation myocardial infarction) (H)       Past Surgical History:   Procedure Laterality Date     SECTION  32yrs ago    CV CORONARY ANGIOGRAM N/A 9/15/2023    Procedure: Coronary Angiogram;  Surgeon: Augie Farnsworth MD;  Location: [facility] CV    CV LEFT HEART CATH N/A 9/15/2023    Procedure: Left Heart Catheterization;  Surgeon: Augie Farnsworth MD;  Location: [facility]    EYELID LACERATION REPAIR      LAPAROSCOPIC APPENDECTOMY      LAPAROSCOPIC CHOLECYSTECTOMY  2014      Allergies   Allergen Reactions    Celery Oil Other (See Comments)     Swelling, difficulty breathing    Doxycycline Hives    Gadavist [Gadobutrol] Other (See Comments) and Headache     Patient had sensitivity to the Gadavist on 2015.  She claimed she could not breath or swallow well.  After a few minutes she was fine.  Dr. Valente took her vitals and accessed she was fine and probably did not have a reaction.  Dr. Valente recommends the patient have MRI studies done in a hospital setting from now on.  Dr. Mae recommended on 2015 that the patient be pre-medicated if she needs MRI contrast in the future. - CAB    Other Allergy (See Comments) [External Allergen Needs Reconciliation - See Comment] Unknown     Other, 2013.  Action: [place].      Avocado Extract Allergy Skin Test Rash    Cashew Nut Oil Rash      Social History     Tobacco Use    Smoking status: Never     Passive exposure: Never    Smokeless tobacco: Never   Substance Use Topics    Alcohol use: No      Wt Readings from Last 1 Encounters:   25 79.8 kg (176 lb)        Anesthesia Evaluation        History of anesthetic complications       ROS/MED HX  ENT/Pulmonary:     (+)                      asthma                  Neurologic:  "      Cardiovascular: Comment: Diagnosed with PE on admission, no RH strain on CT, heparin gtt    (+)  hypertension- -  CAD (2023) -  - -                                      METS/Exercise Tolerance:     Hematologic:       Musculoskeletal:       GI/Hepatic: Comment: Anemia likely ugi bleed      Renal/Genitourinary:     (+) renal disease, type: CRI,            Endo:     (+)          thyroid problem,     Obesity,       Psychiatric/Substance Use:       Infectious Disease:       Malignancy:       Other:              Physical Exam  Airway  Mallampati: II  TM distance: >3 FB  Neck ROM: full  Mouth opening: >= 4 cm    Cardiovascular - normal exam Comments: Diagnosed with PE on admission, no RH strain on CT, heparin gtt  Dental   (+) Minor Abnormalities - some fillings, tiny chips      Pulmonary - normal exam      Neurological - normal exam  She appears awake, alert and oriented x3.    Other Findings       OUTSIDE LABS:  CBC:   Lab Results   Component Value Date    WBC 8.9 07/08/2025    WBC 8.2 03/11/2025    HGB 7.4 (L) 07/08/2025    HGB 11.8 03/11/2025    HCT 25.5 (L) 07/08/2025    HCT 37.6 03/11/2025     07/08/2025     03/11/2025     BMP:   Lab Results   Component Value Date     07/08/2025     06/24/2025    POTASSIUM 4.1 07/08/2025    POTASSIUM 5.4 (H) 06/24/2025    CHLORIDE 112 (H) 07/08/2025    CHLORIDE 110 (H) 06/24/2025    CO2 21 (L) 07/08/2025    CO2 26 06/24/2025    BUN 42.6 (H) 07/08/2025    BUN 19 06/24/2025    CR 1.35 (H) 07/08/2025    CR 1.10 (H) 06/24/2025     (H) 07/08/2025     (H) 06/24/2025     COAGS:   Lab Results   Component Value Date    PTT 25 07/08/2025    INR 0.95 07/08/2025     POC: No results found for: \"BGM\", \"HCG\", \"HCGS\"  HEPATIC:   Lab Results   Component Value Date    ALBUMIN 3.7 07/08/2025    PROTTOTAL 6.3 (L) 07/08/2025    ALT 25 07/08/2025    AST 17 07/08/2025    ALKPHOS 76 07/08/2025    BILITOTAL 0.4 07/08/2025     OTHER:   Lab Results   Component " "Value Date    A1C 6.0 (H) 08/06/2024    GIACOMO 9.1 07/08/2025    PHOS 3.0 11/26/2024    MAG 1.9 11/26/2024    LIPASE 72 (H) 07/08/2025    TSH 2.04 11/26/2024    T4 1.37 11/06/2023    SED 52 (H) 01/28/2025       Anesthesia Plan    ASA Status:  3, emergent      NPO Status: NPO Appropriate   Anesthesia Type: MAC.  Airway: oral.  Induction: intravenous.   Techniques and Equipment:       - Monitoring Plan: standard ASA monitoring     Consents    Anesthesia Plan(s) and associated risks, benefits, and realistic alternatives discussed. Questions answered and patient/representative(s) expressed understanding.     - Discussed:     - Discussed with:  Patient          Blood Consent:      - Discussed with: patient.     Postoperative Care    Pain management: multimodal analgesia.     Comments:                   Jorge A Escobar MD    I have reviewed the pertinent notes and labs in the chart from the past 30 days and (re)examined the patient.  Any updates or changes from those notes are reflected in this note.    Clinically Significant Risk Factors Present on Admission          # Hyperchloremia: Highest Cl = 112 mmol/L in last 2 days, will monitor as appropriate            # Drug Induced Platelet Defect: home medication list includes an antiplatelet medication   # Hypertension: Noted on problem list      # Anemia: based on hgb <11  # Anemia: based on hgb <11       # Obesity: Estimated body mass index is 38.09 kg/m  as calculated from the following:    Height as of an earlier encounter on 7/8/25: 1.448 m (4' 9\").    Weight as of an earlier encounter on 7/8/25: 79.8 kg (176 lb).       # Asthma: noted on problem list              "

## 2025-07-09 NOTE — SIGNIFICANT EVENT
Notified of Hgb 6.9, downtrending.  Patient was consented for blood transfusion.  1 Unit PRBCs ordered.

## 2025-07-09 NOTE — INTERVAL H&P NOTE
"I have reviewed the surgical (or preoperative) H&P that is linked to this encounter, and examined the patient. Noted changes include: Pt now on heparin for new PE (held around an hour ago)    Clinical Conditions Present on Arrival:  Clinically Significant Risk Factors Present on Admission        # Hyperkalemia: Highest K = 5.4 mmol/L in last 30 days, will monitor as appropriate  # Hypernatremia: Highest Na = 147 mmol/L in last 2 days, will monitor as appropriate  # Hyperchloremia: Highest Cl = 115 mmol/L in last 2 days, will monitor as appropriate             # Drug Induced Platelet Defect: home medication list includes an antiplatelet medication      # Obesity: Estimated body mass index is 38.09 kg/m  as calculated from the following:    Height as of an earlier encounter on 7/8/25: 1.448 m (4' 9\").    Weight as of an earlier encounter on 7/8/25: 79.8 kg (176 lb).       "

## 2025-07-09 NOTE — PLAN OF CARE
Problem: Adult Inpatient Plan of Care  Goal: Plan of Care Review  Description: The Plan of Care Review/Shift note should be completed every shift.  The Outcome Evaluation is a brief statement about your assessment that the patient is improving, declining, or no change.  This information will be displayed automatically on your shift  note.  Outcome: Progressing   Goal Outcome Evaluation:    Patient is alert and oriented x4, vitally stable. On 2L of O2 at night. NPO. Ambulate with SBA. Able to make needs known.     Heparin gtt infusing at 1150 units/hr. Anti Xa timed for 0712.

## 2025-07-10 ENCOUNTER — APPOINTMENT (OUTPATIENT)
Dept: INTERVENTIONAL RADIOLOGY/VASCULAR | Facility: HOSPITAL | Age: 70
End: 2025-07-10
Attending: NURSE PRACTITIONER
Payer: COMMERCIAL

## 2025-07-10 VITALS
HEART RATE: 76 BPM | TEMPERATURE: 98.4 F | DIASTOLIC BLOOD PRESSURE: 73 MMHG | OXYGEN SATURATION: 100 % | RESPIRATION RATE: 18 BRPM | SYSTOLIC BLOOD PRESSURE: 133 MMHG

## 2025-07-10 LAB
ANION GAP SERPL CALCULATED.3IONS-SCNC: 10 MMOL/L (ref 7–15)
BUN SERPL-MCNC: 17.6 MG/DL (ref 8–23)
CALCIUM SERPL-MCNC: 8.9 MG/DL (ref 8.8–10.4)
CHLORIDE SERPL-SCNC: 110 MMOL/L (ref 98–107)
CREAT SERPL-MCNC: 1.31 MG/DL (ref 0.51–0.95)
EGFRCR SERPLBLD CKD-EPI 2021: 44 ML/MIN/1.73M2
ERYTHROCYTE [DISTWIDTH] IN BLOOD BY AUTOMATED COUNT: 15.4 % (ref 10–15)
GLUCOSE SERPL-MCNC: 93 MG/DL (ref 70–99)
HCO3 SERPL-SCNC: 23 MMOL/L (ref 22–29)
HCT VFR BLD AUTO: 26.9 % (ref 35–47)
HGB BLD-MCNC: 8.2 G/DL (ref 11.7–15.7)
MCH RBC QN AUTO: 29 PG (ref 26.5–33)
MCHC RBC AUTO-ENTMCNC: 30.5 G/DL (ref 31.5–36.5)
MCV RBC AUTO: 95 FL (ref 78–100)
PLATELET # BLD AUTO: 280 10E3/UL (ref 150–450)
POTASSIUM SERPL-SCNC: 3.9 MMOL/L (ref 3.4–5.3)
RBC # BLD AUTO: 2.83 10E6/UL (ref 3.8–5.2)
SODIUM SERPL-SCNC: 143 MMOL/L (ref 135–145)
WBC # BLD AUTO: 5.8 10E3/UL (ref 4–11)

## 2025-07-10 PROCEDURE — 255N000002 HC RX 255 OP 636: Performed by: RADIOLOGY

## 2025-07-10 PROCEDURE — 120N000001 HC R&B MED SURG/OB

## 2025-07-10 PROCEDURE — 99233 SBSQ HOSP IP/OBS HIGH 50: CPT | Mod: GC

## 2025-07-10 PROCEDURE — 85014 HEMATOCRIT: CPT

## 2025-07-10 PROCEDURE — 36415 COLL VENOUS BLD VENIPUNCTURE: CPT

## 2025-07-10 PROCEDURE — 99152 MOD SED SAME PHYS/QHP 5/>YRS: CPT

## 2025-07-10 PROCEDURE — 250N000013 HC RX MED GY IP 250 OP 250 PS 637: Performed by: FAMILY MEDICINE

## 2025-07-10 PROCEDURE — B5191ZZ FLUOROSCOPY OF INFERIOR VENA CAVA USING LOW OSMOLAR CONTRAST: ICD-10-PCS | Performed by: RADIOLOGY

## 2025-07-10 PROCEDURE — C1769 GUIDE WIRE: HCPCS

## 2025-07-10 PROCEDURE — 250N000011 HC RX IP 250 OP 636: Performed by: NURSE PRACTITIONER

## 2025-07-10 PROCEDURE — 258N000003 HC RX IP 258 OP 636

## 2025-07-10 PROCEDURE — 250N000011 HC RX IP 250 OP 636: Performed by: FAMILY MEDICINE

## 2025-07-10 PROCEDURE — 80048 BASIC METABOLIC PNL TOTAL CA: CPT

## 2025-07-10 PROCEDURE — 06H03DZ INSERTION OF INTRALUMINAL DEVICE INTO INFERIOR VENA CAVA, PERCUTANEOUS APPROACH: ICD-10-PCS | Performed by: RADIOLOGY

## 2025-07-10 PROCEDURE — 272N000500 HC NEEDLE CR2

## 2025-07-10 PROCEDURE — C1880 VENA CAVA FILTER: HCPCS

## 2025-07-10 PROCEDURE — 250N000013 HC RX MED GY IP 250 OP 250 PS 637

## 2025-07-10 RX ORDER — FLUMAZENIL 0.1 MG/ML
0.2 INJECTION, SOLUTION INTRAVENOUS
Status: DISCONTINUED | OUTPATIENT
Start: 2025-07-10 | End: 2025-07-10 | Stop reason: HOSPADM

## 2025-07-10 RX ORDER — NALOXONE HYDROCHLORIDE 0.4 MG/ML
0.4 INJECTION, SOLUTION INTRAMUSCULAR; INTRAVENOUS; SUBCUTANEOUS
Status: DISCONTINUED | OUTPATIENT
Start: 2025-07-10 | End: 2025-07-10 | Stop reason: HOSPADM

## 2025-07-10 RX ORDER — IODIXANOL 320 MG/ML
100 INJECTION, SOLUTION INTRAVASCULAR ONCE
Status: COMPLETED | OUTPATIENT
Start: 2025-07-10 | End: 2025-07-10

## 2025-07-10 RX ORDER — LABETALOL HYDROCHLORIDE 5 MG/ML
10-20 INJECTION, SOLUTION INTRAVENOUS EVERY 10 MIN PRN
Status: DISCONTINUED | OUTPATIENT
Start: 2025-07-10 | End: 2025-07-12 | Stop reason: HOSPADM

## 2025-07-10 RX ORDER — HYDRALAZINE HYDROCHLORIDE 20 MG/ML
10-20 INJECTION INTRAMUSCULAR; INTRAVENOUS EVERY 30 MIN PRN
Status: DISCONTINUED | OUTPATIENT
Start: 2025-07-10 | End: 2025-07-12 | Stop reason: HOSPADM

## 2025-07-10 RX ORDER — NALOXONE HYDROCHLORIDE 0.4 MG/ML
0.2 INJECTION, SOLUTION INTRAMUSCULAR; INTRAVENOUS; SUBCUTANEOUS
Status: DISCONTINUED | OUTPATIENT
Start: 2025-07-10 | End: 2025-07-10 | Stop reason: HOSPADM

## 2025-07-10 RX ORDER — ONDANSETRON 2 MG/ML
4 INJECTION INTRAMUSCULAR; INTRAVENOUS
Status: DISCONTINUED | OUTPATIENT
Start: 2025-07-10 | End: 2025-07-10 | Stop reason: HOSPADM

## 2025-07-10 RX ORDER — FENTANYL CITRATE 50 UG/ML
25-50 INJECTION, SOLUTION INTRAMUSCULAR; INTRAVENOUS EVERY 5 MIN PRN
Status: DISCONTINUED | OUTPATIENT
Start: 2025-07-10 | End: 2025-07-10 | Stop reason: HOSPADM

## 2025-07-10 RX ADMIN — SODIUM CHLORIDE, SODIUM LACTATE, POTASSIUM CHLORIDE, AND CALCIUM CHLORIDE: .6; .31; .03; .02 INJECTION, SOLUTION INTRAVENOUS at 04:54

## 2025-07-10 RX ADMIN — ACETAMINOPHEN 650 MG: 325 TABLET ORAL at 08:59

## 2025-07-10 RX ADMIN — Medication 50 MCG: at 20:28

## 2025-07-10 RX ADMIN — ACETAMINOPHEN 650 MG: 325 TABLET ORAL at 20:28

## 2025-07-10 RX ADMIN — PANTOPRAZOLE SODIUM 40 MG: 40 INJECTION, POWDER, FOR SOLUTION INTRAVENOUS at 08:58

## 2025-07-10 RX ADMIN — IODIXANOL 60 ML: 320 INJECTION, SOLUTION INTRAVASCULAR at 16:38

## 2025-07-10 RX ADMIN — PANTOPRAZOLE SODIUM 40 MG: 40 INJECTION, POWDER, FOR SOLUTION INTRAVENOUS at 20:28

## 2025-07-10 RX ADMIN — SODIUM CHLORIDE, SODIUM LACTATE, POTASSIUM CHLORIDE, AND CALCIUM CHLORIDE: .6; .31; .03; .02 INJECTION, SOLUTION INTRAVENOUS at 15:00

## 2025-07-10 RX ADMIN — FENTANYL CITRATE 25 MCG: 50 INJECTION, SOLUTION INTRAMUSCULAR; INTRAVENOUS at 16:28

## 2025-07-10 RX ADMIN — MIDAZOLAM HYDROCHLORIDE 2 MG: 1 INJECTION, SOLUTION INTRAMUSCULAR; INTRAVENOUS at 16:18

## 2025-07-10 RX ADMIN — BUPROPION HYDROCHLORIDE 150 MG: 150 TABLET, FILM COATED, EXTENDED RELEASE ORAL at 08:59

## 2025-07-10 RX ADMIN — ATORVASTATIN CALCIUM 40 MG: 40 TABLET, FILM COATED ORAL at 20:28

## 2025-07-10 RX ADMIN — LEVOTHYROXINE SODIUM 50 MCG: 0.03 TABLET ORAL at 08:59

## 2025-07-10 RX ADMIN — ALLOPURINOL 100 MG: 100 TABLET ORAL at 09:00

## 2025-07-10 ASSESSMENT — ACTIVITIES OF DAILY LIVING (ADL)
ADLS_ACUITY_SCORE: 35
ADLS_ACUITY_SCORE: 39
ADLS_ACUITY_SCORE: 39
ADLS_ACUITY_SCORE: 35
ADLS_ACUITY_SCORE: 35
ADLS_ACUITY_SCORE: 31
ADLS_ACUITY_SCORE: 31
ADLS_ACUITY_SCORE: 35
ADLS_ACUITY_SCORE: 31
ADLS_ACUITY_SCORE: 35
ADLS_ACUITY_SCORE: 31
ADLS_ACUITY_SCORE: 39
ADLS_ACUITY_SCORE: 35
ADLS_ACUITY_SCORE: 39
ADLS_ACUITY_SCORE: 35

## 2025-07-10 NOTE — PLAN OF CARE
Problem: Adult Inpatient Plan of Care  Goal: Plan of Care Review  Description: The Plan of Care Review/Shift note should be completed every shift.  The Outcome Evaluation is a brief statement about your assessment that the patient is improving, declining, or no change.  This information will be displayed automatically on your shift  note.  Outcome: Progressing   Goal Outcome Evaluation:       Pt is A/O x4. Denies pain and discomfort. NPO at midnight. VSS, continue to monitor. Sabrina Foster RN

## 2025-07-10 NOTE — PROGRESS NOTES
Pt complained of dizziness, checked sitting and standing, 127/75, 80 sitting and standing 155/80, 90. Pt says the room is spinning, the GI doctor came this morning and NP who is getting ready for filter they are both aware. Pt also have headache and I will give Tylenol.

## 2025-07-10 NOTE — PROCEDURES
[facility]    Procedure: IR Procedure Note    Date/Time: 7/10/2025 4:48 PM    Performed by: Cheryl Rao MD  Authorized by: Cheryl Rao MD  IR Fellow Physician:    Pre Procedure Diagnosis: VTE  Post Procedure Diagnosis: VTE    UNIVERSAL PROTOCOL   Site Marked: NA  Prior Images Obtained and Reviewed:  Yes  Required items: Required blood products, implants, devices and special equipment available    Patient identity confirmed:  Arm band, provided demographic data, hospital-assigned identification number and verbally with patient  Patient was reevaluated immediately before administering moderate or deep sedation or anesthesia  Confirmation Checklist:  Correct equipment/implants were available, procedure was appropriate and matched the consent or emergent situation, relevant allergies and patient's identity using two indicators  Time out: Immediately prior to the procedure a time out was called    Universal Protocol: the Joint Commission Universal Protocol was followed    Preparation: Patient was prepped and draped in usual sterile fashion       ANESTHESIA    Anesthesia:  Local infiltration  Local Anesthetic:  Lidocaine 1% without epinephrine      SEDATION    Patient Sedated: No    See dictated procedure note for full details.  Findings: Successful retrievable IVC filter placement    Specimens: none    Procedural Complications: None    Condition: Stable      PROCEDURE    Patient Tolerance:  Patient tolerated the procedure well with no immediate complications  Length of time physician/provider present for 1:1 monitoring during sedation:  0-22 min

## 2025-07-10 NOTE — PROGRESS NOTES
[facility] DIGESTIVE HEALTH PROGRESS NOTE      Subjective:              No acute events overnight, LLE long segment DVT noted on doppler. Awaiting IVC filter placement. No Bms.    Objective:                  Vital signs in last 24 hrs;  Temp:  [97  F (36.1  C)-99  F (37.2  C)] 98.6  F (37  C)  Pulse:  [63-83] 83  Resp:  [16-18] 16  BP: (104-145)/(56-78) 145/76  SpO2:  [91 %-100 %] 95 %    Physical Exam:   GENERAL: Appears comfortable, in no acute distress.  EYES: Normal  GASTROINTESTINAL: Soft, non-tender, non-distended  NEUROLOGIC: Alert and oriented  PSYCHIATRIC: Calm, cooperative      Current Labs:  CBC RESULTS:   Recent Labs   Lab Test 07/10/25  0627   WBC 5.8   RBC 2.83*   HGB 8.2*   HCT 26.9*   MCV 95   MCH 29.0   MCHC 30.5*   RDW 15.4*           CMP Results:   Recent Labs   Lab Test 07/09/25  1911 07/09/25  0708 07/08/25  1226   NA  --  147* 144   POTASSIUM  --  3.9 4.1   CHLORIDE  --  115* 112*   CO2  --  21* 21*   ANIONGAP  --  11 11   GLC 90 95 102*   BUN  --  31.5* 42.6*   CR  --  1.41* 1.35*   BILITOTAL  --   --  0.4   ALKPHOS  --   --  76   ALT  --   --  25   AST  --   --  17        INR Results:   Recent Labs   Lab Test 07/08/25  1255   INR 0.95          Relevant Imaging:  US LOWER EXTREMITY VENOUS DUPLEX BILATERAL  LOCATION: [facility]  DATE: 7/9/2025     INDICATION: R sided PE, r o DVT. Determing if patient needs IVC filter.  COMPARISON: None.  TECHNIQUE: Venous Duplex ultrasound of bilateral lower extremities with and without compression, augmentation and duplex. Color flow and spectral Doppler with waveform analysis performed.     FINDINGS: Exam includes the common femoral, femoral, popliteal veins as well as segmentally visualized deep calf veins and greater saphenous vein.      RIGHT: No deep vein thrombosis. No superficial thrombophlebitis. No popliteal cyst.     LEFT: Nonocclusive DVT from the common femoral vein to the popliteal vein. This subsequently becomes  occlusive distal to this in the peroneal and posterior tibial veins. No superficial thrombophlebitis. No popliteal cyst.                                                                      IMPRESSION:  1.  Long segment DVT of the left lower extremity from the common femoral vein to the calf veins.  2.  No right lower extremity DVT.    Assessment:       Melena secondary to multiple large gastric ulcers noted on EGD 7/9 - high suspicion for H. Pylori infection  Acute blood loss anemia  DVT/PE    Plan:     - Agree with IVC filter placement  - Follow Hgb and  transfuse as necesssary  - Hold anticoagulation for at least another 48hrs  - Stool H. Pylori Ag. Once sample has been collected, would recommend starting empiric quadruple therapy for treatment  - Continue Protonix 40mg twice daily in the interim  - Avoid NSAIDs  - No dietary restrictions from a GI standpoint.    Total time: 28 minutes            IDALMIS Mcnulty  Thank you for the opportunity to participate in the care of this patient.   Please feel free to call me with any questions or concerns.  Phone number [phone].

## 2025-07-10 NOTE — PLAN OF CARE
Problem: Adult Inpatient Plan of Care  Goal: Absence of Hospital-Acquired Illness or Injury  Outcome: Progressing  Intervention: Identify and Manage Fall Risk  Recent Flowsheet Documentation  Taken 7/9/2025 1800 by Arin Stanley RN  Safety Promotion/Fall Prevention:   lighting adjusted   patient video monitoring   room near nurse's station  Intervention: Prevent Skin Injury  Recent Flowsheet Documentation  Taken 7/9/2025 1800 by Arin Stanley RN  Body Position: position changed independently   Goal Outcome Evaluation:    Blood pressure (!) 140/64, pulse 71, temperature 98.4  F, resp. rate 16, SpO2 97% on RA    Pt a/o, denied pain, SBA to the bathroom.     Tolerating LR at 100 ml/hr    Pt maintained clear liquid diet, plan for NPO at midnight for IR procedure tomorrow. Patient and family aware of plan.     Family involved in care, spouse at bedside.

## 2025-07-10 NOTE — PROGRESS NOTES
IR called REPORT GIVEN, transporter will come and pick patient. Pt says no headache, no dizziness even with walking.

## 2025-07-10 NOTE — PROVIDER NOTIFICATION
Patient Name: Jamee Muhammad  Medical Record Number: [id number]  Today's Date: 7/10/2025    Procedure: IVC filter  Proceduralist: Dr Souza    Sedation medications administered: 2 mg midazolam and 25 mcg fentanyl   Sedation time: 15 minutes    Pt transported back to room 206 awake and alert.  Report given to receiving RN without questions or concerns.

## 2025-07-10 NOTE — SIGNIFICANT EVENT
Significant Event Note    Time of event: 7:58 PM July 9, 2025    Description of event:  Patient has anti-IgE and IgM antibodies and has required multiple blood transfusions.     Plan:  Per blood bank requests, okay to check Hgb every two transfusions.     Discussed with: bedside nurse    Tessa Verduzco DO, BRANDYN  Family Medicine PGY-1

## 2025-07-10 NOTE — PROGRESS NOTES
Interventional Radiology Re-Assessment  07/10/2025     Procedure Requested: IVC Filter Placement  Requested by: Park Ellison MD      Please see full note from CHERRY Arnett CNP on 7/9/25 for patient's IR pre-procedure note.    NPO status reviewed      Clinical notes reviewed    Pertinent medications reviewed      Vitals:  /57 (BP Location: Left arm)   Pulse 81   Temp 98.7  F (37.1  C) (Oral)   Resp 18   LMP  (LMP Unknown)   SpO2 (!) 91%      LABS:  CBC RESULTS:   Recent Labs   Lab Test 07/10/25  0627   WBC 5.8   RBC 2.83*   HGB 8.2*   HCT 26.9*   MCV 95   MCH 29.0   MCHC 30.5*   RDW 15.4*         INR   Date Value Ref Range Status   07/08/2025 0.95 0.85 - 1.15 Final      Creatinine   Date Value Ref Range Status   07/09/2025 1.41 (H) 0.51 - 0.95 mg/dL Final     Potassium   Date Value Ref Range Status   07/09/2025 3.9 3.4 - 5.3 mmol/L Final   06/24/2025 5.4 (H) 3.4 - 5.3 mmol/L Final        PLAN:  #R-sided PE   #large gastric ulcer w/acute blood loss anemia  #Contraindication to anticoagulation  #LLE DVT - patient denies pain     Inferior Vena Cava Filter Placement with sedation.    Procedural education reviewed with patient/family in detail including, but not limited to risks, benefits and alternatives with understanding verbalized by patient/family.    IVC filter devices can be left in permanently in some instances, but typically recommend remvoal within 3 months of placement if it is no longer adding benefit to patient. Recommend follow up with primary care provider ONE MONTH from filter placement date to determine if filter should be removed.    CHRISTOPHER Aggarwal, CNP  Interventional Radiology   [phone]

## 2025-07-10 NOTE — PROGRESS NOTES
[facility]    Medicine Progress Note - Hospitalist Service    Date of Admission:  7/8/2025    Assessment & Plan      Jamee Muhammad is a 70 year old female w/PMHx significant for CAD, CKD3a, HTN, and hypothyroidism admitted on 7/8/2025 for chest pain, fth R-sided PE and LLE DVT. Also has acute blood loss anemia 2/2 large gastric ulcers seen on EGD 7/9 - at high risk for hemorrhaging. Awaiting IR for IVC filter placement.    Chest pain, likely 2/2 R-sided PE  LLE DVT   Presents w/3 weeks of intermittent episodes of intense fatigue and sharp chest pain that significantly worsened overnight. D-dimer significantly elevated, so CT PE was pursued showing R-sided PE w/o evidence of RH strain. DVT US 7/9 showed long segment DVT of LLE common femoral to calf veins. Unclear trigger - no recent travel, no known malignancy. Daughter does report she has been more immobile d/t the chest pain. ACS workup negative - EKG NSR, trops downtrending, BNP wnl. Lipase wnl. Unfortunately, anticoagulation c/b UGIB and acute blood loss anemia. Awaiting IVC filter placement, hopeful for today.   -Hold heparin load + gtt until 7/12   -IR consult for IVC filter, recs appreciated   -NPO    Acute blood loss anemia 2/2 gastric ulcers  Hgb downtrended to as low as 6.9, requiring 1u pRBC with improvement to 8.2, baseline ~11. Epigastric pain now tolerable. EGD 7/7 showed gastric ulcers as large as 30mm - no active bleeding, but scattered hematin suggestive of recent bleed. Bx not taken d/t anticoagulation, though high suspicions for H pylori. Per discussion w/GI they are concerned the bleeding will worsen and ulceration will be irreparable if anticoagulation is continued.  -LR mIVF @ 100mL/hr  -CBC daily, transfuse for hgb < 7  -GI consult, recs appreciated   -Protonix 40mg IV BID   -Hold anticoagulation until 7/12   -H pylori stool testing - start treatment empirically once sample obtained    -Repeat EGD in 2-3mo to confirm healing  "   -Avoid NSAIDs     MESFIN on CKD3a  HTN  Cr uptrended to 1.41, baseline ~1.2. Gradually improving. Likely prerenal in setting of acute blood loss anemia.   -mIVF per above   -Hold PTA lasix and lisinopril  -Trend BMP    Chronic conditions:  CAD -  hold PTA ASA, continue atorvastatin   Hypothyroidism - continue PTA levothryoxine 50mcg d   MDD - PTA Wellbutrin PRN          Diet: NPO for Procedure/Surgery per Anesthesia Guidelines Except for: Meds; Clear liquids before procedure/surgery: ADULT (Age GREATER than or Equal to 18 years) - Clear liquids 2 hours before procedure/surgery    DVT Prophylaxis: heparin gtt   Crowder Catheter: Not present  Lines: None     Cardiac Monitoring: None  Code Status: Full Code      Clinically Significant Risk Factors         # Hypernatremia: Highest Na = 147 mmol/L in last 2 days, will monitor as appropriate  # Hyperchloremia: Highest Cl = 115 mmol/L in last 2 days, will monitor as appropriate              # Hypertension: Noted on problem list              # Obesity: Estimated body mass index is 38.09 kg/m  as calculated from the following:    Height as of an earlier encounter on 7/8/25: 1.448 m (4' 9\").    Weight as of an earlier encounter on 7/8/25: 79.8 kg (176 lb)., PRESENT ON ADMISSION     # Asthma: noted on problem list        Social Drivers of Health    Physical Activity: Unknown (11/26/2024)    Exercise Vital Sign     Days of Exercise per Week: 7 days   Stress: Stress Concern Present (11/26/2024)    [place] [employer] - Occupational Stress Questionnaire     Feeling of Stress : Rather much   Social Connections: Unknown (11/26/2024)    Social Connection and Isolation Panel [NHANES]     Frequency of Social Gatherings with Friends and Family: Never          Disposition Plan     Medically Ready for Discharge: Anticipated in 2-4 Days           The patient's care was discussed with the Attending Physician, Dr. Le.    Park Ellison MD  Hospitalist Service  M " [facility]  Securely message with [employer] (more info)  Text page via [employer] Paging/Directory   ______________________________________________________________________    Interval History   NAEO. Patient fatigued today. Denies abdominal pain, chest pain, dyspnea. Unable to make BM overnight as she has not eaten much since admission. No issues w/urination.     Physical Exam   Vital Signs: Temp: 98.6  F (37  C) Temp src: Oral BP: (!) 155/80 (STANDING UP) Pulse: 90   Resp: 16 SpO2: 95 % O2 Device: None (Room air)    Weight: 0 lbs 0 oz    GENERAL: Active, alert, in no acute distress.  LUNGS: Normal WOB, no respiratory distress, lungs clear to auscultation  HEART: Appears well-perfused. RRR, murmurs/rubs/clicks not appreciated   MSK: ROM of all 4 extremities grossly intact, trace BLE pitting edema   ABD: Nontender to palpation, nondistended, soft. Hypoactive BS.   SKIN: No obvious lesions on gross examination  NEUROLOGIC: Normal tone throughout. Normal reflexes for age  PSYCH: Flat affect, depressed       Medical Decision Making       ------------------ MEDICAL DECISION MAKING ------------------------------------------------------------------------------------------------------      Data   ------------------------- PAST 24 HR DATA REVIEWED -----------------------------------------------

## 2025-07-10 NOTE — PRE-PROCEDURE
GENERAL PRE-PROCEDURE:   Procedure:  IVC filter placement  Date/Time:  7/10/2025 8:45 AM    Written consent obtained?: Yes    Risks and benefits: Risks, benefits and alternatives were discussed    Consent given by:  Patient  Patient states understanding of procedure being performed: Yes    Patient's understanding of procedure matches consent: Yes    Procedure consent matches procedure scheduled: Yes    Expected level of sedation:  Moderate  Appropriately NPO:  Yes  ASA Class:  2  Mallampati  :  Grade 2- soft palate, base of uvula, tonsillar pillars, and portion of posterior pharyngeal wall visible  Lungs:  Lungs clear with good breath sounds bilaterally  Heart:  Normal heart sounds and rate  History & Physical reviewed:  History and physical reviewed and no updates needed  Statement of review:  I have reviewed the lab findings, diagnostic data, medications, and the plan for sedation

## 2025-07-11 ENCOUNTER — APPOINTMENT (OUTPATIENT)
Dept: CARDIOLOGY | Facility: HOSPITAL | Age: 70
End: 2025-07-11
Payer: COMMERCIAL

## 2025-07-11 LAB
ANION GAP SERPL CALCULATED.3IONS-SCNC: 9 MMOL/L (ref 7–15)
ATRIAL RATE - MUSE: 72 BPM
BUN SERPL-MCNC: 11.7 MG/DL (ref 8–23)
CALCIUM SERPL-MCNC: 9.2 MG/DL (ref 8.8–10.4)
CHLORIDE SERPL-SCNC: 110 MMOL/L (ref 98–107)
CREAT SERPL-MCNC: 1.16 MG/DL (ref 0.51–0.95)
DIASTOLIC BLOOD PRESSURE - MUSE: 80 MMHG
EGFRCR SERPLBLD CKD-EPI 2021: 51 ML/MIN/1.73M2
ERYTHROCYTE [DISTWIDTH] IN BLOOD BY AUTOMATED COUNT: 14.9 % (ref 10–15)
GLUCOSE SERPL-MCNC: 81 MG/DL (ref 70–99)
HCO3 SERPL-SCNC: 26 MMOL/L (ref 22–29)
HCT VFR BLD AUTO: 28.1 % (ref 35–47)
HGB BLD-MCNC: 8.3 G/DL (ref 11.7–15.7)
INTERPRETATION ECG - MUSE: NORMAL
LVEF ECHO: NORMAL
MCH RBC QN AUTO: 28.2 PG (ref 26.5–33)
MCHC RBC AUTO-ENTMCNC: 29.5 G/DL (ref 31.5–36.5)
MCV RBC AUTO: 96 FL (ref 78–100)
P AXIS - MUSE: 44 DEGREES
PLATELET # BLD AUTO: 244 10E3/UL (ref 150–450)
POTASSIUM SERPL-SCNC: 4.2 MMOL/L (ref 3.4–5.3)
PR INTERVAL - MUSE: 144 MS
QRS DURATION - MUSE: 104 MS
QT - MUSE: 422 MS
QTC - MUSE: 462 MS
R AXIS - MUSE: -21 DEGREES
RBC # BLD AUTO: 2.94 10E6/UL (ref 3.8–5.2)
SODIUM SERPL-SCNC: 145 MMOL/L (ref 135–145)
SYSTOLIC BLOOD PRESSURE - MUSE: 181 MMHG
T AXIS - MUSE: 52 DEGREES
VENTRICULAR RATE- MUSE: 72 BPM
WBC # BLD AUTO: 6.1 10E3/UL (ref 4–11)

## 2025-07-11 PROCEDURE — 87338 HPYLORI STOOL AG IA: CPT | Performed by: INTERNAL MEDICINE

## 2025-07-11 PROCEDURE — 250N000013 HC RX MED GY IP 250 OP 250 PS 637: Performed by: FAMILY MEDICINE

## 2025-07-11 PROCEDURE — 250N000011 HC RX IP 250 OP 636

## 2025-07-11 PROCEDURE — 36415 COLL VENOUS BLD VENIPUNCTURE: CPT

## 2025-07-11 PROCEDURE — 99233 SBSQ HOSP IP/OBS HIGH 50: CPT | Mod: GC

## 2025-07-11 PROCEDURE — 80048 BASIC METABOLIC PNL TOTAL CA: CPT

## 2025-07-11 PROCEDURE — 258N000003 HC RX IP 258 OP 636

## 2025-07-11 PROCEDURE — 999N000208 ECHOCARDIOGRAM COMPLETE

## 2025-07-11 PROCEDURE — 250N000013 HC RX MED GY IP 250 OP 250 PS 637

## 2025-07-11 PROCEDURE — 120N000001 HC R&B MED SURG/OB

## 2025-07-11 PROCEDURE — 250N000013 HC RX MED GY IP 250 OP 250 PS 637: Performed by: PHYSICIAN ASSISTANT

## 2025-07-11 PROCEDURE — 93306 TTE W/DOPPLER COMPLETE: CPT | Mod: 26 | Performed by: GENERAL ACUTE CARE HOSPITAL

## 2025-07-11 PROCEDURE — 255N000002 HC RX 255 OP 636

## 2025-07-11 PROCEDURE — 85027 COMPLETE CBC AUTOMATED: CPT

## 2025-07-11 PROCEDURE — 250N000011 HC RX IP 250 OP 636: Performed by: FAMILY MEDICINE

## 2025-07-11 RX ORDER — METRONIDAZOLE 500 MG/1
500 TABLET ORAL 3 TIMES DAILY
Status: DISCONTINUED | OUTPATIENT
Start: 2025-07-11 | End: 2025-07-12 | Stop reason: HOSPADM

## 2025-07-11 RX ORDER — CLARITHROMYCIN 250 MG/1
250 TABLET, FILM COATED ORAL EVERY 12 HOURS SCHEDULED
Status: DISCONTINUED | OUTPATIENT
Start: 2025-07-11 | End: 2025-07-12 | Stop reason: HOSPADM

## 2025-07-11 RX ORDER — BUPROPION HYDROCHLORIDE 150 MG/1
150 TABLET ORAL DAILY PRN
Status: DISCONTINUED | OUTPATIENT
Start: 2025-07-11 | End: 2025-07-12 | Stop reason: HOSPADM

## 2025-07-11 RX ORDER — AMOXICILLIN 250 MG/1
1000 CAPSULE ORAL 2 TIMES DAILY
Status: DISCONTINUED | OUTPATIENT
Start: 2025-07-11 | End: 2025-07-12 | Stop reason: HOSPADM

## 2025-07-11 RX ADMIN — ALLOPURINOL 100 MG: 100 TABLET ORAL at 08:26

## 2025-07-11 RX ADMIN — BUPROPION HYDROCHLORIDE 150 MG: 150 TABLET, FILM COATED, EXTENDED RELEASE ORAL at 08:26

## 2025-07-11 RX ADMIN — AMOXICILLIN 1000 MG: 250 CAPSULE ORAL at 20:15

## 2025-07-11 RX ADMIN — ATORVASTATIN CALCIUM 40 MG: 40 TABLET, FILM COATED ORAL at 20:16

## 2025-07-11 RX ADMIN — PANTOPRAZOLE SODIUM 40 MG: 40 INJECTION, POWDER, FOR SOLUTION INTRAVENOUS at 20:16

## 2025-07-11 RX ADMIN — ONDANSETRON 4 MG: 2 INJECTION, SOLUTION INTRAMUSCULAR; INTRAVENOUS at 22:24

## 2025-07-11 RX ADMIN — SODIUM CHLORIDE, SODIUM LACTATE, POTASSIUM CHLORIDE, AND CALCIUM CHLORIDE: .6; .31; .03; .02 INJECTION, SOLUTION INTRAVENOUS at 10:27

## 2025-07-11 RX ADMIN — METRONIDAZOLE 500 MG: 500 TABLET ORAL at 20:15

## 2025-07-11 RX ADMIN — SODIUM CHLORIDE, SODIUM LACTATE, POTASSIUM CHLORIDE, AND CALCIUM CHLORIDE: .6; .31; .03; .02 INJECTION, SOLUTION INTRAVENOUS at 00:04

## 2025-07-11 RX ADMIN — LISINOPRIL 20 MG: 20 TABLET ORAL at 20:16

## 2025-07-11 RX ADMIN — PERFLUTREN 3 ML: 6.52 INJECTION, SUSPENSION INTRAVENOUS at 09:53

## 2025-07-11 RX ADMIN — Medication 50 MCG: at 20:15

## 2025-07-11 RX ADMIN — CLARITHROMYCIN 250 MG: 250 TABLET, FILM COATED ORAL at 20:16

## 2025-07-11 RX ADMIN — LEVOTHYROXINE SODIUM 50 MCG: 0.03 TABLET ORAL at 08:27

## 2025-07-11 RX ADMIN — PANTOPRAZOLE SODIUM 40 MG: 40 INJECTION, POWDER, FOR SOLUTION INTRAVENOUS at 08:27

## 2025-07-11 NOTE — PLAN OF CARE
Problem: Adult Inpatient Plan of Care  Goal: Optimal Comfort and Wellbeing  Outcome: Progressing     Problem: Delirium  Goal: Improved Sleep  Outcome: Progressing     Problem: Pain Acute  Goal: Optimal Pain Control and Function  Outcome: Progressing  Intervention: Prevent or Manage Pain  Recent Flowsheet Documentation  Taken 7/11/2025 0005 by Tuan Cadet, RN  Medication Review/Management: medications reviewed  Taken 7/10/2025 2030 by Tuan Cadet, RN  Medication Review/Management: medications reviewed   Goal Outcome Evaluation:    A/Ox4. Very hypertensive in evening with peak BP of 210/93. Resident was paged and PRN labetalol and hydralazine were ordered. However, BP improved without medication and most recent was 144/73. On 1L O2 d/t desaturation while sleeping.     Given tylenol for headache x1 with improvement.     Dressing on right neck C/D/I. Pt reports some numbness at site.     Resting between cares.  in room overnight.           Tuan Cadet, RN

## 2025-07-11 NOTE — PLAN OF CARE
Problem: Adult Inpatient Plan of Care  Goal: Plan of Care Review  Description: The Plan of Care Review/Shift note should be completed every shift.  The Outcome Evaluation is a brief statement about your assessment that the patient is improving, declining, or no change.  This information will be displayed automatically on your shift  note.  Outcome: Progressing  Flowsheets (Taken 7/10/2025 2024)  Plan of Care Reviewed With: patient  Overall Patient Progress: improving     Problem: Fall Injury Risk  Goal: Absence of Fall and Fall-Related Injury  Outcome: Progressing     Problem: Surgery Nonspecified  Goal: Absence of Bleeding  Outcome: Progressing  Goal: Optimal Pain Control and Function  Outcome: Progressing  Goal: Effective Oxygenation and Ventilation  Outcome: Progressing  Intervention: Optimize Oxygenation and Ventilation  Recent Flowsheet Documentation  Taken 7/10/2025 1914 by Carmina Henriquez, RN  Activity Management:   activity adjusted per tolerance   ambulated to bathroom  Head of Bed (HOB) Positioning: HOB at 45 degrees  Taken 7/10/2025 1500 by Carmina Henriquez, RN  Activity Management:   activity adjusted per tolerance   activity encouraged   ambulated to bathroom   Goal Outcome Evaluation:      Plan of Care Reviewed With: patient    Overall Patient Progress: improvingOverall Patient Progress: improving    Pt came from IR dressing on the right neck area dry intact, says just sore and some headache 1/10. Teaching done family in room pt explained to put pressure on the site when coughing or sneezing, not to over use that side all verbal understanding. Pt walked to bath room and voided well clear yellow urine. BP was on the high side Paged DR. Dinorah Sanchez and did not get orders. BP was 210/93. INCOMING RN aware. Sent Message to Attending DR. Ora Malik.

## 2025-07-11 NOTE — PLAN OF CARE
Problem: Adult Inpatient Plan of Care  Goal: Plan of Care Review  Outcome: Progressing  Flowsheets (Taken 7/11/2025 1235)  Plan of Care Reviewed With: patient  Overall Patient Progress: improving         Goal Outcome Evaluation:      Plan of Care Reviewed With: patient    Overall Patient Progress: improvingOverall Patient Progress: improving    A/Ox3. VSS. Afebrile. Communicates needs. Hmong speaking. SBA. Denies pain and discomfort. Denies nausea or headache. No CP or SOB reported, does have dyspnea with activities. IVFs discontinued and diet advanced per patient request. Regular diet, had 50% intake and adequate fluid intake. I/o recorded. Bed alarm on. Family at bedside. Echo completed. Stool sample sent for H. Pylori. No questions or concerns at this time. Ambulated in room and hallway. Will continue to monitor.    Arnulfo Fishman RN

## 2025-07-11 NOTE — PROGRESS NOTES
[facility]    Medicine Progress Note - Hospitalist Service    Date of Admission:  7/8/2025    Assessment & Plan      Jamee Muhammad is a 70 year old female w/PMHx significant for CAD, CKD3a, HTN, and hypothyroidism admitted on 7/8/2025 for chest pain, fth R-sided PE and LLE DVT. Also has acute blood loss anemia 2/2 large gastric ulcers seen on EGD 7/9 - at high risk for hemorrhaging. Heparin held, IVC filter placed 7/10 by IR.     Chest pain, likely 2/2 R-sided PE  LLE DVT   Presents w/3 weeks of intermittent episodes of intense fatigue and sharp chest pain that significantly worsened overnight. D-dimer significantly elevated, so CT PE was pursued showing R-sided PE w/o evidence of RH strain. DVT US 7/9 showed long segment DVT of LLE common femoral to calf veins. Unclear trigger - no recent travel, no known malignancy. Daughter does report she has been more immobile d/t the chest pain. ACS workup negative - EKG NSR, trops downtrending, BNP wnl. Lipase wnl. IVC filter placed d/t inability to anticoagulate in setting of UGIB.   -Discontinue hepari gtt  -IR consult, recs appreciated    -IVC filter placed 7/10  -PT consult, recs appreciated     Acute blood loss anemia 2/2 gastric ulcers  Hgb downtrended to as low as 6.9, requiring 1u pRBC with improvement and stabilization ~8, baseline ~11. Epigastric pain now tolerable. EGD 7/7 showed gastric ulcers as large as 30mm - no active bleeding, but scattered hematin suggestive of recent bleed. Bx not taken d/t anticoagulation, though high suspicions for H pylori. Per discussion w/GI they are concerned the bleeding will worsen and ulceration will be irreparable if anticoagulation is continued.  -Discontinue LR mIVF @ 100mL/hr, ADAT  -CBC daily, transfuse for hgb < 7  -GI consult, recs appreciated -- signed off    -Protonix 40mg IV BID   -Hold anticoagulation until 7/12   -H pylori stool testing - start treatment empirically once sample obtained    -Repeat EGD  "in 2-3mo to confirm healing    -Avoid NSAIDs     MESFIN on CKD3a - resolved   HTN  Cr uptrended to as high as 1.4, now returned to baseline ~1.2.  Likely prerenal in setting of acute blood loss anemia.   -mIVF per above   -Continue PTA lisinopril, hold PTA lasix   -No indications to continue trending BMP at this time.     Chronic conditions:  CAD -  hold PTA ASA, continue atorvastatin   Hypothyroidism - continue PTA levothryoxine 50mcg d   MDD - PTA Wellbutrin PRN          Diet: Regular Diet Adult    DVT Prophylaxis: heparin gtt   Crowder Catheter: Not present  Lines: None     Cardiac Monitoring: None  Code Status: Full Code      Clinically Significant Risk Factors          # Hyperchloremia: Highest Cl = 110 mmol/L in last 2 days, will monitor as appropriate              # Hypertension: Noted on problem list              # Obesity: Estimated body mass index is 38.09 kg/m  as calculated from the following:    Height as of an earlier encounter on 7/8/25: 1.448 m (4' 9\").    Weight as of an earlier encounter on 7/8/25: 79.8 kg (176 lb)., PRESENT ON ADMISSION     # Asthma: noted on problem list        Social Drivers of Health    Physical Activity: Unknown (11/26/2024)    Exercise Vital Sign     Days of Exercise per Week: 7 days   Stress: Stress Concern Present (11/26/2024)    [employer] [place] [employer] - Occupational Stress Questionnaire     Feeling of Stress : Rather much   Social Connections: Unknown (11/26/2024)    Social Connection and Isolation Panel [NHANES]     Frequency of Social Gatherings with Friends and Family: Never          Disposition Plan     Medically Ready for Discharge: Anticipated Tomorrow           The patient's care was discussed with the Attending Physician, Dr. Doherty.    Park Ellison MD  Hospitalist Service  [facility]  Securely message with [employer] (more info)  Text page via [employer] Paging/Directory "   ______________________________________________________________________    Interval History   NAEO. Patient requesting to turn off fluids as she was feeling herself get tight and edematous. Very unhappy that IVC filter placed superiorly, rather than femorally. Denied chest pain, dyspnea, abdominal pain. Denies n/v, melena. No BM. Hungry and wondering if she can eat.     Physical Exam   Vital Signs: Temp: 98.5  F (36.9  C) Temp src: Oral BP: (!) 149/78 Pulse: 78   Resp: 18 SpO2: 97 % O2 Device: None (Room air) Oxygen Delivery: 3 LPM  Weight: 0 lbs 0 oz    GENERAL: Active, alert, in no acute distress.  LUNGS: Normal WOB, no respiratory distress, lungs clear to auscultation  HEART: Appears well-perfused. RRR, murmurs/rubs/clicks not appreciated   MSK: ROM of all 4 extremities grossly intact, trace BLE pitting edema   ABD: Nontender to palpation, nondistended, soft. Hypoactive BS.   SKIN: No obvious lesions on gross examination  NEUROLOGIC: Normal tone throughout. Normal reflexes for age  PSYCH: Flat affect, depressed       Medical Decision Making       ------------------ MEDICAL DECISION MAKING ------------------------------------------------------------------------------------------------------      Data   ------------------------- PAST 24 HR DATA REVIEWED -----------------------------------------------

## 2025-07-11 NOTE — PROGRESS NOTES
[facility] Progress Note     IMPRESSION:  Melena secondary to multiple large gastric ulcers noted on EGD  -with nonbleeding gastric ulcers, high suspicion for H. Pylori infection but no specimens collected given anticoagulation.  H. pylori stool antigen pending. Patient denies regular NSAID usage or traditional/herbal medications. No tobacco/alcohol use.     Acute blood loss anemia  DVT/PE - IVC filter placed on 7/10    RECOMMENDATIONS:    - Follow Hgb and  transfuse as necesssary    - Stool H. Pylori test pending. Once sample has been collected, would recommend starting empiric quadruple therapy for treatment (Clarithromycin, amoxicillin, metronidazole, and PPI) for 14 days.     - Continue Protonix 40mg twice daily     - Avoid NSAIDs    - Repeat EGD in 2 months to reassess for healing.  [facility] will help coordinate this.     - Diet: ADAT from GI standpoint    GI will sign off at this time. Thank you for consulting us on this pleasant patient. Please call if questions arise or the patient's status changes.         25 minutes of total time was spent providing patient care, including patient evaluation, reviewing documentation/test results, and     Mike Moody PA-C  [facility]  [phone]  ________________________________________________________________________      SUBJECTIVE:     at bedside. No symptomatic complaints today. No evidence of GI bleeding. She is hungry and would like to eat. She complains of IV fluids causing too much swelling in her extremities.      OBJECTIVE:  BP (!) 149/78 (BP Location: Right arm, Patient Position: Semi-Esparza's)   Pulse 78   Temp 98.5  F (36.9  C) (Oral)   Resp 18   LMP  (LMP Unknown)   SpO2 97%   Temp (24hrs), Av.2  F (36.8  C), Min:98  F (36.7  C), Max:98.5  F (36.9  C)    No data found.    Intake/Output Summary (Last 24 hours) at 2025 0894  Last data filed at 2025 0743  Gross per 24 hour   Intake 2780 ml   Output  1450 ml   Net 1330 ml        PHYSICAL EXAM  GEN: Alert, oriented x3, communicative and in NAD.    LYMPH: No LAD noted.  HRT: RRR  LUNGS: CTA  ABD:  ND, +BS, no guarding or pain to palpation, no rebound, no HSM.  SKIN: No rash, jaundice or spider angiomata      LABS:  I have reviewed the patient's new clinical lab results:     Recent Labs   Lab Test 07/11/25  0727 07/10/25  0627 07/09/25  1304 07/09/25  0708 07/08/25  1255   WBC 6.1 5.8  --  6.4  --    HGB 8.3* 8.2* 8.3* 6.9*  --    MCV 96 95 96 98  --     280  --  293  --    INR  --   --   --   --  0.95     Recent Labs   Lab Test 07/11/25  0727 07/10/25  0920 07/09/25  0708   POTASSIUM 4.2 3.9 3.9   CHLORIDE 110* 110* 115*   CO2 26 23 21*   BUN 11.7 17.6 31.5*   CR 1.16* 1.31* 1.41*   ANIONGAP 9 10 11     Recent Labs   Lab Test 07/08/25  1226 09/19/24  0956 08/06/24  1628 01/25/24  1621 10/05/23  1550 09/25/23  1547   ALBUMIN 3.7 3.8 3.9  --   --   --    BILITOTAL 0.4 0.6 0.4  --   --   --    ALT 25 23 30  --   --   --    AST 17 23 20  --   --   --    PROTEIN  --   --   --  Negative Negative Negative   LIPASE 72*  --   --   --   --   --          IMAGING:  Reviewed

## 2025-07-12 VITALS
OXYGEN SATURATION: 94 % | TEMPERATURE: 98.4 F | SYSTOLIC BLOOD PRESSURE: 146 MMHG | DIASTOLIC BLOOD PRESSURE: 83 MMHG | RESPIRATION RATE: 16 BRPM | HEART RATE: 83 BPM

## 2025-07-12 LAB
ERYTHROCYTE [DISTWIDTH] IN BLOOD BY AUTOMATED COUNT: 15 % (ref 10–15)
HCT VFR BLD AUTO: 26.3 % (ref 35–47)
HGB BLD-MCNC: 7.9 G/DL (ref 11.7–15.7)
HOLD SPECIMEN: NORMAL
MCH RBC QN AUTO: 28.3 PG (ref 26.5–33)
MCHC RBC AUTO-ENTMCNC: 30 G/DL (ref 31.5–36.5)
MCV RBC AUTO: 94 FL (ref 78–100)
PLATELET # BLD AUTO: 220 10E3/UL (ref 150–450)
RBC # BLD AUTO: 2.79 10E6/UL (ref 3.8–5.2)
WBC # BLD AUTO: 6 10E3/UL (ref 4–11)

## 2025-07-12 PROCEDURE — 250N000013 HC RX MED GY IP 250 OP 250 PS 637: Performed by: PHYSICIAN ASSISTANT

## 2025-07-12 PROCEDURE — 999N000147 HC STATISTIC PT IP EVAL DEFER

## 2025-07-12 PROCEDURE — 99239 HOSP IP/OBS DSCHRG MGMT >30: CPT | Mod: GC

## 2025-07-12 PROCEDURE — 250N000013 HC RX MED GY IP 250 OP 250 PS 637: Performed by: FAMILY MEDICINE

## 2025-07-12 PROCEDURE — 85018 HEMOGLOBIN: CPT

## 2025-07-12 PROCEDURE — 250N000011 HC RX IP 250 OP 636

## 2025-07-12 PROCEDURE — 250N000011 HC RX IP 250 OP 636: Performed by: FAMILY MEDICINE

## 2025-07-12 PROCEDURE — 250N000013 HC RX MED GY IP 250 OP 250 PS 637

## 2025-07-12 PROCEDURE — 36415 COLL VENOUS BLD VENIPUNCTURE: CPT

## 2025-07-12 RX ORDER — TETRACYCLINE HYDROCHLORIDE 250 MG/1
250 CAPSULE ORAL 4 TIMES DAILY
Status: CANCELLED | OUTPATIENT
Start: 2025-07-12

## 2025-07-12 RX ORDER — PANTOPRAZOLE SODIUM 40 MG/1
40 TABLET, DELAYED RELEASE ORAL 2 TIMES DAILY
Qty: 26 TABLET | Refills: 0 | Status: SHIPPED | OUTPATIENT
Start: 2025-07-12 | End: 2025-07-25

## 2025-07-12 RX ORDER — AMOXICILLIN 500 MG/1
1000 CAPSULE ORAL 2 TIMES DAILY
Qty: 52 CAPSULE | Refills: 0 | Status: SHIPPED | OUTPATIENT
Start: 2025-07-12 | End: 2025-07-25

## 2025-07-12 RX ORDER — METRONIDAZOLE 500 MG/1
500 TABLET ORAL 3 TIMES DAILY
Status: CANCELLED | OUTPATIENT
Start: 2025-07-12

## 2025-07-12 RX ORDER — BISMUTH SUBSALICYLATE 262 MG/1
524 TABLET, CHEWABLE ORAL
Status: CANCELLED | OUTPATIENT
Start: 2025-07-12

## 2025-07-12 RX ORDER — METRONIDAZOLE 500 MG/1
500 TABLET ORAL 3 TIMES DAILY
Qty: 39 TABLET | Refills: 0 | Status: SHIPPED | OUTPATIENT
Start: 2025-07-12 | End: 2025-07-25

## 2025-07-12 RX ORDER — CLARITHROMYCIN 250 MG/1
250 TABLET, FILM COATED ORAL EVERY 12 HOURS
Qty: 26 TABLET | Refills: 0 | Status: SHIPPED | OUTPATIENT
Start: 2025-07-12 | End: 2025-07-25

## 2025-07-12 RX ADMIN — CLARITHROMYCIN 250 MG: 250 TABLET, FILM COATED ORAL at 08:26

## 2025-07-12 RX ADMIN — AMOXICILLIN 1000 MG: 250 CAPSULE ORAL at 08:27

## 2025-07-12 RX ADMIN — ONDANSETRON 4 MG: 2 INJECTION, SOLUTION INTRAMUSCULAR; INTRAVENOUS at 14:22

## 2025-07-12 RX ADMIN — PANTOPRAZOLE SODIUM 40 MG: 40 INJECTION, POWDER, FOR SOLUTION INTRAVENOUS at 08:28

## 2025-07-12 RX ADMIN — METRONIDAZOLE 500 MG: 500 TABLET ORAL at 14:22

## 2025-07-12 RX ADMIN — ALLOPURINOL 100 MG: 100 TABLET ORAL at 08:26

## 2025-07-12 RX ADMIN — LISINOPRIL 20 MG: 20 TABLET ORAL at 08:27

## 2025-07-12 RX ADMIN — LEVOTHYROXINE SODIUM 50 MCG: 0.03 TABLET ORAL at 08:27

## 2025-07-12 RX ADMIN — METRONIDAZOLE 500 MG: 500 TABLET ORAL at 08:28

## 2025-07-12 ASSESSMENT — ACTIVITIES OF DAILY LIVING (ADL)
ADLS_ACUITY_SCORE: 35

## 2025-07-12 NOTE — PROGRESS NOTES
Physical Therapy     Chart reviewed; orders acknowledged. Pt is independent with functional mobility and has no concerns; she will be going home with family. PT evaluation not indicated; will complete orders. Plan was discussed with RN. Please reorder if status changes. Thank you.          7/12/2025 by Judit Warren DPT

## 2025-07-12 NOTE — PLAN OF CARE
Problem: Adult Inpatient Plan of Care  Goal: Optimal Comfort and Wellbeing  Outcome: Progressing   Goal Outcome Evaluation:    Pt c/o nausea.  Gave prn Zofran 4 mg x 1.  No emesis.  Bag provided at bedside. Pt also reported loose stool x 2 on evening. Pt ate food from home.  Had visitors this evening.

## 2025-07-12 NOTE — PLAN OF CARE
Goal Outcome Evaluation:      Plan of Care Reviewed With: patient    Overall Patient Progress: improving    Pt is A & O x4.   Pain denies   Lines both IV  left arm, assessed, blood return noticed, denies any soreness or any IV discomfort   Respirations even and unlabored. Speak in full sentences   No distress noted on assessments.  Skin assessed and intact   Saturation at 94 %   Denies SOB/ difficult of breathing/ chest pain  Plan discharge home   Care explained. Slept between cares  Call light within reach   Plan of care on going   Family member stayed in room with patient   /59 (BP Location: Right arm)   Pulse 85   Temp 98.9  F (37.2  C) (Oral)   Resp 16   LMP  (LMP Unknown)   SpO2 94%      Julia Ruffin RN on 7/12/2025 at 2:39 AM      Problem: Adult Inpatient Plan of Care  Goal: Absence of Hospital-Acquired Illness or Injury  Intervention: Identify and Manage Fall Risk  Recent Flowsheet Documentation  Taken 7/12/2025 0015 by Julia Ruffin, RN  Safety Promotion/Fall Prevention:   clutter free environment maintained   lighting adjusted   nonskid shoes/slippers when out of bed   patient and family education   room door open   treat underlying cause   treat reversible contributory factors     Problem: Adult Inpatient Plan of Care  Goal: Absence of Hospital-Acquired Illness or Injury  Intervention: Prevent Skin Injury  Recent Flowsheet Documentation  Taken 7/12/2025 0015 by Julia Ruffin, RN  Body Position: position changed independently     Problem: Adult Inpatient Plan of Care  Goal: Absence of Hospital-Acquired Illness or Injury  Intervention: Prevent Infection  Recent Flowsheet Documentation  Taken 7/12/2025 0015 by Julia Ruffin, RN  Infection Prevention: hand hygiene promoted     Problem: Adult Inpatient Plan of Care  Goal: Optimal Comfort and Wellbeing  Outcome: Progressing

## 2025-07-12 NOTE — DISCHARGE SUMMARY
"[facility]  Discharge Summary - Medicine & Pediatrics       Date of Admission:  7/8/2025  Date of Discharge:  7/12/2025  Discharging Provider: Dr. Martinez, Dr. Doherty  Discharge Service: Hospitalist Service    Discharge Diagnoses   Chest pain, likely 2/2 R-sided PE  LLE DVT  S/p IVC filter placement  Acute blood loss anemia 2/2 gastric ulcers   MESFIN on CKD3a - resolved   HTN    Clinically Significant Risk Factors     # Obesity: Estimated body mass index is 38.09 kg/m  as calculated from the following:    Height as of an earlier encounter on 7/8/25: 1.448 m (4' 9\").    Weight as of an earlier encounter on 7/8/25: 79.8 kg (176 lb).       Follow-ups Needed After Discharge   Follow-up Appointments       Follow Up      Follow up with [facility] for repeat EGD in 2 months. [facility] will help to coordinate this.        Hospital Follow-up with Existing Primary Care Provider (PCP)          Schedule Primary Care visit within: 7 Days           Will need to discuss when to remove IVC filter    Unresulted Labs Ordered in the Past 30 Days of this Admission       Date and Time Order Name Status Description    7/9/2025  9:52 AM Helicobacter pylori Antigen Stool In process     7/8/2025  5:25 PM Prepare red blood cells (unit) Preliminary         These results will be followed up by PCP    Discharge Disposition   Discharged to home  Condition at discharge: Stable    Hospital Course   Jamee Muhammad was admitted on 7/8/2025 for chest pain found to have PE and DVT. Also noted to have anemia secondary to gastric ulcers. The following problems were addressed during her hospitalization:    Chest pain, likely 2/2 R-sided PE  LLE DVT   Presented w/ 3 weeks of intermittent episodes of intense fatigue and sharp chest pain that significantly worsened overnight. D-dimer significantly elevated, so CT PE was pursued showing R-sided PE w/o evidence of RH strain. DVT US 7/9 showed long segment DVT of LLE common femoral to calf veins. Unclear " trigger - no recent travel, no known malignancy. Daughter does report she has been more immobile d/t the chest pain. ACS workup negative - EKG NSR, trops downtrending, BNP wnl. Lipase wnl. IVC filter placed 7/10 d/t inability to anticoagulate in setting of UGIB.     Acute blood loss anemia 2/2 gastric ulcers  Hgb downtrended to as low as 6.9, requiring 1u pRBC with improvement and stabilization ~8, baseline ~11. Epigastric pain now tolerable. EGD 7/7 showed gastric ulcers as large as 30mm - no active bleeding, but scattered hematin suggestive of recent bleed. Bx not taken d/t anticoagulation, though high suspicions for H pylori. Per discussion w/GI they are concerned the bleeding will worsen and ulceration will be irreparable if anticoagulation is continued. H pylori pending, started on therapy due to high suspicion of H. Pylori.   - recommend recheck of Hemoglobin at hospital follow up  - Empiric H Pylori treatment   - Clarithromycin, amoxicillin, metronidazole, and PPI for 13 additional days    -Repeat EGD in 2-3mo to confirm healing    -Avoid NSAIDs      MESFIN on CKD3a - resolved   HTN  Cr uptrended to as high as 1.4, now returned to baseline ~1.2.  Likely prerenal in setting of acute blood loss anemia.   - consider BMP at hospital follow up    Consultations This Hospital Stay   GASTROENTEROLOGY IP CONSULT  PHARMACY IP CONSULT  INTERVENTIONAL RADIOLOGY ADULT/PEDS IP CONSULT  PHYSICAL THERAPY ADULT IP CONSULT    Code Status   Full Code     The patient was discussed with Dr. Doherty.     Yi Martinez MD  [place] [facility] [address] [place] [address]  Phone: [phone]  Fax: [phone]  ______________________________________________________________________    Physical Exam   Vital Signs: Temp: 98.4  F (36.9  C) Temp src: Oral BP: (!) 146/83 Pulse: 83   Resp: 16 SpO2: 94 % O2 Device: None (Room air)    Weight: 0 lbs 0 oz    Constitutional: awake, alert,  cooperative, no apparent distress  Respiratory: Clear to auscultation bilaterally, no crackles or wheezing  Cardiovascular: Regular rate and rhythm, normal S1 and S2  GI: Soft, non-distended, no tenderness to palpation  Skin: normal skin color, texture, turgor  Musculoskeletal: Full range of motion and strength in left foot. No erythema or warmth to touch.   Neurologic: Awake, alert, oriented to name, place and time.  Cranial nerves II-XII are grossly intact.    Primary Care Physician   Benjamin Rosenstein    Discharge Orders      Brief Discharge Instructions    Inferior Vena Cava Filter Discharge Instructions:  You had an inferior vena cava (IVC) filter placed on 7/10/25. An IVC filter is a mechanical device used to prevent life-threatening pulmonary emboli. A small puncture was made into your vein and a filter was placed in the large vein in your abdomen (IVC).     Please follow these instructions as you recover:    Care instructions:  - If you received sedation for your procedure, do not drive or operate heavy machinery for the rest of the day.  - Resume your normal activities as you tolerate.   - Remove dressing tomorrow.   - You may shower tomorrow.   - Do not submerge in stagnant water (tub bath, Jacuzzi, pool, lake) for 3 days.    - Do not apply any creams, ointments or lotions over puncture site.   - Observe site for infection (redness, purulent drainage, increasing pain, excessive warmth).    Follow Up:  Please address the need to have your filter removed with your primary care provider, hematologist or surgeon approximately one month after your filter placement. [facility] recommends filter removal within 3 months of placement if your provider feels you are safe to have the filter removed.     Please seek medical evaluation for:   - Uncontrolled bleeding from puncture site.   - Fever (greater than 101 F (38.3C))  - Purulent (yellow/green/foul smelling) drainage from catheter insertion site.  -  Increasing pain, swelling or redness at puncture site.     Reason for your hospital stay    You were hospitalized for chest pain. You were found to have blood clots in your lungs and your left leg. A filter was placed to help the clots from breaking off and going elsewhere in the body. You also were found to have bleeding ulcers in your stomach. You should avoid any NSAIDs or blood thinning medications.     Activity    Your activity upon discharge: activity as tolerated     Follow Up    Follow up with [facility] for repeat EGD in 2 months. [facility] will help to coordinate this.     Diet    Follow this diet upon discharge: Current Diet:Orders Placed This Encounter      Regular Diet Adult     Hospital Follow-up with Existing Primary Care Provider (PCP)            Significant Results and Procedures   Most Recent 3 CBC's:  Recent Labs   Lab Test 07/12/25  0918 07/11/25  0727 07/10/25  0627   WBC 6.0 6.1 5.8   HGB 7.9* 8.3* 8.2*   MCV 94 96 95    244 280     Most Recent 3 BMP's:  Recent Labs   Lab Test 07/11/25  0727 07/10/25  0920 07/09/25  1911 07/09/25  0708    143  --  147*   POTASSIUM 4.2 3.9  --  3.9   CHLORIDE 110* 110*  --  115*   CO2 26 23  --  21*   BUN 11.7 17.6  --  31.5*   CR 1.16* 1.31*  --  1.41*   ANIONGAP 9 10  --  11   GIACOMO 9.2 8.9  --  8.8   GLC 81 93 90 95     Most Recent 2 LFT's:  Recent Labs   Lab Test 07/08/25  1226 09/19/24  0956   AST 17 23   ALT 25 23   ALKPHOS 76 79   BILITOTAL 0.4 0.6     Most Recent D-dimer:  Recent Labs   Lab Test 07/08/25  1255   DD 3.96*     Results for orders placed or performed during the hospital encounter of 07/08/25   CT Chest Pulmonary Embolism w Contrast    Narrative    EXAM: CT CHEST PULMONARY EMBOLISM W CONTRAST  LOCATION: [facility]  DATE: 7/8/2025    INDICATION: SOB, chest pain, positive D-dimer. Evaluate for PE.  COMPARISON: CT 9/15/2023.  TECHNIQUE: CT chest pulmonary angiogram during arterial phase injection of IV contrast.  Multiplanar reformats and MIP reconstructions were performed. Dose reduction techniques were used.   CONTRAST: 75 mL isovue 370    FINDINGS:  ANGIOGRAM CHEST: Examination is positive for right-sided pulmonary embolism. There is nonocclusive clot adherent to the wall at the bifurcation of the right main pulmonary artery with extension into the lobar segments. The clot burden is mild. No   left-sided emboli. Thoracic aorta is negative for dissection. No CT evidence of right heart strain.    LUNGS AND PLEURA: Low lung volumes with bibasilar atelectasis. No evidence for pulmonary consolidation or infarct. No pleural fluid. 4 mm nodule in the right upper lobe remains stable and should be benign.    MEDIASTINUM/AXILLAE: Normal.    CORONARY ARTERY CALCIFICATION: Mild.    UPPER ABDOMEN: Cholecystectomy. Mild hepatic steatosis.    MUSCULOSKELETAL: Normal.      Impression    IMPRESSION:  1.  Examination is positive for right-sided pulmonary emboli. The clot burden is mild. No left-sided emboli.  2.  No evidence for pulmonary infarct or right heart strain.  3.  Low lung volumes with bibasilar atelectasis.  4.  Stable 4 mm nodule right upper lobe should be benign.  5.  Hepatic steatosis.     CT Abdomen Pelvis w Contrast    Narrative    EXAM: CT ABDOMEN PELVIS W CONTRAST  LOCATION: [facility]  DATE: 7/8/2025    INDICATION: Epigastric abdominal pain  COMPARISON: CT 5/14/2014  TECHNIQUE: CT scan of the abdomen and pelvis was performed following injection of IV contrast. Multiplanar reformats were obtained. Dose reduction techniques were used.  CONTRAST: 75 mL Isovue 370    FINDINGS:   LOWER CHEST: Bibasilar atelectasis.    HEPATOBILIARY: Cholecystectomy. Mild hepatic steatosis. No biliary ductal dilatation.    PANCREAS: Normal.    SPLEEN: Normal.    ADRENAL GLANDS: Normal.    KIDNEYS/BLADDER: Normal.    BOWEL: No evidence for bowel obstruction or inflammatory changes. No gastric wall thickening or  inflammation. Prior appendectomy.    LYMPH NODES: No lymphadenopathy.    VASCULATURE: Normal.    PELVIC ORGANS: No adnexal lesions or free fluid.    MUSCULOSKELETAL: Postoperative changes lower anterior abdominal wall likely due to prior hernia repair. Compression fracture deformity involving the superior endplate of the L4 vertebral body with sclerosis is new since the prior CT.      Impression    IMPRESSION:   1.  No evidence for bowel obstruction or inflammatory changes.  2.  Cholecystectomy.  3.  Mild hepatic steatosis.  4.  Chronic appearing compression fracture deformity superior endplate of L4 vertebral body.     US Lower Extremity Venous Duplex Bilateral    Narrative    EXAM: US LOWER EXTREMITY VENOUS DUPLEX BILATERAL  LOCATION: [facility]  DATE: 7/9/2025    INDICATION: R sided PE, r o DVT. Determing if patient needs IVC filter.  COMPARISON: None.  TECHNIQUE: Venous Duplex ultrasound of bilateral lower extremities with and without compression, augmentation and duplex. Color flow and spectral Doppler with waveform analysis performed.    FINDINGS: Exam includes the common femoral, femoral, popliteal veins as well as segmentally visualized deep calf veins and greater saphenous vein.     RIGHT: No deep vein thrombosis. No superficial thrombophlebitis. No popliteal cyst.    LEFT: Nonocclusive DVT from the common femoral vein to the popliteal vein. This subsequently becomes occlusive distal to this in the peroneal and posterior tibial veins. No superficial thrombophlebitis. No popliteal cyst.      Impression    IMPRESSION:  1.  Long segment DVT of the left lower extremity from the common femoral vein to the calf veins.  2.  No right lower extremity DVT.    Findings discussed with Dr. Le at 1437 hours.    IR IVC Filter Placement    Narrative    LOCATION: [facility]  DATE: 7/10/2025    PROCEDURE: INFERIOR VENA CAVOGRAM AND INFERIOR VENA CAVA FILTER  PLACEMENT  1.  Ultrasound-guided access of the right internal jugular vein. A permanent image was stored.  2.  Digital subtraction inferior cavography.  3.  Inferior vena cava filter placement.  4.  Moderate sedation.    INTERVENTIONAL RADIOLOGIST: Cheryl Rao MD    INDICATION: Venous thromboembolism with contraindication/complication to anticoagulation, plan for inferior vena cava filter placement..    CONSENT: The risks, benefits and alternatives of the procedure were discussed with the patient or representative in detail. All questions were answered. Informed consent was given to proceed with the procedure.    MODERATE SEDATION: Versed 2 mg IV; Fentanyl 25 mcg IV. During the time out, immediately prior to the administration of medications, the patient was reassessed for adequacy to receive conscious sedation.  Under physician supervision, Versed and fentanyl   were administered for moderate sedation. Pulse oximetry, heart rate and blood pressure were continuously monitored by an independent trained observer. The physician spent 15 minutes of face-to-face sedation time with the patient.    CONTRAST: 60 cc Visipaque    FLUOROSCOPIC TIME: 1.6 minutes.  RADIATION DOSE: Air Kerma: 311 mGy.    COMPLICATIONS: No immediate complications.    UNIVERSAL PROTOCOL: The operative site was marked and any prior imaging was reviewed. Required items including blood products, implants, devices and special equipment was made available. Patient identity was confirmed either verbally, with demographic   information, hospital assigned identification or other identification markers. A timeout was performed immediately prior to the procedure.    STERILE BARRIER TECHNIQUE: Maximum sterile barrier technique was used. Cutaneous antisepsis was performed at the operative site with application of 2% chlorhexidine and large sterile drape. Prior to the procedure, the  and assistant performed   hand hygiene and wore hat, mask, sterile  gown, and sterile gloves during the entire procedure.    PROCEDURE:    The right internal jugular vein was punctured under real-time ultrasound guidance. A marked sheath was positioned in the IVC. A diagnostic inferior vena cavagram was obtained to evaluate for caval thrombus, renal vein anatomy/anomalies and location,   caval anatomy/anomalies and diameter. Following the diagnostic study, a Louise inferior vena caval filter was deployed just below the lowest renal vein. Post placement fluoroscopy demonstrated appropriate position. The sheath was then removed and   compression applied to the puncture site until hemostasis was achieved.    FINDINGS:  Ultrasound shows an anechoic and compressible jugular vein. The diagnostic inferior vena cavagram shows a normal-caliber vena cava without anatomic anomaly or thrombus.      Impression    IMPRESSION:    1. Successful placement of an inferior vena cava filter, as discussed above.    PLAN:  A retrievable filter was placed. Please contact the department of Interventional Radiology ([phone]) once the filter is no longer medically necessary to have the filter removed.       Echocardiogram Complete     Value    LVEF  >70%    Narrative    [id number]  [id number]  [id number]  [id number]^MALIKA^[place] [address]     Name: ERIC RODRIGUEZ  MRN: [id number]  : 1955  Study Date: 2025 09:35 AM  Age: 70 yrs  Gender: Female  Patient Location: [facility]  Reason For Study: Pulmonary Embolism  Ordering Physician: MIREILLE DALY  Performed By: BP^^^^     BSA: 1.7 m2  Height: 57 in  Weight: 176 lb  HR: 80  BP: 144/73 mmHg  ______________________________________________________________________________  Procedure  Echocardiogram with two-dimensional, color and spectral Doppler. Definity (NDC  #32120-928) given intravenously. Adequate quality two-dimensional was  performed and interpreted. Adequate quality color and spectral Doppler  were  performed and interpreted. Compared to the prior study dated 8/22/2024, there  are changes as noted.  ______________________________________________________________________________  Interpretation Summary     1. Left ventricular chamber size and wall thickness are normal. Systolic  function is hyperdynamic with a visually estimated left ventricular ejection  fraction greater than 70%.  2. Right ventricular chamber size and systolic function are normal.  3. No hemodynamically significant valvular abnormalities.  4. Compared to the prior study dated 8/22/2024, there has been no significant  change.  ______________________________________________________________________________  I      WMSI = 0.00     % Normal = 0     X - Cannot   0 -                      (2) - Mildly 2 -          Segments  Size  Interpret    Hyperkinetic 1 - Normal  Hypokinetic  Hypokinetic  1-2     small                                                 7 -          3-5      moderate  3 - Akinetic 4 -          5 -         6 - Akinetic Dyskinetic   6-14    large           Dyskinetic   Aneurysmal  w/scar       w/scar       15-16   diffuse     Left Ventricle  The left ventricle is normal in size. There is normal left ventricular wall  thickness. Hyperdynamic left ventricular function. The visual ejection  fraction is >70%. Left ventricular diastolic function is normal. Diastolic  Doppler findings (E/E' ratio and/or other parameters) suggest left ventricular  filling pressures are normal. No regional wall motion abnormalities noted.     Right Ventricle  The right ventricle is normal size. The right ventricular systolic function is  normal.     Atria  Normal left atrial size. Right atrial size is normal.     Mitral Valve  Mitral valve leaflets appear normal. There is trace mitral regurgitation.  There is no mitral valve stenosis.     Tricuspid Valve  Tricuspid valve leaflets appear normal. There is trace tricuspid  regurgitation. Right ventricular  systolic pressure could not be approximated  due to inadequate tricuspid regurgitation. There is no tricuspid stenosis.     Aortic Valve  Aortic valve leaflets appear normal. The aortic valve is trileaflet. No aortic  regurgitation is present. No aortic stenosis is present.     Pulmonic Valve  The pulmonic valve is not well seen, but is grossly normal. There is mild (1+)  pulmonic valvular regurgitation. There is no pulmonic valvular stenosis.     Vessels  The aorta root is normal. The thoracic aorta is normal. IVC diameter <2.1 cm  collapsing >50% with sniff suggests a normal RA pressure of 3 mmHg.     Pericardium  There is no pericardial effusion.     Rhythm  Sinus rhythm was noted.     ______________________________________________________________________________  MMode/2D Measurements & Calculations  IVSd: 0.90 cm  LVIDd: 4.4 cm  LVIDs: 2.4 cm  LVPWd: 0.90 cm  FS: 45.7 %     LV mass(C)d: 125.6 grams  LV mass(C)dI: 73.8 grams/m2  Ao root diam: 3.4 cm  LA dimension: 3.1 cm  asc Aorta Diam: 3.8 cm  LA/Ao: 0.91  LVOT diam: 1.7 cm  LVOT area: 2.3 cm2  Ao root diam index Ht(cm/m): 2.3  Ao root diam index BSA (cm/m2): 2.0  Asc Ao diam index BSA (cm/m2): 2.2  Asc Ao diam index Ht(cm/m): 2.6  EF Biplane: 76.6 %  LA Volume (BP): 38.4 ml     LA Volume Index (BP): 22.6 ml/m2  LA Volume Indexed (AL/bp): 23.5 ml/m2  RWT: 0.41  TAPSE: 1.7 cm     Time Measurements  MM HR: 80.0 BPM     Doppler Measurements & Calculations  MV E max aden: 80.1 cm/sec  MV A max aden: 96.8 cm/sec  MV E/A: 0.83  MV dec time: 0.24 sec  Ao V2 max: 184.0 cm/sec  Ao max P.0 mmHg  Ao V2 mean: 118.0 cm/sec  Ao mean P.0 mmHg  Ao V2 VTI: 37.0 cm  NEETA(I,D): 1.8 cm2  NEETA(V,D): 1.6 cm2  LV V1 max P.9 mmHg  LV V1 max: 131.0 cm/sec  LV V1 VTI: 29.6 cm  SV(LVOT): 67.2 ml  SI(LVOT): 39.4 ml/m2  PA V2 max: 126.0 cm/sec  PA max P.4 mmHg  PA mean PG: 3.0 mmHg  PA V2 VTI: 27.2 cm  PA acc time: 0.11 sec  PI end-d aden: 118.0 cm/sec  AV Aden Ratio (DI):  0.71  NEETA Index (cm2/m2): 1.1  E/E': 9.2  E/E' avg: 10.2  Lateral E/e': 9.2  Medial E/e': 11.2  Peak E' Aden: 8.7 cm/sec  RV S Aden: 12.8 cm/sec     ______________________________________________________________________________  Report approved by: Steven Lopes MD on 07/11/2025 11:40 AM             Discharge Medications      Review of your medicines        PAUSE taking these medications        Dose / Directions   aspirin 81 MG EC tablet  Wait to take this until your doctor or other care provider tells you to start again.  Used for: History of non-ST elevation myocardial infarction (NSTEMI), Coronary artery disease involving native coronary artery of native heart without angina pectoris      Dose: 81 mg  Take 1 tablet (81 mg) by mouth daily.  Quantity: 90 tablet  Refills: 4     colchicine 0.6 MG tablet  Wait to take this until your doctor or other care provider tells you to start again.  Commonly known as: COLCRYS  Used for: Elevated uric acid in blood      Dose: 0.6 mg  Take 1 tablet (0.6 mg) by mouth daily.  Quantity: 30 tablet  Refills: 1            START taking        Dose / Directions   amoxicillin 500 MG capsule  Commonly known as: AMOXIL  Indication: Empiric treatment of H pylori  Used for: Acute gastric ulcer with hemorrhage      Dose: 1,000 mg  Take 2 capsules (1,000 mg) by mouth 2 times daily for 13 days.  Quantity: 52 capsule  Refills: 0     clarithromycin 250 MG tablet  Commonly known as: BIAXIN  Indication: Empiric treatment for H pylori  Used for: Acute gastric ulcer with hemorrhage      Dose: 250 mg  Take 1 tablet (250 mg) by mouth every 12 hours for 13 days.  Quantity: 26 tablet  Refills: 0     metroNIDAZOLE 500 MG tablet  Commonly known as: FLAGYL  Indication: Empiric treatment H pylori  Used for: Acute gastric ulcer with hemorrhage      Dose: 500 mg  Take 1 tablet (500 mg) by mouth 3 times daily for 13 days.  Quantity: 39 tablet  Refills: 0     pantoprazole 40 MG EC tablet  Commonly known as:  PROTONIX  Used for: Acute gastric ulcer with hemorrhage      Dose: 40 mg  Take 1 tablet (40 mg) by mouth 2 times daily for 13 days.  Quantity: 26 tablet  Refills: 0            CONTINUE these medicines which have NOT CHANGED        Dose / Directions   acetaminophen 500 MG tablet  Commonly known as: TYLENOL  Used for: SI (sacroiliac) joint dysfunction      Dose: 500-1,000 mg  Take 1-2 tablets (500-1,000 mg) by mouth every 6 hours as needed for mild pain  Quantity: 200 tablet  Refills: 3     allopurinol 100 MG tablet  Commonly known as: ZYLOPRIM  Used for: Idiopathic chronic gout of multiple sites without tophus      Dose: 100 mg  Take 1 tablet (100 mg) by mouth daily.  Quantity: 90 tablet  Refills: 1     atorvastatin 40 MG tablet  Commonly known as: LIPITOR  Used for: Coronary artery disease involving native coronary artery of native heart without angina pectoris      Dose: 40 mg  Take 1 tablet (40 mg) by mouth every evening.  Quantity: 90 tablet  Refills: 3     buPROPion 150 MG 24 hr tablet  Commonly known as: WELLBUTRIN XL      Dose: 150 mg  Take 150 mg by mouth daily as needed.  Refills: 0     furosemide 20 MG tablet  Commonly known as: LASIX  Used for: Dyspnea on exertion, Swelling of both lower extremities      Dose: 20 mg  Take 1 tablet (20 mg) by mouth daily.  Quantity: 30 tablet  Refills: 1     levothyroxine 50 MCG tablet  Commonly known as: SYNTHROID/LEVOTHROID  Used for: Acquired hypothyroidism      Dose: 50 mcg  Take 1 tablet (50 mcg) by mouth daily.  Quantity: 90 tablet  Refills: 1     lisinopril 20 MG tablet  Commonly known as: ZESTRIL  Used for: Essential hypertension      Dose: 20 mg  Take 1 tablet (20 mg) by mouth 2 times daily.  Quantity: 90 tablet  Refills: 1     vitamin D3 50 mcg (2000 units) tablet  Commonly known as: CHOLECALCIFEROL      Dose: 1 tablet  Take 1 tablet by mouth every evening.  Refills: 0               Where to get your medicines        These medications were sent to [facility]  # [id number] - [place] - [address]  [address] [place] [address]      Phone: [phone]   amoxicillin 500 MG capsule  clarithromycin 250 MG tablet  metroNIDAZOLE 500 MG tablet  pantoprazole 40 MG EC tablet       Allergies   Allergies   Allergen Reactions    Celery Oil Other (See Comments)     Swelling, difficulty breathing    Doxycycline Hives    Gadavist [Gadobutrol] Other (See Comments) and Headache     Patient had sensitivity to the Gadavist on 6/17/2015.  She claimed she could not breath or swallow well.  After a few minutes she was fine.  Dr. Valente took her vitals and accessed she was fine and probably did not have a reaction.  Dr. Valente recommends the patient have MRI studies done in a hospital setting from now on.  Dr. Mae recommended on 6/18/2015 that the patient be pre-medicated if she needs MRI contrast in the future. - CAB    Other Allergy (See Comments) [External Allergen Needs Reconciliation - See Comment] Unknown     Other, 05/30/2013.  Action: [place].      Avocado Extract Allergy Skin Test Rash    Cashew Nut Oil Rash

## 2025-07-12 NOTE — PLAN OF CARE
Problem: Adult Inpatient Plan of Care  Goal: Plan of Care Review  Outcome: Met  Flowsheets (Taken 7/12/2025 1443)  Outcome Evaluation: Ready for discharge  Plan of Care Reviewed With:   patient   spouse  Overall Patient Progress: improving  Goal: Patient-Specific Goal (Individualized)  Outcome: Met  Goal: Absence of Hospital-Acquired Illness or Injury  Outcome: Met  Goal: Optimal Comfort and Wellbeing  Outcome: Met  Intervention: Monitor Pain and Promote Comfort  Recent Flowsheet Documentation  Taken 7/12/2025 0834 by Arnulfo Fishman RN  Pain Management Interventions:   medication (see MAR)   rest    Problem: Surgery Nonspecified  Goal: Absence of Bleeding  Outcome: Met     Problem: Surgery Nonspecified  Goal: Fluid and Electrolyte Balance  Outcome: Met     Problem: Surgery Nonspecified  Goal: Absence of Infection Signs and Symptoms  Outcome: Met     Problem: Surgery Nonspecified  Goal: Optimal Pain Control and Function  Intervention: Prevent or Manage Pain  Recent Flowsheet Documentation  Taken 7/12/2025 0834 by Arnulfo Fishman RN  Pain Management Interventions:   medication (see MAR)   rest   Problem: Surgery Nonspecified  Goal: Nausea and Vomiting Relief  Outcome: Met     Problem: Surgery Nonspecified  Goal: Effective Urinary Elimination  Outcome: Met     Problem: Surgery Nonspecified  Goal: Effective Oxygenation and Ventilation  Outcome: Met     Goal: Readiness for Transition of Care  Outcome: Met     Goal Outcome Evaluation:      Plan of Care Reviewed With: patient, spouse    Overall Patient Progress: improvingOverall Patient Progress: improving    Outcome Evaluation: Ready for discharge    A/ox3. VSS. Afebrile. Ambulates independently to BR and HW. Spouse at bedside. Makes needs known. Reported nausea x1, PRN zofran given, effective. Appetite fair, has food from home in room. Fluid intake adequate. Pain reprted to L foot, side of big toe, states that it is gout flare up, has allopurinol scheduled. No s/s of CP  or SOB, dsypnea noted upon exertion. Denies headaches, dizziness. PIV x2 deaccessed. Discharge education completed, no questions or concerns. Family will transport home.     Arnulfo Fishman RN

## 2025-07-14 LAB — H PYLORI AG STL QL IA: NEGATIVE

## 2025-07-15 ENCOUNTER — TELEPHONE (OUTPATIENT)
Dept: FAMILY MEDICINE | Facility: CLINIC | Age: 70
End: 2025-07-15
Payer: COMMERCIAL

## 2025-07-15 NOTE — TELEPHONE ENCOUNTER
Patient's daughter had concerns about keep taking the medication for H. Pylori. Message was sent to Dr. Rosenstein who said that he would like the patient to continue to medications as Protonix can cause a false negative. Message with left with patient's daughter and instructions to call if they have any questions.

## 2025-07-15 NOTE — TELEPHONE ENCOUNTER
[facility]  Discharge Summary - Medicine & Pediatrics       Date of Admission:  7/8/2025  Date of Discharge:  7/12/2025  Discharging Provider: Dr. Martinez, Dr. Doherty  Discharge Service: Hospitalist Service     Discharge Diagnoses  Chest pain, likely 2/2 R-sided PE  LLE DVT  S/p IVC filter placement  Acute blood loss anemia 2/2 gastric ulcers   MESFIN on CKD3a - resolved   HTN    Patient Quality Outreach    Patient is due for the following:   Hospital Follow up    Action(s) Taken:   Patient was scheduled for 7/29/25 @ 2:40pm with Dr. Rosenstein    Type of outreach:    Phone, spoke to patient/parent. Shalonda (Daughter) assisted with scheduling appointment.

## 2025-07-19 ENCOUNTER — HEALTH MAINTENANCE LETTER (OUTPATIENT)
Age: 70
End: 2025-07-19

## 2025-08-15 ENCOUNTER — OFFICE VISIT (OUTPATIENT)
Dept: FAMILY MEDICINE | Facility: CLINIC | Age: 70
End: 2025-08-15
Payer: COMMERCIAL

## 2025-08-15 VITALS
DIASTOLIC BLOOD PRESSURE: 76 MMHG | RESPIRATION RATE: 19 BRPM | OXYGEN SATURATION: 97 % | BODY MASS INDEX: 37.97 KG/M2 | HEART RATE: 79 BPM | SYSTOLIC BLOOD PRESSURE: 136 MMHG | TEMPERATURE: 98.7 F | WEIGHT: 176 LBS | HEIGHT: 57 IN

## 2025-08-15 DIAGNOSIS — Z09 HOSPITAL DISCHARGE FOLLOW-UP: ICD-10-CM

## 2025-08-15 DIAGNOSIS — Z12.11 SCREEN FOR COLON CANCER: Primary | ICD-10-CM

## 2025-08-15 DIAGNOSIS — Z13.9 SCREENING FOR CONDITION: ICD-10-CM

## 2025-08-15 DIAGNOSIS — Z23 NEED FOR VACCINATION: ICD-10-CM

## 2025-08-15 DIAGNOSIS — M1A.09X0 IDIOPATHIC CHRONIC GOUT OF MULTIPLE SITES WITHOUT TOPHUS: ICD-10-CM

## 2025-08-15 DIAGNOSIS — K25.4 CHRONIC GASTRIC ULCER WITH HEMORRHAGE: ICD-10-CM

## 2025-08-15 LAB
ANION GAP SERPL CALCULATED.3IONS-SCNC: 10 MMOL/L (ref 7–15)
BUN SERPL-MCNC: 46.3 MG/DL (ref 8–23)
CALCIUM SERPL-MCNC: 8.7 MG/DL (ref 8.8–10.4)
CHLORIDE SERPL-SCNC: 114 MMOL/L (ref 98–107)
CREAT SERPL-MCNC: 1.3 MG/DL (ref 0.51–0.95)
EGFRCR SERPLBLD CKD-EPI 2021: 45 ML/MIN/1.73M2
ERYTHROCYTE [DISTWIDTH] IN BLOOD BY AUTOMATED COUNT: 16.2 % (ref 10–15)
GLUCOSE SERPL-MCNC: 103 MG/DL (ref 70–99)
HCO3 SERPL-SCNC: 18 MMOL/L (ref 22–29)
HCT VFR BLD AUTO: 29.3 % (ref 35–47)
HGB BLD-MCNC: 8.7 G/DL (ref 11.7–15.7)
MCH RBC QN AUTO: 27.1 PG (ref 26.5–33)
MCHC RBC AUTO-ENTMCNC: 29.7 G/DL (ref 31.5–36.5)
MCV RBC AUTO: 91.3 FL (ref 78–100)
PLATELET # BLD AUTO: 273 10E3/UL (ref 150–450)
POTASSIUM SERPL-SCNC: 4.5 MMOL/L (ref 3.4–5.3)
RBC # BLD AUTO: 3.21 10E6/UL (ref 3.8–5.2)
SODIUM SERPL-SCNC: 142 MMOL/L (ref 135–145)
URATE SERPL-MCNC: 10.2 MG/DL (ref 2.4–5.7)
WBC # BLD AUTO: 10.64 10E3/UL (ref 4–11)

## 2025-08-15 PROCEDURE — 84550 ASSAY OF BLOOD/URIC ACID: CPT | Performed by: FAMILY MEDICINE

## 2025-08-15 PROCEDURE — 80048 BASIC METABOLIC PNL TOTAL CA: CPT | Performed by: FAMILY MEDICINE

## 2025-08-15 PROCEDURE — 36415 COLL VENOUS BLD VENIPUNCTURE: CPT | Performed by: FAMILY MEDICINE

## 2025-08-15 PROCEDURE — 85027 COMPLETE CBC AUTOMATED: CPT | Performed by: FAMILY MEDICINE

## 2025-08-15 RX ORDER — PANTOPRAZOLE SODIUM 40 MG/1
40 TABLET, DELAYED RELEASE ORAL DAILY
Qty: 30 TABLET | Refills: 1 | Status: SHIPPED | OUTPATIENT
Start: 2025-08-15

## 2025-08-18 ENCOUNTER — RESULTS FOLLOW-UP (OUTPATIENT)
Dept: FAMILY MEDICINE | Facility: CLINIC | Age: 70
End: 2025-08-18
Payer: COMMERCIAL

## 2025-08-18 RX ORDER — ALLOPURINOL 100 MG/1
TABLET ORAL
Qty: 90 TABLET | Refills: 1 | Status: SHIPPED | OUTPATIENT
Start: 2025-08-18 | End: 2025-11-16

## (undated) DEVICE — ELECTRODE DEFIB CADENCE 22550R

## (undated) DEVICE — MANIFOLD KIT ANGIO AUTOMATED 014613

## (undated) DEVICE — SOLUTION WATER 1000ML BOTTLE R5000-01

## (undated) DEVICE — SHTH INTRO 0.021IN ID 6FR DIA

## (undated) DEVICE — CATH DIAGNOSTIC RADIAL 5FR TIG 4.0

## (undated) DEVICE — KIT HAND CONTROL ACIST 014644 AR-P54

## (undated) DEVICE — CUSTOM PACK CORONARY SAN5BCRHEA

## (undated) DEVICE — HEMOSTAT COMPRESSION VASC REGULAR OD24 CM 3524

## (undated) DEVICE — SUCTION MANIFOLD NEPTUNE 2 SYS 1 PORT 702-025-000

## (undated) DEVICE — SYR ANGIOGRAPHY MULTIUSE KIT ACIST 014612

## (undated) DEVICE — TUBING SUCTION MEDI-VAC 1/4"X20' N620A

## (undated) RX ORDER — FENTANYL CITRATE 50 UG/ML
INJECTION, SOLUTION INTRAMUSCULAR; INTRAVENOUS
Status: DISPENSED
Start: 2025-07-10

## (undated) RX ORDER — HEPARIN SODIUM 1000 [USP'U]/ML
INJECTION, SOLUTION INTRAVENOUS; SUBCUTANEOUS
Status: DISPENSED
Start: 2023-09-15

## (undated) RX ORDER — LIDOCAINE HYDROCHLORIDE 10 MG/ML
INJECTION, SOLUTION INFILTRATION; PERINEURAL
Status: DISPENSED
Start: 2025-07-10

## (undated) RX ORDER — DIAZEPAM 5 MG
TABLET ORAL
Status: DISPENSED
Start: 2023-09-15